# Patient Record
Sex: MALE | Race: WHITE | NOT HISPANIC OR LATINO
[De-identification: names, ages, dates, MRNs, and addresses within clinical notes are randomized per-mention and may not be internally consistent; named-entity substitution may affect disease eponyms.]

---

## 2017-02-28 ENCOUNTER — APPOINTMENT (OUTPATIENT)
Dept: OTOLARYNGOLOGY | Facility: CLINIC | Age: 82
End: 2017-02-28

## 2017-08-02 ENCOUNTER — APPOINTMENT (OUTPATIENT)
Dept: OTOLARYNGOLOGY | Facility: CLINIC | Age: 82
End: 2017-08-02
Payer: MEDICARE

## 2017-08-02 VITALS
HEART RATE: 66 BPM | DIASTOLIC BLOOD PRESSURE: 62 MMHG | SYSTOLIC BLOOD PRESSURE: 130 MMHG | HEIGHT: 62 IN | WEIGHT: 130 LBS | BODY MASS INDEX: 23.92 KG/M2

## 2017-08-02 PROCEDURE — 99214 OFFICE O/P EST MOD 30 MIN: CPT | Mod: 25

## 2017-08-02 PROCEDURE — 69210 REMOVE IMPACTED EAR WAX UNI: CPT

## 2017-08-02 PROCEDURE — 92567 TYMPANOMETRY: CPT

## 2017-08-02 PROCEDURE — 92557 COMPREHENSIVE HEARING TEST: CPT

## 2017-08-02 RX ORDER — METOPROLOL SUCCINATE 25 MG/1
25 TABLET, EXTENDED RELEASE ORAL
Qty: 180 | Refills: 0 | Status: ACTIVE | COMMUNITY
Start: 2016-12-20

## 2017-08-02 RX ORDER — TRIAMCINOLONE ACETONIDE 1 MG/G
0.1 CREAM TOPICAL
Qty: 80 | Refills: 0 | Status: ACTIVE | COMMUNITY
Start: 2017-03-10

## 2017-08-02 RX ORDER — BUDESONIDE AND FORMOTEROL FUMARATE DIHYDRATE 160; 4.5 UG/1; UG/1
160-4.5 AEROSOL RESPIRATORY (INHALATION)
Qty: 102 | Refills: 0 | Status: ACTIVE | COMMUNITY
Start: 2017-06-13

## 2017-08-02 RX ORDER — TRIAMTERENE AND HYDROCHLOROTHIAZIDE 25; 37.5 MG/1; MG/1
37.5-25 TABLET ORAL
Qty: 30 | Refills: 0 | Status: ACTIVE | COMMUNITY
Start: 2017-03-03

## 2017-11-06 ENCOUNTER — APPOINTMENT (OUTPATIENT)
Dept: OTOLARYNGOLOGY | Facility: CLINIC | Age: 82
End: 2017-11-06
Payer: MEDICARE

## 2017-11-06 VITALS
WEIGHT: 130 LBS | HEART RATE: 67 BPM | SYSTOLIC BLOOD PRESSURE: 144 MMHG | BODY MASS INDEX: 23.92 KG/M2 | DIASTOLIC BLOOD PRESSURE: 72 MMHG | HEIGHT: 62 IN

## 2017-11-06 DIAGNOSIS — J44.9 CHRONIC OBSTRUCTIVE PULMONARY DISEASE, UNSPECIFIED: ICD-10-CM

## 2017-11-06 PROCEDURE — 99213 OFFICE O/P EST LOW 20 MIN: CPT | Mod: 25

## 2017-11-06 PROCEDURE — 69210 REMOVE IMPACTED EAR WAX UNI: CPT

## 2017-11-06 PROCEDURE — 92557 COMPREHENSIVE HEARING TEST: CPT

## 2017-11-06 PROCEDURE — 92567 TYMPANOMETRY: CPT

## 2018-03-26 ENCOUNTER — APPOINTMENT (OUTPATIENT)
Dept: OTOLARYNGOLOGY | Facility: CLINIC | Age: 83
End: 2018-03-26

## 2019-04-30 ENCOUNTER — APPOINTMENT (OUTPATIENT)
Dept: OTOLARYNGOLOGY | Facility: CLINIC | Age: 84
End: 2019-04-30

## 2019-05-01 ENCOUNTER — APPOINTMENT (OUTPATIENT)
Dept: OTOLARYNGOLOGY | Facility: CLINIC | Age: 84
End: 2019-05-01
Payer: MEDICARE

## 2019-05-01 VITALS
BODY MASS INDEX: 23.92 KG/M2 | DIASTOLIC BLOOD PRESSURE: 58 MMHG | HEIGHT: 62 IN | WEIGHT: 130 LBS | HEART RATE: 56 BPM | SYSTOLIC BLOOD PRESSURE: 131 MMHG

## 2019-05-01 PROCEDURE — 92567 TYMPANOMETRY: CPT

## 2019-05-01 PROCEDURE — 92557 COMPREHENSIVE HEARING TEST: CPT

## 2019-05-01 PROCEDURE — 69210 REMOVE IMPACTED EAR WAX UNI: CPT

## 2019-05-01 PROCEDURE — 99214 OFFICE O/P EST MOD 30 MIN: CPT | Mod: 25

## 2019-05-01 NOTE — ASSESSMENT
[FreeTextEntry1] : Mr. BARR is a 89 year male with sensorineural hearing loss and cerumen impaction \par - debrox prn \par - referred for hearing aid evaluation, interested in new hearing aids possibly \par - also discussed possible addition of FM transmitter when watching TV as he seems to need it much louder than everyone else is comfortable\par - f/up 6 months for cerumen check

## 2019-05-01 NOTE — PROCEDURE
[FreeTextEntry1] : cerumen removal  [FreeTextEntry3] : After informed verbal consent is obtained, cerumen is removed from the b/l ear canal with a curette & suction. Pt tolerated well, no residual ear canal irritation. \par   [FreeTextEntry2] : cerumen impaction

## 2019-05-01 NOTE — HISTORY OF PRESENT ILLNESS
[de-identified] : Mr. BARR is a 89 year male with snhl, wears hearing aids. \par + left ear clogged for 2-3 days, tried peroxide with no improvement. \par + hearing loss\par denies otalgia, otorrhea, tinnitus, vertigo

## 2019-05-01 NOTE — PHYSICAL EXAM
[de-identified] : luciano NO [Midline] : trachea located in midline position [Normal] : no rashes

## 2019-12-09 ENCOUNTER — APPOINTMENT (OUTPATIENT)
Dept: OTOLARYNGOLOGY | Facility: CLINIC | Age: 84
End: 2019-12-09
Payer: MEDICARE

## 2019-12-09 VITALS
DIASTOLIC BLOOD PRESSURE: 66 MMHG | WEIGHT: 140 LBS | HEIGHT: 62 IN | BODY MASS INDEX: 25.76 KG/M2 | HEART RATE: 60 BPM | SYSTOLIC BLOOD PRESSURE: 135 MMHG

## 2019-12-09 PROCEDURE — 69210 REMOVE IMPACTED EAR WAX UNI: CPT

## 2019-12-09 PROCEDURE — 31231 NASAL ENDOSCOPY DX: CPT

## 2019-12-09 PROCEDURE — 99214 OFFICE O/P EST MOD 30 MIN: CPT | Mod: 25

## 2019-12-09 NOTE — ASSESSMENT
[FreeTextEntry1] : wax-\par After informed verbal consent is obtained, cerumen is removed from the right and left  ear canal with a curette and suction.\par Rx: \par Debrox was prescribed and  is to be placed in both ears on a routine basis to keep them free of wax.\par Routine debridement suggested to keep the ears free of wax.\par \par bilateral sensorineural hearing loss-cleared for hearing aids\par \par DNS and Rhinitis\par Rx\par   Steam humidification and hypertonic saline nasal irrigations \par \par mild-GERD- Antireflux recommendations were given to the patient\par \par \par \par \par

## 2019-12-09 NOTE — PHYSICAL EXAM
[de-identified] : bilateral cerumen [Nasal Endoscopy Performed] : nasal endoscopy was performed, see procedure section for findings [Laryngoscopy Performed] : laryngoscopy was performed, see procedure section for findings [Midline] : trachea located in midline position [Normal] : no rashes

## 2019-12-09 NOTE — HISTORY OF PRESENT ILLNESS
[No] : patient does not have a  history of radiation therapy [Hearing Loss] : hearing loss [de-identified] : 89 yo male\par bilateral chronic severe hearing loss with poor discrimination. \par He has a significant history of cerumen impaction\par Chr sl nasal congestiopn and hoarseness and wish scoping evaluation\par No SOB or stridor\par  [Presbycusis] : presbycusis [None] : No associated symptoms are reported.

## 2020-05-11 ENCOUNTER — APPOINTMENT (OUTPATIENT)
Dept: OTOLARYNGOLOGY | Facility: CLINIC | Age: 85
End: 2020-05-11
Payer: MEDICARE

## 2020-05-11 VITALS
HEART RATE: 60 BPM | WEIGHT: 130 LBS | HEIGHT: 62 IN | SYSTOLIC BLOOD PRESSURE: 148 MMHG | TEMPERATURE: 96.6 F | DIASTOLIC BLOOD PRESSURE: 66 MMHG | BODY MASS INDEX: 23.92 KG/M2

## 2020-05-11 DIAGNOSIS — R49.0 DYSPHONIA: ICD-10-CM

## 2020-05-11 PROCEDURE — 99213 OFFICE O/P EST LOW 20 MIN: CPT | Mod: 25

## 2020-05-11 PROCEDURE — 69210 REMOVE IMPACTED EAR WAX UNI: CPT

## 2020-05-11 NOTE — PHYSICAL EXAM
[Nasal Endoscopy Performed] : nasal endoscopy was performed, see procedure section for findings [Midline] : trachea located in midline position [Laryngoscopy Performed] : laryngoscopy was performed, see procedure section for findings [Normal] : orientation to person, place, and time: normal [de-identified] : bilateral cerumen

## 2020-05-11 NOTE — ASSESSMENT
[FreeTextEntry1] : wax-\par After informed verbal consent is obtained, cerumen is removed from the right and left  ear canal with a curette and suction.\par Rx: \par Debrox was prescribed and  is to be placed in both ears on a routine basis to keep them free of wax.\par Routine debridement suggested to keep the ears free of wax.\par \par bilateral sensorineural hearing loss-cleared for hearing aids\par \par \par f/u 4 months or prn \par \par

## 2020-05-11 NOTE — HISTORY OF PRESENT ILLNESS
[No] : patient does not have a  history of radiation therapy [Hearing Loss] : hearing loss [Presbycusis] : presbycusis [None] : No risk factors have been identified. [de-identified] : 89 yo male\par bilateral chronic severe hearing loss with poor discrimination. \par He has a significant history of cerumen impaction\par Chr sl nasal congestiopn and hoarseness and wish scoping evaluation\par No SOB or stridor\par  [Dizziness] : no dizziness [Anxiety] : no anxiety [Headache] : no headache [Recurrent Otitis Media] : no recurrent otitis media [Otitis Media with Effusion] : no otitis media with effusion [Meniere Disease] : no Meniere disease [Congenital Ear Malformation] : no congenital ear malformation [Perilymphatic Fistula] : no perilymphatic fistula [Otosclerosis] : no otosclerosis [Hypertension] : no hypertension [Loud Noise Exposure] : no history of loud noise exposure [Smoking] : no smoking [Stroke] : no stroke [Early Onset Hearing Loss] : no early onset hearing loss [Facial Pain] : no facial pain [Facial Pressure] : no facial pressure [Nasal Congestion] : no nasal congestion [Ear Fullness] : no ear fullness [Diplopia] : no diplopia [Allergic Rhinitis] : no allergic rhinitis [Maxillary Tooth Infection] : no maxillary tooth infection [Septal Deviation] : no septal deviation [Seasonal Allergies] : no seasonal allergies [Environmental Allergens] : no environmental allergens [Environmental Allergies] : no environmental allergies [Adenoidectomy] : no adenoidectomy [Nasal FB Removal] : no nasal foreign body removal [Allergies] : no allergies [Asthma] : no asthma [Neck Mass] : no neck mass [Neck Pain] : no neck pain [Chills] : no chills [Cough] : no cough [Cold Intolerance] : no cold intolerance [Fatigue] : no fatigue [Heat Intolerance] : no heat intolerance [Sialadenitis] : no sialadenitis [Hyperthyroidism] : no hyperthyroidism [Hodgkin Disease] : no hodgkin disease [Non-Hodgkin Lymphoma] : no non-hodgkin lymphoma [Tobacco Use] : no tobacco use [Alcohol Use] : no alcohol use [Graves Disease] : no graves disease [Thyroid Cancer] : no thyroid cancer

## 2020-09-09 ENCOUNTER — APPOINTMENT (OUTPATIENT)
Dept: OTOLARYNGOLOGY | Facility: CLINIC | Age: 85
End: 2020-09-09

## 2020-10-06 ENCOUNTER — APPOINTMENT (OUTPATIENT)
Dept: OTOLARYNGOLOGY | Facility: CLINIC | Age: 85
End: 2020-10-06
Payer: MEDICARE

## 2020-10-06 VITALS
HEART RATE: 55 BPM | TEMPERATURE: 97.6 F | BODY MASS INDEX: 23.92 KG/M2 | HEIGHT: 62 IN | SYSTOLIC BLOOD PRESSURE: 114 MMHG | DIASTOLIC BLOOD PRESSURE: 49 MMHG | WEIGHT: 130 LBS

## 2020-10-06 PROCEDURE — 69210 REMOVE IMPACTED EAR WAX UNI: CPT

## 2020-10-06 PROCEDURE — 99214 OFFICE O/P EST MOD 30 MIN: CPT | Mod: 25

## 2020-10-06 RX ORDER — VALSARTAN 80 MG/1
80 TABLET, COATED ORAL
Qty: 90 | Refills: 0 | Status: ACTIVE | COMMUNITY
Start: 2020-09-02

## 2020-10-06 RX ORDER — OLOPATADINE HYDROCHLORIDE 2 MG/ML
0.2 SOLUTION OPHTHALMIC
Qty: 2 | Refills: 0 | Status: ACTIVE | COMMUNITY
Start: 2020-06-18

## 2020-10-06 RX ORDER — FUROSEMIDE 20 MG/1
20 TABLET ORAL
Qty: 180 | Refills: 0 | Status: ACTIVE | COMMUNITY
Start: 2020-06-18

## 2020-10-06 NOTE — HISTORY OF PRESENT ILLNESS
[de-identified] : PAtient continues to wear his hearing aids bilaterally but has been having ear clogging bilaterally that is making his hearing seem worse. He does have minor pressure in the ears as they feel full but he believes this is due to wax. He does not have any issues with ringing in the ears or recent dizziness. He is not having any nasal complaints today such as congestion or runny nose . He does admit that a few days ago he put peroxide in the ears to try and clean them at home

## 2020-10-06 NOTE — ASSESSMENT
[FreeTextEntry1] : Cerumen impaction bilaterally curetted out will follow-up and see us in 6 months.

## 2021-03-15 ENCOUNTER — APPOINTMENT (OUTPATIENT)
Dept: OTOLARYNGOLOGY | Facility: CLINIC | Age: 86
End: 2021-03-15
Payer: MEDICARE

## 2021-03-15 VITALS
DIASTOLIC BLOOD PRESSURE: 62 MMHG | TEMPERATURE: 97.6 F | SYSTOLIC BLOOD PRESSURE: 139 MMHG | HEART RATE: 61 BPM | HEIGHT: 61 IN | WEIGHT: 140 LBS | BODY MASS INDEX: 26.43 KG/M2

## 2021-03-15 PROCEDURE — 99214 OFFICE O/P EST MOD 30 MIN: CPT | Mod: 25

## 2021-03-15 PROCEDURE — 69210 REMOVE IMPACTED EAR WAX UNI: CPT

## 2021-03-15 NOTE — HISTORY OF PRESENT ILLNESS
[de-identified] : Patient has hearing aids here for evaluation to have his ears cleaned because of diminished hearing.  Also interested in possible new hearing aids.

## 2021-07-19 ENCOUNTER — APPOINTMENT (OUTPATIENT)
Dept: OTOLARYNGOLOGY | Facility: CLINIC | Age: 86
End: 2021-07-19
Payer: MEDICARE

## 2021-07-19 DIAGNOSIS — J34.2 DEVIATED NASAL SEPTUM: ICD-10-CM

## 2021-07-19 DIAGNOSIS — J31.0 CHRONIC RHINITIS: ICD-10-CM

## 2021-07-19 PROCEDURE — 99212 OFFICE O/P EST SF 10 MIN: CPT | Mod: 25

## 2021-07-19 PROCEDURE — 69210 REMOVE IMPACTED EAR WAX UNI: CPT

## 2021-07-20 NOTE — ASSESSMENT
New Reveal LINQ [id number] SN [id number] implanted  [FreeTextEntry1] : Patient with hx of hearing loss with hearing aids presents for routine ear cleaning. \par \par Wax:\par -Cerumen is removed from the right and left  ear canal with a curette and suction.\par -Rx:Debrox be placed in both ears on a routine basis to keep them free of wax.\par -Routine debridement suggested to keep the ears free of wax.\par \par Bilateral SNHL:\par -cleared for hearing aids\par \par f/u prn

## 2021-07-20 NOTE — HISTORY OF PRESENT ILLNESS
[de-identified] : Patient with hx of hearing loss with hearing aids presents for ear cleaning, states ear feel clogged here for routine ear cleaning. Otherwise doing well. Pt has no ear pain, ear drainage, tinnitus, vertigo, nasal congestion, nasal discharge, epistaxis, sinus infections, facial pain, facial pressure, throat pain, dysphagia or fevers\par \par

## 2021-08-06 ENCOUNTER — APPOINTMENT (OUTPATIENT)
Dept: OTOLARYNGOLOGY | Facility: CLINIC | Age: 86
End: 2021-08-06

## 2021-11-19 ENCOUNTER — APPOINTMENT (OUTPATIENT)
Dept: OTOLARYNGOLOGY | Facility: CLINIC | Age: 86
End: 2021-11-19
Payer: MEDICARE

## 2021-11-19 VITALS
HEIGHT: 61 IN | TEMPERATURE: 97.6 F | BODY MASS INDEX: 26.43 KG/M2 | HEART RATE: 67 BPM | SYSTOLIC BLOOD PRESSURE: 128 MMHG | WEIGHT: 140 LBS | DIASTOLIC BLOOD PRESSURE: 53 MMHG

## 2021-11-19 DIAGNOSIS — K21.9 GASTRO-ESOPHAGEAL REFLUX DISEASE W/OUT ESOPHAGITIS: ICD-10-CM

## 2021-11-19 PROCEDURE — 69210 REMOVE IMPACTED EAR WAX UNI: CPT

## 2021-11-19 PROCEDURE — 99212 OFFICE O/P EST SF 10 MIN: CPT | Mod: 25

## 2021-11-19 NOTE — HISTORY OF PRESENT ILLNESS
[de-identified] : Patient with hx of hearing loss with hearing aids presents for ear cleaning, states ear feel clogged here for routine ear cleaning. Otherwise doing well. Pt has no ear pain, ear drainage, tinnitus, vertigo, nasal congestion, nasal discharge, epistaxis, sinus infections, facial pain, facial pressure, throat pain, dysphagia or fevers\par \par

## 2021-11-19 NOTE — ASSESSMENT
[FreeTextEntry1] : Patient with hx of hearing loss with hearing aids presents for routine ear cleaning. \par \par Wax:\par -Cerumen is removed from the right and left  ear canal with a curette and suction.\par -Rx:Debrox be placed in both ears on a routine basis to keep them free of wax.\par -Routine debridement suggested to keep the ears free of wax.\par \par Bilateral SNHL:\par -cleared for hearing aids\par \par f/u prn

## 2022-03-31 ENCOUNTER — APPOINTMENT (OUTPATIENT)
Dept: OTOLARYNGOLOGY | Facility: CLINIC | Age: 87
End: 2022-03-31
Payer: MEDICARE

## 2022-03-31 VITALS — HEART RATE: 57 BPM | TEMPERATURE: 97.4 F | SYSTOLIC BLOOD PRESSURE: 104 MMHG | DIASTOLIC BLOOD PRESSURE: 55 MMHG

## 2022-03-31 PROCEDURE — 99214 OFFICE O/P EST MOD 30 MIN: CPT | Mod: 25

## 2022-03-31 PROCEDURE — 69210 REMOVE IMPACTED EAR WAX UNI: CPT

## 2022-03-31 NOTE — HISTORY OF PRESENT ILLNESS
[de-identified] : Patient wears hearing aids and feels the need for an ear cleaning. he does not have any pain in the ears or issues with ringing in the ears or dizziness. He is due to have his ears cleaned but wanted to come a little earlier as he has a wedding this weekend he will be attending

## 2022-03-31 NOTE — END OF VISIT
[FreeTextEntry3] : I saw and examined this patient in person. I have discussed with Tawanna Medel, Physician Assistant, in detail the above note and agree with the above assessment and plan of care.\par

## 2022-10-12 ENCOUNTER — APPOINTMENT (OUTPATIENT)
Dept: OTOLARYNGOLOGY | Facility: CLINIC | Age: 87
End: 2022-10-12

## 2022-10-12 VITALS
SYSTOLIC BLOOD PRESSURE: 150 MMHG | HEART RATE: 55 BPM | HEIGHT: 61 IN | DIASTOLIC BLOOD PRESSURE: 61 MMHG | BODY MASS INDEX: 27.19 KG/M2 | WEIGHT: 144 LBS

## 2022-10-12 PROCEDURE — 99214 OFFICE O/P EST MOD 30 MIN: CPT | Mod: 25

## 2022-10-12 PROCEDURE — 69210 REMOVE IMPACTED EAR WAX UNI: CPT

## 2022-10-12 PROCEDURE — 92557 COMPREHENSIVE HEARING TEST: CPT

## 2022-10-12 PROCEDURE — 92567 TYMPANOMETRY: CPT

## 2022-10-12 NOTE — ASSESSMENT
[FreeTextEntry1] : Patient 89-year-old gentleman who comes in for cerumen management curetted out felt that his hearing deteriorated on examination tympanic membrane's intact we did an audiogram pretty stable.  We will follow-up with us as needed.

## 2022-10-12 NOTE — HISTORY OF PRESENT ILLNESS
[de-identified] : PAtient is here for an ear cleaning. He does not have any pain in the ears but yesterday the hearing suddenly worsened. He does not have any drainage from the ears or ringing in the ears. He does wear hearing aids but not using them daily\par

## 2022-10-12 NOTE — END OF VISIT
[FreeTextEntry3] : I, Dr. Pugh personally performed the evaluation and management (E/M) services , including all procedures, for this established patient who presents today with (a) new problem(s)/exacerbation of (an) existing condition(s). That E/M includes conducting the clinically appropriate interval history &/or exam, assessing all new/exacerbated conditions, and establishing a new plan of care. Today, my GISELLE, Tawanna Medel, was here to observe &/or participate in the visit & follow plan of care established by me.\par \par \par \par

## 2023-05-04 ENCOUNTER — APPOINTMENT (OUTPATIENT)
Dept: OTOLARYNGOLOGY | Facility: CLINIC | Age: 88
End: 2023-05-04
Payer: MEDICARE

## 2023-05-04 VITALS
DIASTOLIC BLOOD PRESSURE: 61 MMHG | SYSTOLIC BLOOD PRESSURE: 136 MMHG | TEMPERATURE: 98 F | BODY MASS INDEX: 27 KG/M2 | HEIGHT: 61 IN | HEART RATE: 57 BPM | WEIGHT: 143 LBS

## 2023-05-04 PROCEDURE — 69210 REMOVE IMPACTED EAR WAX UNI: CPT

## 2023-05-04 PROCEDURE — 99214 OFFICE O/P EST MOD 30 MIN: CPT | Mod: 25

## 2023-05-04 NOTE — HISTORY OF PRESENT ILLNESS
[de-identified] : Patient does wear hearing aids but not wearing right now, here for an ear cleaning. He does not have any complaints about pain in the ears or pressure. He does not have any recent issues with nasal congestion or runnynose

## 2023-07-01 ENCOUNTER — OUTPATIENT (OUTPATIENT)
Dept: OUTPATIENT SERVICES | Facility: HOSPITAL | Age: 88
LOS: 1 days | End: 2023-07-01
Payer: MEDICARE

## 2023-07-01 ENCOUNTER — APPOINTMENT (OUTPATIENT)
Dept: RADIOLOGY | Facility: CLINIC | Age: 88
End: 2023-07-01
Payer: MEDICARE

## 2023-07-01 DIAGNOSIS — Z98.0 INTESTINAL BYPASS AND ANASTOMOSIS STATUS: Chronic | ICD-10-CM

## 2023-07-01 DIAGNOSIS — D46.1 REFRACTORY ANEMIA WITH RING SIDEROBLASTS: ICD-10-CM

## 2023-07-01 PROCEDURE — 71046 X-RAY EXAM CHEST 2 VIEWS: CPT

## 2023-07-01 PROCEDURE — 71046 X-RAY EXAM CHEST 2 VIEWS: CPT | Mod: 26

## 2023-09-07 NOTE — ASSESSMENT
[FreeTextEntry1] : Patient follows up once again diminished hearing once again cerumen impaction once again curetted out revealing normal tympanic membranes otherwise patient is doing really well will follow up and see us in 6 months.
No.

## 2024-01-23 ENCOUNTER — APPOINTMENT (OUTPATIENT)
Dept: OTOLARYNGOLOGY | Facility: CLINIC | Age: 89
End: 2024-01-23
Payer: MEDICARE

## 2024-01-23 VITALS
HEART RATE: 62 BPM | TEMPERATURE: 98 F | BODY MASS INDEX: 27 KG/M2 | DIASTOLIC BLOOD PRESSURE: 58 MMHG | WEIGHT: 143 LBS | HEIGHT: 61 IN | SYSTOLIC BLOOD PRESSURE: 165 MMHG

## 2024-01-23 DIAGNOSIS — R49.0 DYSPHONIA: ICD-10-CM

## 2024-01-23 DIAGNOSIS — H61.23 IMPACTED CERUMEN, BILATERAL: ICD-10-CM

## 2024-01-23 DIAGNOSIS — R42 DIZZINESS AND GIDDINESS: ICD-10-CM

## 2024-01-23 PROCEDURE — 99213 OFFICE O/P EST LOW 20 MIN: CPT | Mod: 25

## 2024-01-23 PROCEDURE — 69210 REMOVE IMPACTED EAR WAX UNI: CPT

## 2024-01-23 RX ORDER — MOMETASONE FUROATE 1 MG/ML
0.1 SOLUTION TOPICAL
Qty: 1 | Refills: 1 | Status: ACTIVE | COMMUNITY
Start: 2024-01-23 | End: 1900-01-01

## 2024-01-23 NOTE — PHYSICAL EXAM
[Midline] : trachea located in midline position [Normal] : no rashes [de-identified] : cerumen in the ear canals; once removed normal EACs

## 2024-01-23 NOTE — END OF VISIT
[FreeTextEntry3] : I, Dr. Pugh personally performed the evaluation and management (E/M) services , including all procedures, for this established patient who presents today with (a) new problem(s)/exacerbation of (an) existing condition(s). That E/M includes conducting the clinically appropriate interval history &/or exam, assessing all new/exacerbated conditions, and establishing a new plan of care. Today, my GISELLE, Tawanna Medel, was here to observe &/or participate in the visit & follow plan of care established by me.

## 2024-01-23 NOTE — ASSESSMENT
[FreeTextEntry1] : Patient here with his daughter cerumen impaction complaining of some dry postnasal drip told him to use some saline otherwise doing really well follow-up and see us in 3 months.

## 2024-01-23 NOTE — HISTORY OF PRESENT ILLNESS
[de-identified] : Patient comes in for an ear check and possible cleaning. he does not have any pain in the ears or ringing in the ears . Historically he does build up cerumen and needs an ear cleaning. He does have dizziness with certain head movements He does complain that he goes to bed at night with bilateral ear clogging. He is able to remove some wax with his fingers when this happens  Additionally he feels his voice is hoarse on occasion as well as feels a lot of mucus in the throat

## 2024-01-23 NOTE — REVIEW OF SYSTEMS
[Negative] : Heme/Lymph [Dizziness] : dizziness [As Noted in HPI] : as noted in HPI [Hoarseness] : hoarseness

## 2024-04-22 ENCOUNTER — APPOINTMENT (OUTPATIENT)
Dept: OTOLARYNGOLOGY | Facility: CLINIC | Age: 89
End: 2024-04-22

## 2024-04-23 ENCOUNTER — APPOINTMENT (OUTPATIENT)
Dept: OTOLARYNGOLOGY | Facility: CLINIC | Age: 89
End: 2024-04-23
Payer: MEDICARE

## 2024-04-23 VITALS — BODY MASS INDEX: 27 KG/M2 | WEIGHT: 143 LBS | HEIGHT: 61 IN

## 2024-04-23 DIAGNOSIS — H90.3 SENSORINEURAL HEARING LOSS, BILATERAL: ICD-10-CM

## 2024-04-23 PROCEDURE — 99213 OFFICE O/P EST LOW 20 MIN: CPT | Mod: 25

## 2024-04-23 PROCEDURE — 69210 REMOVE IMPACTED EAR WAX UNI: CPT

## 2024-04-23 NOTE — ASSESSMENT
[FreeTextEntry1] : `` Patient cerumen management wax removed bilaterally otherwise he is doing really well follow-up and see us in 3 months.

## 2024-04-23 NOTE — PHYSICAL EXAM
[Midline] : trachea located in midline position [Normal] : no rashes [de-identified] : cerumen in the ear canals; once removed normal EACs

## 2024-04-23 NOTE — HISTORY OF PRESENT ILLNESS
[de-identified] : Patient comes in with ear clogging and needs another ear cleaning. He does not have any complaints today like pain in the ears or issues with pressure. He does have hearing aids but doesnt wear them He does not have any nasal complaints today like congestion or runny nose  He can tell when he is due for an ear cleaning because he gets off balance when he is clogged

## 2024-06-12 ENCOUNTER — INPATIENT (INPATIENT)
Facility: HOSPITAL | Age: 89
LOS: 4 days | Discharge: HOME HEALTH | DRG: 206 | End: 2024-06-17
Attending: INTERNAL MEDICINE | Admitting: INTERNAL MEDICINE
Payer: MEDICARE

## 2024-06-12 VITALS
SYSTOLIC BLOOD PRESSURE: 162 MMHG | WEIGHT: 130.07 LBS | RESPIRATION RATE: 20 BRPM | TEMPERATURE: 98 F | OXYGEN SATURATION: 99 % | HEART RATE: 60 BPM | HEIGHT: 62 IN | DIASTOLIC BLOOD PRESSURE: 56 MMHG

## 2024-06-12 DIAGNOSIS — S22.49XA MULTIPLE FRACTURES OF RIBS, UNSPECIFIED SIDE, INITIAL ENCOUNTER FOR CLOSED FRACTURE: ICD-10-CM

## 2024-06-12 DIAGNOSIS — Z98.0 INTESTINAL BYPASS AND ANASTOMOSIS STATUS: Chronic | ICD-10-CM

## 2024-06-12 LAB
ALBUMIN SERPL ELPH-MCNC: 3.8 G/DL — SIGNIFICANT CHANGE UP (ref 3.3–5)
ALP SERPL-CCNC: 103 U/L — SIGNIFICANT CHANGE UP (ref 40–120)
ALT FLD-CCNC: 12 U/L — SIGNIFICANT CHANGE UP (ref 10–45)
ANION GAP SERPL CALC-SCNC: 11 MMOL/L — SIGNIFICANT CHANGE UP (ref 5–17)
ANISOCYTOSIS BLD QL: SLIGHT — SIGNIFICANT CHANGE UP
APPEARANCE UR: CLEAR — SIGNIFICANT CHANGE UP
AST SERPL-CCNC: 30 U/L — SIGNIFICANT CHANGE UP (ref 10–40)
BASE EXCESS BLDV CALC-SCNC: -1.2 MMOL/L — SIGNIFICANT CHANGE UP (ref -2–3)
BASOPHILS # BLD AUTO: 0.1 K/UL — SIGNIFICANT CHANGE UP (ref 0–0.2)
BASOPHILS NFR BLD AUTO: 1.7 % — SIGNIFICANT CHANGE UP (ref 0–2)
BILIRUB SERPL-MCNC: 1.4 MG/DL — HIGH (ref 0.2–1.2)
BILIRUB UR-MCNC: NEGATIVE — SIGNIFICANT CHANGE UP
BLD GP AB SCN SERPL QL: NEGATIVE — SIGNIFICANT CHANGE UP
BUN SERPL-MCNC: 22 MG/DL — SIGNIFICANT CHANGE UP (ref 7–23)
CA-I SERPL-SCNC: 1.19 MMOL/L — SIGNIFICANT CHANGE UP (ref 1.15–1.33)
CALCIUM SERPL-MCNC: 9 MG/DL — SIGNIFICANT CHANGE UP (ref 8.4–10.5)
CHLORIDE BLDV-SCNC: 106 MMOL/L — SIGNIFICANT CHANGE UP (ref 96–108)
CHLORIDE SERPL-SCNC: 108 MMOL/L — SIGNIFICANT CHANGE UP (ref 96–108)
CO2 BLDV-SCNC: 27 MMOL/L — HIGH (ref 22–26)
CO2 SERPL-SCNC: 21 MMOL/L — LOW (ref 22–31)
COLOR SPEC: YELLOW — SIGNIFICANT CHANGE UP
CREAT SERPL-MCNC: 0.87 MG/DL — SIGNIFICANT CHANGE UP (ref 0.5–1.3)
DIFF PNL FLD: NEGATIVE — SIGNIFICANT CHANGE UP
EGFR: 78 ML/MIN/1.73M2 — SIGNIFICANT CHANGE UP
EOSINOPHIL # BLD AUTO: 0.27 K/UL — SIGNIFICANT CHANGE UP (ref 0–0.5)
EOSINOPHIL NFR BLD AUTO: 4.4 % — SIGNIFICANT CHANGE UP (ref 0–6)
ETHANOL SERPL-MCNC: <10 MG/DL — SIGNIFICANT CHANGE UP (ref 0–10)
GAS PNL BLDV: 138 MMOL/L — SIGNIFICANT CHANGE UP (ref 136–145)
GAS PNL BLDV: SIGNIFICANT CHANGE UP
GLUCOSE BLDV-MCNC: 83 MG/DL — SIGNIFICANT CHANGE UP (ref 70–99)
GLUCOSE SERPL-MCNC: 93 MG/DL — SIGNIFICANT CHANGE UP (ref 70–99)
GLUCOSE UR QL: NEGATIVE MG/DL — SIGNIFICANT CHANGE UP
HCO3 BLDV-SCNC: 26 MMOL/L — SIGNIFICANT CHANGE UP (ref 22–29)
HCT VFR BLD CALC: 35 % — LOW (ref 39–50)
HCT VFR BLDA CALC: 29 % — LOW (ref 39–51)
HGB BLD CALC-MCNC: 9.7 G/DL — LOW (ref 12.6–17.4)
HGB BLD-MCNC: 11.1 G/DL — LOW (ref 13–17)
KETONES UR-MCNC: NEGATIVE MG/DL — SIGNIFICANT CHANGE UP
LACTATE BLDV-MCNC: 1.7 MMOL/L — SIGNIFICANT CHANGE UP (ref 0.5–2)
LACTATE SERPL-SCNC: 1.9 MMOL/L — SIGNIFICANT CHANGE UP (ref 0.5–2)
LEUKOCYTE ESTERASE UR-ACNC: NEGATIVE — SIGNIFICANT CHANGE UP
LIDOCAIN IGE QN: 21 U/L — SIGNIFICANT CHANGE UP (ref 7–60)
LYMPHOCYTES # BLD AUTO: 2.12 K/UL — SIGNIFICANT CHANGE UP (ref 1–3.3)
LYMPHOCYTES # BLD AUTO: 35.1 % — SIGNIFICANT CHANGE UP (ref 13–44)
MACROCYTES BLD QL: SIGNIFICANT CHANGE UP
MANUAL SMEAR VERIFICATION: SIGNIFICANT CHANGE UP
MCHC RBC-ENTMCNC: 31.7 GM/DL — LOW (ref 32–36)
MCHC RBC-ENTMCNC: 35.4 PG — HIGH (ref 27–34)
MCV RBC AUTO: 111.5 FL — HIGH (ref 80–100)
MONOCYTES # BLD AUTO: 1.32 K/UL — HIGH (ref 0–0.9)
MONOCYTES NFR BLD AUTO: 21.9 % — HIGH (ref 2–14)
MYELOCYTES NFR BLD: 0.9 % — HIGH (ref 0–0)
NEUTROPHILS # BLD AUTO: 2.17 K/UL — SIGNIFICANT CHANGE UP (ref 1.8–7.4)
NEUTROPHILS NFR BLD AUTO: 34.2 % — LOW (ref 43–77)
NEUTS BAND # BLD: 1.8 % — SIGNIFICANT CHANGE UP (ref 0–8)
NITRITE UR-MCNC: NEGATIVE — SIGNIFICANT CHANGE UP
OVALOCYTES BLD QL SMEAR: SLIGHT — SIGNIFICANT CHANGE UP
PCO2 BLDV: 52 MMHG — SIGNIFICANT CHANGE UP (ref 42–55)
PH BLDV: 7.3 — LOW (ref 7.32–7.43)
PH UR: 5 — SIGNIFICANT CHANGE UP (ref 5–8)
PLAT MORPH BLD: ABNORMAL
PLATELET # BLD AUTO: 140 K/UL — LOW (ref 150–400)
PO2 BLDV: 34 MMHG — SIGNIFICANT CHANGE UP (ref 25–45)
POIKILOCYTOSIS BLD QL AUTO: SLIGHT — SIGNIFICANT CHANGE UP
POTASSIUM BLDV-SCNC: 4 MMOL/L — SIGNIFICANT CHANGE UP (ref 3.5–5.1)
POTASSIUM SERPL-MCNC: 4.1 MMOL/L — SIGNIFICANT CHANGE UP (ref 3.5–5.3)
POTASSIUM SERPL-SCNC: 4.1 MMOL/L — SIGNIFICANT CHANGE UP (ref 3.5–5.3)
PROT SERPL-MCNC: 6.6 G/DL — SIGNIFICANT CHANGE UP (ref 6–8.3)
PROT UR-MCNC: NEGATIVE MG/DL — SIGNIFICANT CHANGE UP
RBC # BLD: 3.14 M/UL — LOW (ref 4.2–5.8)
RBC # FLD: 20.7 % — HIGH (ref 10.3–14.5)
RBC BLD AUTO: ABNORMAL
RH IG SCN BLD-IMP: POSITIVE — SIGNIFICANT CHANGE UP
SAO2 % BLDV: 49.3 % — LOW (ref 67–88)
SCHISTOCYTES BLD QL AUTO: SLIGHT — SIGNIFICANT CHANGE UP
SODIUM SERPL-SCNC: 140 MMOL/L — SIGNIFICANT CHANGE UP (ref 135–145)
SP GR SPEC: >1.03 — HIGH (ref 1–1.03)
TROPONIN T, HIGH SENSITIVITY RESULT: 108 NG/L — HIGH (ref 0–51)
TROPONIN T, HIGH SENSITIVITY RESULT: 111 NG/L — HIGH (ref 0–51)
UROBILINOGEN FLD QL: 0.2 MG/DL — SIGNIFICANT CHANGE UP (ref 0.2–1)
WBC # BLD: 6.03 K/UL — SIGNIFICANT CHANGE UP (ref 3.8–10.5)
WBC # FLD AUTO: 6.03 K/UL — SIGNIFICANT CHANGE UP (ref 3.8–10.5)

## 2024-06-12 PROCEDURE — 71260 CT THORAX DX C+: CPT | Mod: 26,MC

## 2024-06-12 PROCEDURE — 70450 CT HEAD/BRAIN W/O DYE: CPT | Mod: 26,MC

## 2024-06-12 PROCEDURE — 99285 EMERGENCY DEPT VISIT HI MDM: CPT | Mod: FS

## 2024-06-12 PROCEDURE — 71045 X-RAY EXAM CHEST 1 VIEW: CPT | Mod: 26

## 2024-06-12 PROCEDURE — 99223 1ST HOSP IP/OBS HIGH 75: CPT

## 2024-06-12 PROCEDURE — 72125 CT NECK SPINE W/O DYE: CPT | Mod: 26,MC

## 2024-06-12 PROCEDURE — 72170 X-RAY EXAM OF PELVIS: CPT | Mod: 26

## 2024-06-12 PROCEDURE — 74177 CT ABD & PELVIS W/CONTRAST: CPT | Mod: 26,MC

## 2024-06-12 RX ORDER — CLOPIDOGREL BISULFATE 75 MG/1
75 TABLET, FILM COATED ORAL DAILY
Refills: 0 | Status: DISCONTINUED | OUTPATIENT
Start: 2024-06-13 | End: 2024-06-17

## 2024-06-12 RX ORDER — CYANOCOBALAMIN (VITAMIN B-12) 1000 MCG
1000 TABLET, EXTENDED RELEASE ORAL DAILY
Refills: 0 | Status: DISCONTINUED | OUTPATIENT
Start: 2024-06-12 | End: 2024-06-14

## 2024-06-12 RX ORDER — HEPARIN SODIUM 50 [USP'U]/ML
5000 INJECTION, SOLUTION INTRAVENOUS EVERY 12 HOURS
Refills: 0 | Status: DISCONTINUED | OUTPATIENT
Start: 2024-06-12 | End: 2024-06-17

## 2024-06-12 RX ORDER — TAMSULOSIN HYDROCHLORIDE 0.4 MG/1
0.4 CAPSULE ORAL AT BEDTIME
Refills: 0 | Status: DISCONTINUED | OUTPATIENT
Start: 2024-06-12 | End: 2024-06-17

## 2024-06-12 RX ORDER — METOPROLOL TARTRATE 50 MG
1 TABLET ORAL
Refills: 0 | DISCHARGE

## 2024-06-12 RX ORDER — ENOXAPARIN SODIUM 100 MG/ML
40 INJECTION SUBCUTANEOUS EVERY 24 HOURS
Refills: 0 | Status: DISCONTINUED | OUTPATIENT
Start: 2024-06-12 | End: 2024-06-12

## 2024-06-12 RX ORDER — ACETAMINOPHEN 325 MG
650 TABLET ORAL EVERY 6 HOURS
Refills: 0 | Status: DISCONTINUED | OUTPATIENT
Start: 2024-06-12 | End: 2024-06-17

## 2024-06-12 RX ORDER — METOPROLOL TARTRATE 50 MG
25 TABLET ORAL DAILY
Refills: 0 | Status: DISCONTINUED | OUTPATIENT
Start: 2024-06-13 | End: 2024-06-17

## 2024-06-12 RX ORDER — METOPROLOL TARTRATE 50 MG
25 TABLET ORAL ONCE
Refills: 0 | Status: COMPLETED | OUTPATIENT
Start: 2024-06-12 | End: 2024-06-12

## 2024-06-12 RX ORDER — ACETAMINOPHEN 325 MG
1000 TABLET ORAL ONCE
Refills: 0 | Status: COMPLETED | OUTPATIENT
Start: 2024-06-12 | End: 2024-06-12

## 2024-06-12 RX ADMIN — Medication 25 MILLIGRAM(S): at 21:01

## 2024-06-12 RX ADMIN — Medication 400 MILLIGRAM(S): at 18:00

## 2024-06-12 RX ADMIN — Medication 650 MILLIGRAM(S): at 18:34

## 2024-06-12 RX ADMIN — TAMSULOSIN HYDROCHLORIDE 0.4 MILLIGRAM(S): 0.4 CAPSULE ORAL at 21:02

## 2024-06-13 ENCOUNTER — RESULT REVIEW (OUTPATIENT)
Age: 89
End: 2024-06-13

## 2024-06-13 DIAGNOSIS — Z29.9 ENCOUNTER FOR PROPHYLACTIC MEASURES, UNSPECIFIED: ICD-10-CM

## 2024-06-13 DIAGNOSIS — R79.89 OTHER SPECIFIED ABNORMAL FINDINGS OF BLOOD CHEMISTRY: ICD-10-CM

## 2024-06-13 DIAGNOSIS — Z71.89 OTHER SPECIFIED COUNSELING: ICD-10-CM

## 2024-06-13 DIAGNOSIS — Z75.8 OTHER PROBLEMS RELATED TO MEDICAL FACILITIES AND OTHER HEALTH CARE: ICD-10-CM

## 2024-06-13 DIAGNOSIS — Z98.890 OTHER SPECIFIED POSTPROCEDURAL STATES: Chronic | ICD-10-CM

## 2024-06-13 DIAGNOSIS — I10 ESSENTIAL (PRIMARY) HYPERTENSION: ICD-10-CM

## 2024-06-13 DIAGNOSIS — Z87.438 PERSONAL HISTORY OF OTHER DISEASES OF MALE GENITAL ORGANS: ICD-10-CM

## 2024-06-13 DIAGNOSIS — D46.9 MYELODYSPLASTIC SYNDROME, UNSPECIFIED: ICD-10-CM

## 2024-06-13 DIAGNOSIS — R41.89 OTHER SYMPTOMS AND SIGNS INVOLVING COGNITIVE FUNCTIONS AND AWARENESS: ICD-10-CM

## 2024-06-13 DIAGNOSIS — R93.7 ABNORMAL FINDINGS ON DIAGNOSTIC IMAGING OF OTHER PARTS OF MUSCULOSKELETAL SYSTEM: ICD-10-CM
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
## 2024-06-13 LAB
ANION GAP SERPL CALC-SCNC: 12 MMOL/L — SIGNIFICANT CHANGE UP (ref 5–17)
BASOPHILS # BLD AUTO: 0.05 K/UL — SIGNIFICANT CHANGE UP (ref 0–0.2)
BASOPHILS NFR BLD AUTO: 0.8 % — SIGNIFICANT CHANGE UP (ref 0–2)
BUN SERPL-MCNC: 21 MG/DL — SIGNIFICANT CHANGE UP (ref 7–23)
CALCIUM SERPL-MCNC: 8.4 MG/DL — SIGNIFICANT CHANGE UP (ref 8.4–10.5)
CHLORIDE SERPL-SCNC: 106 MMOL/L — SIGNIFICANT CHANGE UP (ref 96–108)
CK MB CFR SERPL CALC: 6 NG/ML — SIGNIFICANT CHANGE UP (ref 0–6.7)
CK SERPL-CCNC: 63 U/L — SIGNIFICANT CHANGE UP (ref 30–200)
CO2 SERPL-SCNC: 22 MMOL/L — SIGNIFICANT CHANGE UP (ref 22–31)
CREAT SERPL-MCNC: 0.83 MG/DL — SIGNIFICANT CHANGE UP (ref 0.5–1.3)
EGFR: 79 ML/MIN/1.73M2 — SIGNIFICANT CHANGE UP
EOSINOPHIL # BLD AUTO: 0.09 K/UL — SIGNIFICANT CHANGE UP (ref 0–0.5)
EOSINOPHIL NFR BLD AUTO: 1.4 % — SIGNIFICANT CHANGE UP (ref 0–6)
GLUCOSE SERPL-MCNC: 80 MG/DL — SIGNIFICANT CHANGE UP (ref 70–99)
HCT VFR BLD CALC: 31.6 % — LOW (ref 39–50)
HGB BLD-MCNC: 10.2 G/DL — LOW (ref 13–17)
IMM GRANULOCYTES NFR BLD AUTO: 1 % — HIGH (ref 0–0.9)
LYMPHOCYTES # BLD AUTO: 2.07 K/UL — SIGNIFICANT CHANGE UP (ref 1–3.3)
LYMPHOCYTES # BLD AUTO: 32.8 % — SIGNIFICANT CHANGE UP (ref 13–44)
MCHC RBC-ENTMCNC: 32.3 GM/DL — SIGNIFICANT CHANGE UP (ref 32–36)
MCHC RBC-ENTMCNC: 35.4 PG — HIGH (ref 27–34)
MCV RBC AUTO: 109.7 FL — HIGH (ref 80–100)
MONOCYTES # BLD AUTO: 0.94 K/UL — HIGH (ref 0–0.9)
MONOCYTES NFR BLD AUTO: 14.9 % — HIGH (ref 2–14)
NEUTROPHILS # BLD AUTO: 3.1 K/UL — SIGNIFICANT CHANGE UP (ref 1.8–7.4)
NEUTROPHILS NFR BLD AUTO: 49.1 % — SIGNIFICANT CHANGE UP (ref 43–77)
NRBC # BLD: 0 /100 WBCS — SIGNIFICANT CHANGE UP (ref 0–0)
PLATELET # BLD AUTO: 133 K/UL — LOW (ref 150–400)
POTASSIUM SERPL-MCNC: 4 MMOL/L — SIGNIFICANT CHANGE UP (ref 3.5–5.3)
POTASSIUM SERPL-SCNC: 4 MMOL/L — SIGNIFICANT CHANGE UP (ref 3.5–5.3)
PROCALCITONIN SERPL-MCNC: 0.06 NG/ML — SIGNIFICANT CHANGE UP (ref 0.02–0.1)
RBC # BLD: 2.88 M/UL — LOW (ref 4.2–5.8)
RBC # FLD: 20.3 % — HIGH (ref 10.3–14.5)
SODIUM SERPL-SCNC: 140 MMOL/L — SIGNIFICANT CHANGE UP (ref 135–145)
TROPONIN T, HIGH SENSITIVITY RESULT: 112 NG/L — HIGH (ref 0–51)
WBC # BLD: 6.31 K/UL — SIGNIFICANT CHANGE UP (ref 3.8–10.5)
WBC # FLD AUTO: 6.31 K/UL — SIGNIFICANT CHANGE UP (ref 3.8–10.5)

## 2024-06-13 PROCEDURE — 93306 TTE W/DOPPLER COMPLETE: CPT | Mod: 26

## 2024-06-13 PROCEDURE — 99223 1ST HOSP IP/OBS HIGH 75: CPT

## 2024-06-13 PROCEDURE — 71045 X-RAY EXAM CHEST 1 VIEW: CPT | Mod: 26

## 2024-06-13 RX ORDER — THIAMINE HCL 100 MG
500 TABLET ORAL ONCE
Refills: 0 | Status: COMPLETED | OUTPATIENT
Start: 2024-06-13 | End: 2024-06-13

## 2024-06-13 RX ORDER — VALPROIC ACID 250 MG/5ML
125 SOLUTION ORAL ONCE
Refills: 0 | Status: COMPLETED | OUTPATIENT
Start: 2024-06-13 | End: 2024-06-13

## 2024-06-13 RX ADMIN — VALPROIC ACID 125 MILLIGRAM(S): 250 SOLUTION ORAL at 20:00

## 2024-06-13 RX ADMIN — Medication 5 MILLIGRAM(S): at 22:57

## 2024-06-13 RX ADMIN — Medication 650 MILLIGRAM(S): at 05:34

## 2024-06-13 RX ADMIN — CLOPIDOGREL BISULFATE 75 MILLIGRAM(S): 75 TABLET, FILM COATED ORAL at 12:25

## 2024-06-13 RX ADMIN — Medication 105 MILLIGRAM(S): at 20:00

## 2024-06-13 RX ADMIN — TAMSULOSIN HYDROCHLORIDE 0.4 MILLIGRAM(S): 0.4 CAPSULE ORAL at 21:52

## 2024-06-13 RX ADMIN — Medication 1000 MICROGRAM(S): at 12:25

## 2024-06-13 RX ADMIN — HEPARIN SODIUM 5000 UNIT(S): 50 INJECTION, SOLUTION INTRAVENOUS at 05:34

## 2024-06-13 RX ADMIN — Medication 1000 UNIT(S): at 12:24

## 2024-06-13 RX ADMIN — HEPARIN SODIUM 5000 UNIT(S): 50 INJECTION, SOLUTION INTRAVENOUS at 18:34

## 2024-06-13 RX ADMIN — Medication 25 MILLIGRAM(S): at 21:52

## 2024-06-14 LAB
A1C WITH ESTIMATED AVERAGE GLUCOSE RESULT: 5 % — SIGNIFICANT CHANGE UP (ref 4–5.6)
CCP IGG SERPL-ACNC: <8 UNITS — SIGNIFICANT CHANGE UP
ESTIMATED AVERAGE GLUCOSE: 97 MG/DL — SIGNIFICANT CHANGE UP (ref 68–114)
FOLATE SERPL-MCNC: 12.9 NG/ML — SIGNIFICANT CHANGE UP
HCT VFR BLD CALC: 30.9 % — LOW (ref 39–50)
HGB BLD-MCNC: 10.1 G/DL — LOW (ref 13–17)
MCHC RBC-ENTMCNC: 32.7 GM/DL — SIGNIFICANT CHANGE UP (ref 32–36)
MCHC RBC-ENTMCNC: 35.4 PG — HIGH (ref 27–34)
MCV RBC AUTO: 108.4 FL — HIGH (ref 80–100)
NRBC # BLD: 0 /100 WBCS — SIGNIFICANT CHANGE UP (ref 0–0)
PLATELET # BLD AUTO: 118 K/UL — LOW (ref 150–400)
RBC # BLD: 2.85 M/UL — LOW (ref 4.2–5.8)
RBC # FLD: 19.9 % — HIGH (ref 10.3–14.5)
RF+CCP IGG SER-IMP: NEGATIVE — SIGNIFICANT CHANGE UP
T3 SERPL-MCNC: 84 NG/DL — SIGNIFICANT CHANGE UP (ref 80–200)
T4 AB SER-ACNC: 5.9 UG/DL — SIGNIFICANT CHANGE UP (ref 4.6–12)
TSH SERPL-MCNC: 2.53 UIU/ML — SIGNIFICANT CHANGE UP (ref 0.27–4.2)
VIT B12 SERPL-MCNC: 859 PG/ML — SIGNIFICANT CHANGE UP (ref 232–1245)
WBC # BLD: 5.23 K/UL — SIGNIFICANT CHANGE UP (ref 3.8–10.5)
WBC # FLD AUTO: 5.23 K/UL — SIGNIFICANT CHANGE UP (ref 3.8–10.5)

## 2024-06-14 RX ORDER — VALSARTAN 320 MG/1
1 TABLET ORAL
Refills: 0 | DISCHARGE

## 2024-06-14 RX ORDER — CLOPIDOGREL BISULFATE 75 MG/1
1 TABLET, FILM COATED ORAL
Refills: 0 | DISCHARGE

## 2024-06-14 RX ORDER — CYANOCOBALAMIN (VITAMIN B-12) 1000 MCG
2000 TABLET, EXTENDED RELEASE ORAL DAILY
Refills: 0 | Status: DISCONTINUED | OUTPATIENT
Start: 2024-06-14 | End: 2024-06-17

## 2024-06-14 RX ORDER — DEXLANSOPRAZOLE 60 MG
1 CAPSULE, DELAYED RELEASE, BIPHASIC ORAL
Refills: 0 | DISCHARGE

## 2024-06-14 RX ORDER — FOLIC ACID
1 POWDER (GRAM) MISCELLANEOUS DAILY
Refills: 0 | Status: DISCONTINUED | OUTPATIENT
Start: 2024-06-14 | End: 2024-06-17

## 2024-06-14 RX ORDER — FUROSEMIDE 10 MG/ML
2 INJECTION, SOLUTION INTRAMUSCULAR; INTRAVENOUS
Refills: 0 | DISCHARGE

## 2024-06-14 RX ORDER — SERTRALINE HYDROCHLORIDE 100 MG/1
1 TABLET, FILM COATED ORAL
Refills: 0 | DISCHARGE

## 2024-06-14 RX ORDER — LIDOCAINE HCL 28 MG/G
1 GEL TOPICAL DAILY
Refills: 0 | Status: DISCONTINUED | OUTPATIENT
Start: 2024-06-14 | End: 2024-06-17

## 2024-06-14 RX ORDER — VALSARTAN AND HYDROCHLOROTHIAZIDE 320; 12.5 MG/1; MG/1
1 TABLET, FILM COATED ORAL
Refills: 0 | DISCHARGE

## 2024-06-14 RX ADMIN — CLOPIDOGREL BISULFATE 75 MILLIGRAM(S): 75 TABLET, FILM COATED ORAL at 11:41

## 2024-06-14 RX ADMIN — Medication 5 MILLIGRAM(S): at 21:50

## 2024-06-14 RX ADMIN — Medication 12.5 MILLIGRAM(S): at 21:50

## 2024-06-14 RX ADMIN — HEPARIN SODIUM 5000 UNIT(S): 50 INJECTION, SOLUTION INTRAVENOUS at 17:07

## 2024-06-14 RX ADMIN — Medication 1000 UNIT(S): at 11:42

## 2024-06-14 RX ADMIN — TAMSULOSIN HYDROCHLORIDE 0.4 MILLIGRAM(S): 0.4 CAPSULE ORAL at 21:50

## 2024-06-14 RX ADMIN — LIDOCAINE HCL 1 PATCH: 28 GEL TOPICAL at 19:33

## 2024-06-14 RX ADMIN — LIDOCAINE HCL 1 PATCH: 28 GEL TOPICAL at 17:07

## 2024-06-14 RX ADMIN — Medication 1000 MICROGRAM(S): at 11:41

## 2024-06-14 RX ADMIN — Medication 1 APPLICATION(S): at 17:07

## 2024-06-14 RX ADMIN — HEPARIN SODIUM 5000 UNIT(S): 50 INJECTION, SOLUTION INTRAVENOUS at 07:46

## 2024-06-15 LAB
MRSA PCR RESULT.: SIGNIFICANT CHANGE UP
S AUREUS DNA NOSE QL NAA+PROBE: SIGNIFICANT CHANGE UP

## 2024-06-15 RX ORDER — OFLOXACIN 3 MG/ML
1 SOLUTION/ DROPS OPHTHALMIC
Refills: 0 | Status: DISCONTINUED | OUTPATIENT
Start: 2024-06-15 | End: 2024-06-17

## 2024-06-15 RX ADMIN — Medication 25 MILLIGRAM(S): at 05:55

## 2024-06-15 RX ADMIN — OFLOXACIN 1 DROP(S): 3 SOLUTION/ DROPS OPHTHALMIC at 11:35

## 2024-06-15 RX ADMIN — OFLOXACIN 1 DROP(S): 3 SOLUTION/ DROPS OPHTHALMIC at 17:47

## 2024-06-15 RX ADMIN — CLOPIDOGREL BISULFATE 75 MILLIGRAM(S): 75 TABLET, FILM COATED ORAL at 11:35

## 2024-06-15 RX ADMIN — Medication 5 MILLIGRAM(S): at 21:13

## 2024-06-15 RX ADMIN — Medication 1 APPLICATION(S): at 11:37

## 2024-06-15 RX ADMIN — LIDOCAINE HCL 1 PATCH: 28 GEL TOPICAL at 05:52

## 2024-06-15 RX ADMIN — TAMSULOSIN HYDROCHLORIDE 0.4 MILLIGRAM(S): 0.4 CAPSULE ORAL at 21:12

## 2024-06-15 RX ADMIN — Medication 1000 UNIT(S): at 11:35

## 2024-06-15 RX ADMIN — Medication 2000 MICROGRAM(S): at 11:35

## 2024-06-15 RX ADMIN — Medication 1 MILLIGRAM(S): at 11:35

## 2024-06-15 RX ADMIN — LIDOCAINE HCL 1 PATCH: 28 GEL TOPICAL at 11:36

## 2024-06-15 RX ADMIN — HEPARIN SODIUM 5000 UNIT(S): 50 INJECTION, SOLUTION INTRAVENOUS at 05:55

## 2024-06-15 RX ADMIN — LIDOCAINE HCL 1 PATCH: 28 GEL TOPICAL at 23:33

## 2024-06-15 RX ADMIN — LIDOCAINE HCL 1 PATCH: 28 GEL TOPICAL at 19:10

## 2024-06-15 RX ADMIN — HEPARIN SODIUM 5000 UNIT(S): 50 INJECTION, SOLUTION INTRAVENOUS at 17:46

## 2024-06-16 RX ORDER — VALSARTAN 320 MG/1
160 TABLET ORAL DAILY
Refills: 0 | Status: DISCONTINUED | OUTPATIENT
Start: 2024-06-16 | End: 2024-06-17

## 2024-06-16 RX ORDER — VALPROIC ACID 250 MG/5ML
125 SOLUTION ORAL EVERY 8 HOURS
Refills: 0 | Status: DISCONTINUED | OUTPATIENT
Start: 2024-06-16 | End: 2024-06-17

## 2024-06-16 RX ORDER — POLYVINYL ALCOHOL, POVIDONE .5; .6 G/100ML; G/100ML
1 SOLUTION OPHTHALMIC THREE TIMES A DAY
Refills: 0 | Status: DISCONTINUED | OUTPATIENT
Start: 2024-06-16 | End: 2024-06-17

## 2024-06-16 RX ADMIN — POLYVINYL ALCOHOL, POVIDONE 1 DROP(S): .5; .6 SOLUTION OPHTHALMIC at 16:52

## 2024-06-16 RX ADMIN — Medication 25 MILLIGRAM(S): at 05:47

## 2024-06-16 RX ADMIN — Medication 2000 MICROGRAM(S): at 11:33

## 2024-06-16 RX ADMIN — Medication 1000 UNIT(S): at 11:35

## 2024-06-16 RX ADMIN — LIDOCAINE HCL 1 PATCH: 28 GEL TOPICAL at 11:33

## 2024-06-16 RX ADMIN — HEPARIN SODIUM 5000 UNIT(S): 50 INJECTION, SOLUTION INTRAVENOUS at 16:57

## 2024-06-16 RX ADMIN — CLOPIDOGREL BISULFATE 75 MILLIGRAM(S): 75 TABLET, FILM COATED ORAL at 11:32

## 2024-06-16 RX ADMIN — Medication 12.5 MILLIGRAM(S): at 14:49

## 2024-06-16 RX ADMIN — OFLOXACIN 1 DROP(S): 3 SOLUTION/ DROPS OPHTHALMIC at 00:35

## 2024-06-16 RX ADMIN — Medication 5 MILLIGRAM(S): at 21:36

## 2024-06-16 RX ADMIN — Medication 12.5 MILLIGRAM(S): at 21:36

## 2024-06-16 RX ADMIN — Medication 1 APPLICATION(S): at 11:41

## 2024-06-16 RX ADMIN — HEPARIN SODIUM 5000 UNIT(S): 50 INJECTION, SOLUTION INTRAVENOUS at 05:47

## 2024-06-16 RX ADMIN — VALPROIC ACID 51.25 MILLIGRAM(S): 250 SOLUTION ORAL at 16:50

## 2024-06-16 RX ADMIN — OFLOXACIN 1 DROP(S): 3 SOLUTION/ DROPS OPHTHALMIC at 05:47

## 2024-06-16 RX ADMIN — OFLOXACIN 1 DROP(S): 3 SOLUTION/ DROPS OPHTHALMIC at 11:32

## 2024-06-16 RX ADMIN — Medication 1 MILLIGRAM(S): at 11:32

## 2024-06-16 RX ADMIN — VALSARTAN 160 MILLIGRAM(S): 320 TABLET ORAL at 11:40

## 2024-06-16 RX ADMIN — TAMSULOSIN HYDROCHLORIDE 0.4 MILLIGRAM(S): 0.4 CAPSULE ORAL at 21:36

## 2024-06-16 RX ADMIN — LIDOCAINE HCL 1 PATCH: 28 GEL TOPICAL at 19:39

## 2024-06-16 RX ADMIN — OFLOXACIN 1 DROP(S): 3 SOLUTION/ DROPS OPHTHALMIC at 16:53

## 2024-06-16 RX ADMIN — LIDOCAINE HCL 1 PATCH: 28 GEL TOPICAL at 23:18

## 2024-06-17 ENCOUNTER — TRANSCRIPTION ENCOUNTER (OUTPATIENT)
Age: 89
End: 2024-06-17

## 2024-06-17 VITALS
SYSTOLIC BLOOD PRESSURE: 156 MMHG | HEART RATE: 56 BPM | RESPIRATION RATE: 18 BRPM | DIASTOLIC BLOOD PRESSURE: 52 MMHG | OXYGEN SATURATION: 96 % | TEMPERATURE: 98 F

## 2024-06-17 PROCEDURE — 82947 ASSAY GLUCOSE BLOOD QUANT: CPT

## 2024-06-17 PROCEDURE — 82553 CREATINE MB FRACTION: CPT

## 2024-06-17 PROCEDURE — 86200 CCP ANTIBODY: CPT

## 2024-06-17 PROCEDURE — 82607 VITAMIN B-12: CPT

## 2024-06-17 PROCEDURE — 86850 RBC ANTIBODY SCREEN: CPT

## 2024-06-17 PROCEDURE — 70450 CT HEAD/BRAIN W/O DYE: CPT | Mod: MC

## 2024-06-17 PROCEDURE — 80053 COMPREHEN METABOLIC PANEL: CPT

## 2024-06-17 PROCEDURE — 82330 ASSAY OF CALCIUM: CPT

## 2024-06-17 PROCEDURE — 84484 ASSAY OF TROPONIN QUANT: CPT

## 2024-06-17 PROCEDURE — 86900 BLOOD TYPING SEROLOGIC ABO: CPT

## 2024-06-17 PROCEDURE — 99285 EMERGENCY DEPT VISIT HI MDM: CPT | Mod: 25

## 2024-06-17 PROCEDURE — 82746 ASSAY OF FOLIC ACID SERUM: CPT

## 2024-06-17 PROCEDURE — 84436 ASSAY OF TOTAL THYROXINE: CPT

## 2024-06-17 PROCEDURE — 97161 PT EVAL LOW COMPLEX 20 MIN: CPT

## 2024-06-17 PROCEDURE — 84295 ASSAY OF SERUM SODIUM: CPT

## 2024-06-17 PROCEDURE — 80307 DRUG TEST PRSMV CHEM ANLYZR: CPT

## 2024-06-17 PROCEDURE — 81003 URINALYSIS AUTO W/O SCOPE: CPT

## 2024-06-17 PROCEDURE — 86901 BLOOD TYPING SEROLOGIC RH(D): CPT

## 2024-06-17 PROCEDURE — 71045 X-RAY EXAM CHEST 1 VIEW: CPT

## 2024-06-17 PROCEDURE — 36415 COLL VENOUS BLD VENIPUNCTURE: CPT

## 2024-06-17 PROCEDURE — 83036 HEMOGLOBIN GLYCOSYLATED A1C: CPT

## 2024-06-17 PROCEDURE — 72170 X-RAY EXAM OF PELVIS: CPT

## 2024-06-17 PROCEDURE — 84132 ASSAY OF SERUM POTASSIUM: CPT

## 2024-06-17 PROCEDURE — 84443 ASSAY THYROID STIM HORMONE: CPT

## 2024-06-17 PROCEDURE — 72125 CT NECK SPINE W/O DYE: CPT | Mod: MC

## 2024-06-17 PROCEDURE — 80048 BASIC METABOLIC PNL TOTAL CA: CPT

## 2024-06-17 PROCEDURE — 82435 ASSAY OF BLOOD CHLORIDE: CPT

## 2024-06-17 PROCEDURE — 74177 CT ABD & PELVIS W/CONTRAST: CPT | Mod: MC

## 2024-06-17 PROCEDURE — 84480 ASSAY TRIIODOTHYRONINE (T3): CPT

## 2024-06-17 PROCEDURE — 87641 MR-STAPH DNA AMP PROBE: CPT

## 2024-06-17 PROCEDURE — 85027 COMPLETE CBC AUTOMATED: CPT

## 2024-06-17 PROCEDURE — 71260 CT THORAX DX C+: CPT | Mod: MC

## 2024-06-17 PROCEDURE — 83520 IMMUNOASSAY QUANT NOS NONAB: CPT

## 2024-06-17 PROCEDURE — 93306 TTE W/DOPPLER COMPLETE: CPT

## 2024-06-17 PROCEDURE — 82550 ASSAY OF CK (CPK): CPT

## 2024-06-17 PROCEDURE — 85014 HEMATOCRIT: CPT

## 2024-06-17 PROCEDURE — 87640 STAPH A DNA AMP PROBE: CPT

## 2024-06-17 PROCEDURE — 84145 PROCALCITONIN (PCT): CPT

## 2024-06-17 PROCEDURE — 83605 ASSAY OF LACTIC ACID: CPT

## 2024-06-17 PROCEDURE — 85018 HEMOGLOBIN: CPT

## 2024-06-17 PROCEDURE — 83690 ASSAY OF LIPASE: CPT

## 2024-06-17 PROCEDURE — 82803 BLOOD GASES ANY COMBINATION: CPT

## 2024-06-17 PROCEDURE — 85025 COMPLETE CBC W/AUTO DIFF WBC: CPT

## 2024-06-17 RX ORDER — LIDOCAINE HCL 28 MG/G
1 GEL TOPICAL
Qty: 30 | Refills: 0
Start: 2024-06-17 | End: 2024-07-16

## 2024-06-17 RX ORDER — FOLIC ACID
1 POWDER (GRAM) MISCELLANEOUS
Qty: 30 | Refills: 0
Start: 2024-06-17 | End: 2024-07-16

## 2024-06-17 RX ORDER — CYANOCOBALAMIN (VITAMIN B-12) 1000 MCG
0 TABLET, EXTENDED RELEASE ORAL
Refills: 0 | DISCHARGE

## 2024-06-17 RX ORDER — CYANOCOBALAMIN (VITAMIN B-12) 1000 MCG
1 TABLET, EXTENDED RELEASE ORAL
Qty: 30 | Refills: 0
Start: 2024-06-17 | End: 2024-07-16

## 2024-06-17 RX ADMIN — Medication 1000 UNIT(S): at 12:18

## 2024-06-17 RX ADMIN — OFLOXACIN 1 DROP(S): 3 SOLUTION/ DROPS OPHTHALMIC at 00:07

## 2024-06-17 RX ADMIN — POLYVINYL ALCOHOL, POVIDONE 1 DROP(S): .5; .6 SOLUTION OPHTHALMIC at 13:28

## 2024-06-17 RX ADMIN — OFLOXACIN 1 DROP(S): 3 SOLUTION/ DROPS OPHTHALMIC at 12:19

## 2024-06-17 RX ADMIN — Medication 2000 MICROGRAM(S): at 12:18

## 2024-06-17 RX ADMIN — Medication 25 MILLIGRAM(S): at 05:16

## 2024-06-17 RX ADMIN — CLOPIDOGREL BISULFATE 75 MILLIGRAM(S): 75 TABLET, FILM COATED ORAL at 12:18

## 2024-06-17 RX ADMIN — LIDOCAINE HCL 1 PATCH: 28 GEL TOPICAL at 12:19

## 2024-06-17 RX ADMIN — HEPARIN SODIUM 5000 UNIT(S): 50 INJECTION, SOLUTION INTRAVENOUS at 05:15

## 2024-06-17 RX ADMIN — Medication 1 MILLIGRAM(S): at 12:19

## 2024-06-17 RX ADMIN — POLYVINYL ALCOHOL, POVIDONE 1 DROP(S): .5; .6 SOLUTION OPHTHALMIC at 05:16

## 2024-06-17 RX ADMIN — VALSARTAN 160 MILLIGRAM(S): 320 TABLET ORAL at 05:32

## 2024-06-17 RX ADMIN — Medication 1 APPLICATION(S): at 12:20

## 2024-06-17 RX ADMIN — OFLOXACIN 1 DROP(S): 3 SOLUTION/ DROPS OPHTHALMIC at 05:15

## 2024-06-18 LAB
RF IGA SER-ACNC: <5 U — SIGNIFICANT CHANGE UP
RF IGG SER-ACNC: <5 U — SIGNIFICANT CHANGE UP
RF IGM SER-ACNC: <5 U — SIGNIFICANT CHANGE UP

## 2024-07-08 ENCOUNTER — NON-APPOINTMENT (OUTPATIENT)
Age: 89
End: 2024-07-08

## 2024-07-09 ENCOUNTER — APPOINTMENT (OUTPATIENT)
Dept: OTOLARYNGOLOGY | Facility: CLINIC | Age: 89
End: 2024-07-09
Payer: MEDICARE

## 2024-07-09 VITALS — HEART RATE: 58 BPM | TEMPERATURE: 98 F | DIASTOLIC BLOOD PRESSURE: 54 MMHG | SYSTOLIC BLOOD PRESSURE: 133 MMHG

## 2024-07-09 DIAGNOSIS — B36.9 SUPERFICIAL MYCOSIS, UNSPECIFIED: ICD-10-CM

## 2024-07-09 DIAGNOSIS — H90.3 SENSORINEURAL HEARING LOSS, BILATERAL: ICD-10-CM

## 2024-07-09 DIAGNOSIS — H62.40 SUPERFICIAL MYCOSIS, UNSPECIFIED: ICD-10-CM

## 2024-07-09 DIAGNOSIS — H61.23 IMPACTED CERUMEN, BILATERAL: ICD-10-CM

## 2024-07-09 PROBLEM — D46.9 MYELODYSPLASTIC SYNDROME, UNSPECIFIED: Chronic | Status: ACTIVE | Noted: 2024-06-13

## 2024-07-09 PROCEDURE — 99213 OFFICE O/P EST LOW 20 MIN: CPT | Mod: 25

## 2024-07-09 PROCEDURE — 69210 REMOVE IMPACTED EAR WAX UNI: CPT

## 2024-07-09 RX ORDER — ACETIC ACID 20 MG/ML
2 SOLUTION AURICULAR (OTIC)
Qty: 1 | Refills: 0 | Status: ACTIVE | COMMUNITY
Start: 2024-07-09 | End: 1900-01-01

## 2024-08-06 ENCOUNTER — APPOINTMENT (OUTPATIENT)
Dept: OTOLARYNGOLOGY | Facility: CLINIC | Age: 89
End: 2024-08-06

## 2024-08-06 PROCEDURE — 99213 OFFICE O/P EST LOW 20 MIN: CPT | Mod: 25

## 2024-08-06 PROCEDURE — 69210 REMOVE IMPACTED EAR WAX UNI: CPT
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
## 2024-08-06 NOTE — HISTORY OF PRESENT ILLNESS
[de-identified] : Patient once again seems clogged and as if he cannot hear. He feels he has wax. He accumulates a lot of wax and has been feeling that there is an accumulation again. Historically he had fungus in the ear canals.  He used the drops for 10 days in the left ear after the last visit The last few days he has not worn his right hearing aid as he felt severely clogged

## 2024-08-06 NOTE — ASSESSMENT
[FreeTextEntry1] : Follow-up fungal infection stable now debris in the right side curetted out we will use VoSol again for another week follow-up and see us in 6 weeks heading in the right direction looking good.

## 2024-09-17 ENCOUNTER — APPOINTMENT (OUTPATIENT)
Dept: OTOLARYNGOLOGY | Facility: CLINIC | Age: 89
End: 2024-09-17

## 2024-09-20 ENCOUNTER — INPATIENT (INPATIENT)
Facility: HOSPITAL | Age: 89
LOS: 4 days | Discharge: NURSING, REHAB OR LONG-TERM CARE | DRG: 641 | End: 2024-09-25
Attending: HOSPITALIST | Admitting: STUDENT IN AN ORGANIZED HEALTH CARE EDUCATION/TRAINING PROGRAM
Payer: MEDICARE

## 2024-09-20 VITALS
OXYGEN SATURATION: 96 % | RESPIRATION RATE: 18 BRPM | WEIGHT: 132.06 LBS | DIASTOLIC BLOOD PRESSURE: 57 MMHG | TEMPERATURE: 98 F | HEIGHT: 66 IN | HEART RATE: 81 BPM | SYSTOLIC BLOOD PRESSURE: 132 MMHG

## 2024-09-20 DIAGNOSIS — Z98.890 OTHER SPECIFIED POSTPROCEDURAL STATES: Chronic | ICD-10-CM

## 2024-09-20 DIAGNOSIS — R62.7 ADULT FAILURE TO THRIVE: ICD-10-CM

## 2024-09-20 DIAGNOSIS — Z98.0 INTESTINAL BYPASS AND ANASTOMOSIS STATUS: Chronic | ICD-10-CM

## 2024-09-20 LAB
ALBUMIN SERPL ELPH-MCNC: 3.3 G/DL — SIGNIFICANT CHANGE UP (ref 3.3–5)
ALP SERPL-CCNC: 206 U/L — HIGH (ref 40–120)
ALT FLD-CCNC: 13 U/L — SIGNIFICANT CHANGE UP (ref 10–45)
ANION GAP SERPL CALC-SCNC: 8 MMOL/L — SIGNIFICANT CHANGE UP (ref 5–17)
ANISOCYTOSIS BLD QL: SIGNIFICANT CHANGE UP
APPEARANCE UR: CLEAR — SIGNIFICANT CHANGE UP
APTT BLD: 30.6 SEC — SIGNIFICANT CHANGE UP (ref 24.5–35.6)
AST SERPL-CCNC: 20 U/L — SIGNIFICANT CHANGE UP (ref 10–40)
BACTERIA # UR AUTO: NEGATIVE /HPF — SIGNIFICANT CHANGE UP
BASOPHILS # BLD AUTO: 0 K/UL — SIGNIFICANT CHANGE UP (ref 0–0.2)
BASOPHILS NFR BLD AUTO: 0 % — SIGNIFICANT CHANGE UP (ref 0–2)
BILIRUB SERPL-MCNC: 1.3 MG/DL — HIGH (ref 0.2–1.2)
BILIRUB UR-MCNC: NEGATIVE — SIGNIFICANT CHANGE UP
BUN SERPL-MCNC: 19 MG/DL — SIGNIFICANT CHANGE UP (ref 7–23)
BURR CELLS BLD QL SMEAR: PRESENT — SIGNIFICANT CHANGE UP
CALCIUM SERPL-MCNC: 8.5 MG/DL — SIGNIFICANT CHANGE UP (ref 8.4–10.5)
CHLORIDE SERPL-SCNC: 104 MMOL/L — SIGNIFICANT CHANGE UP (ref 96–108)
CO2 SERPL-SCNC: 27 MMOL/L — SIGNIFICANT CHANGE UP (ref 22–31)
COLOR SPEC: YELLOW — SIGNIFICANT CHANGE UP
CREAT SERPL-MCNC: 0.95 MG/DL — SIGNIFICANT CHANGE UP (ref 0.5–1.3)
DIFF PNL FLD: NEGATIVE — SIGNIFICANT CHANGE UP
EGFR: 72 ML/MIN/1.73M2 — SIGNIFICANT CHANGE UP
ELLIPTOCYTES BLD QL SMEAR: SLIGHT — SIGNIFICANT CHANGE UP
EOSINOPHIL # BLD AUTO: 0 K/UL — SIGNIFICANT CHANGE UP (ref 0–0.5)
EOSINOPHIL NFR BLD AUTO: 0 % — SIGNIFICANT CHANGE UP (ref 0–6)
EPI CELLS # UR: 1 — SIGNIFICANT CHANGE UP
FLUAV AG NPH QL: SIGNIFICANT CHANGE UP
FLUBV AG NPH QL: SIGNIFICANT CHANGE UP
GLUCOSE SERPL-MCNC: 92 MG/DL — SIGNIFICANT CHANGE UP (ref 70–99)
GLUCOSE UR QL: NEGATIVE MG/DL — SIGNIFICANT CHANGE UP
HCT VFR BLD CALC: 31 % — LOW (ref 39–50)
HGB BLD-MCNC: 10.1 G/DL — LOW (ref 13–17)
INR BLD: 1.42 RATIO — HIGH (ref 0.85–1.18)
KETONES UR-MCNC: 15 MG/DL
LACTATE SERPL-SCNC: 0.9 MMOL/L — SIGNIFICANT CHANGE UP (ref 0.7–2)
LEUKOCYTE ESTERASE UR-ACNC: ABNORMAL
LYMPHOCYTES # BLD AUTO: 0.79 K/UL — LOW (ref 1–3.3)
LYMPHOCYTES # BLD AUTO: 12 % — LOW (ref 13–44)
MACROCYTES BLD QL: SIGNIFICANT CHANGE UP
MANUAL SMEAR VERIFICATION: SIGNIFICANT CHANGE UP
MCHC RBC-ENTMCNC: 32.6 GM/DL — SIGNIFICANT CHANGE UP (ref 32–36)
MCHC RBC-ENTMCNC: 35.3 PG — HIGH (ref 27–34)
MCV RBC AUTO: 108.4 FL — HIGH (ref 80–100)
METAMYELOCYTES # FLD: 1 % — HIGH (ref 0–0)
MONOCYTES # BLD AUTO: 1.06 K/UL — HIGH (ref 0–0.9)
MONOCYTES NFR BLD AUTO: 16 % — HIGH (ref 2–14)
MYELOCYTES NFR BLD: 1 % — HIGH (ref 0–0)
NEUTROPHILS # BLD AUTO: 4.62 K/UL — SIGNIFICANT CHANGE UP (ref 1.8–7.4)
NEUTROPHILS NFR BLD AUTO: 67 % — SIGNIFICANT CHANGE UP (ref 43–77)
NEUTS BAND # BLD: 3 % — SIGNIFICANT CHANGE UP (ref 0–8)
NITRITE UR-MCNC: NEGATIVE — SIGNIFICANT CHANGE UP
NRBC # BLD: 0 /100 WBCS — SIGNIFICANT CHANGE UP (ref 0–0)
OVALOCYTES BLD QL SMEAR: SLIGHT — SIGNIFICANT CHANGE UP
PH UR: 5.5 — SIGNIFICANT CHANGE UP (ref 5–8)
PLAT MORPH BLD: NORMAL — SIGNIFICANT CHANGE UP
PLATELET # BLD AUTO: 190 K/UL — SIGNIFICANT CHANGE UP (ref 150–400)
POIKILOCYTOSIS BLD QL AUTO: SLIGHT — SIGNIFICANT CHANGE UP
POLYCHROMASIA BLD QL SMEAR: SLIGHT — SIGNIFICANT CHANGE UP
POTASSIUM SERPL-MCNC: 4.2 MMOL/L — SIGNIFICANT CHANGE UP (ref 3.5–5.3)
POTASSIUM SERPL-SCNC: 4.2 MMOL/L — SIGNIFICANT CHANGE UP (ref 3.5–5.3)
PROT SERPL-MCNC: 6.1 G/DL — SIGNIFICANT CHANGE UP (ref 6–8.3)
PROT UR-MCNC: SIGNIFICANT CHANGE UP MG/DL
PROTHROM AB SERPL-ACNC: 16 SEC — HIGH (ref 9.5–13)
RBC # BLD: 2.86 M/UL — LOW (ref 4.2–5.8)
RBC # FLD: 20.1 % — HIGH (ref 10.3–14.5)
RBC BLD AUTO: ABNORMAL
RBC CASTS # UR COMP ASSIST: 1 /HPF — SIGNIFICANT CHANGE UP (ref 0–4)
RSV RNA NPH QL NAA+NON-PROBE: SIGNIFICANT CHANGE UP
SARS-COV-2 RNA SPEC QL NAA+PROBE: DETECTED
SCHISTOCYTES BLD QL AUTO: SLIGHT — SIGNIFICANT CHANGE UP
SODIUM SERPL-SCNC: 139 MMOL/L — SIGNIFICANT CHANGE UP (ref 135–145)
SP GR SPEC: 1.02 — SIGNIFICANT CHANGE UP (ref 1–1.03)
STOMATOCYTES BLD QL SMEAR: SLIGHT — SIGNIFICANT CHANGE UP
TARGETS BLD QL SMEAR: SLIGHT — SIGNIFICANT CHANGE UP
TROPONIN I, HIGH SENSITIVITY RESULT: 31.7 NG/L — SIGNIFICANT CHANGE UP
UROBILINOGEN FLD QL: 1 MG/DL — SIGNIFICANT CHANGE UP (ref 0.2–1)
WBC # BLD: 6.6 K/UL — SIGNIFICANT CHANGE UP (ref 3.8–10.5)
WBC # FLD AUTO: 6.6 K/UL — SIGNIFICANT CHANGE UP (ref 3.8–10.5)
WBC UR QL: 1 /HPF — SIGNIFICANT CHANGE UP (ref 0–5)

## 2024-09-20 PROCEDURE — 71045 X-RAY EXAM CHEST 1 VIEW: CPT | Mod: 26

## 2024-09-20 PROCEDURE — 99223 1ST HOSP IP/OBS HIGH 75: CPT | Mod: GC

## 2024-09-20 PROCEDURE — 99497 ADVNCD CARE PLAN 30 MIN: CPT | Mod: 25,GC

## 2024-09-20 PROCEDURE — 93010 ELECTROCARDIOGRAM REPORT: CPT

## 2024-09-20 PROCEDURE — 99285 EMERGENCY DEPT VISIT HI MDM: CPT

## 2024-09-20 RX ORDER — FERROUS SULFATE 325(65) MG
325 TABLET ORAL DAILY
Refills: 0 | Status: DISCONTINUED | OUTPATIENT
Start: 2024-09-20 | End: 2024-09-25

## 2024-09-20 RX ORDER — CEFEPIME 2 G/1
1000 INJECTION, POWDER, FOR SOLUTION INTRAVENOUS ONCE
Refills: 0 | Status: COMPLETED | OUTPATIENT
Start: 2024-09-20 | End: 2024-09-20

## 2024-09-20 RX ORDER — 5-HYDROXYTRYPTOPHAN (5-HTP) 100 MG
3 TABLET,DISINTEGRATING ORAL AT BEDTIME
Refills: 0 | Status: DISCONTINUED | OUTPATIENT
Start: 2024-09-20 | End: 2024-09-25

## 2024-09-20 RX ORDER — SODIUM CHLORIDE 0.9 % (FLUSH) 0.9 %
2000 SYRINGE (ML) INJECTION ONCE
Refills: 0 | Status: COMPLETED | OUTPATIENT
Start: 2024-09-20 | End: 2024-09-20

## 2024-09-20 RX ORDER — SERTRALINE HYDROCHLORIDE 100 MG/1
100 TABLET, FILM COATED ORAL DAILY
Refills: 0 | Status: DISCONTINUED | OUTPATIENT
Start: 2024-09-20 | End: 2024-09-25

## 2024-09-20 RX ORDER — REMDESIVIR 5 MG/ML
200 INJECTION INTRAVENOUS EVERY 24 HOURS
Refills: 0 | Status: COMPLETED | OUTPATIENT
Start: 2024-09-20 | End: 2024-09-21

## 2024-09-20 RX ORDER — VALSARTAN 320 MG/1
160 TABLET ORAL DAILY
Refills: 0 | Status: DISCONTINUED | OUTPATIENT
Start: 2024-09-20 | End: 2024-09-20

## 2024-09-20 RX ORDER — TAMSULOSIN HCL 0.4 MG
0.4 CAPSULE ORAL AT BEDTIME
Refills: 0 | Status: DISCONTINUED | OUTPATIENT
Start: 2024-09-20 | End: 2024-09-25

## 2024-09-20 RX ORDER — ACETAMINOPHEN 325 MG
650 TABLET ORAL EVERY 6 HOURS
Refills: 0 | Status: DISCONTINUED | OUTPATIENT
Start: 2024-09-20 | End: 2024-09-25

## 2024-09-20 RX ORDER — METOPROLOL TARTRATE 50 MG
25 TABLET ORAL DAILY
Refills: 0 | Status: DISCONTINUED | OUTPATIENT
Start: 2024-09-20 | End: 2024-09-20

## 2024-09-20 RX ORDER — METOPROLOL TARTRATE 50 MG
25 TABLET ORAL DAILY
Refills: 0 | Status: DISCONTINUED | OUTPATIENT
Start: 2024-09-20 | End: 2024-09-25

## 2024-09-20 RX ORDER — VALSARTAN 320 MG/1
160 TABLET ORAL DAILY
Refills: 0 | Status: DISCONTINUED | OUTPATIENT
Start: 2024-09-20 | End: 2024-09-24

## 2024-09-20 RX ORDER — MAG HYDROX/ALUMINUM HYD/SIMETH 200-200-20
30 SUSPENSION, ORAL (FINAL DOSE FORM) ORAL EVERY 4 HOURS
Refills: 0 | Status: DISCONTINUED | OUTPATIENT
Start: 2024-09-20 | End: 2024-09-25

## 2024-09-20 RX ORDER — ACETAMINOPHEN 325 MG
650 TABLET ORAL ONCE
Refills: 0 | Status: COMPLETED | OUTPATIENT
Start: 2024-09-20 | End: 2024-09-20

## 2024-09-20 RX ORDER — ENOXAPARIN SODIUM 150 MG/ML
40 INJECTION SUBCUTANEOUS EVERY 24 HOURS
Refills: 0 | Status: DISCONTINUED | OUTPATIENT
Start: 2024-09-20 | End: 2024-09-25

## 2024-09-20 RX ORDER — REMDESIVIR 5 MG/ML
INJECTION INTRAVENOUS
Refills: 0 | Status: DISCONTINUED | OUTPATIENT
Start: 2024-09-20 | End: 2024-09-23

## 2024-09-20 RX ORDER — SODIUM CHLORIDE 0.9 % (FLUSH) 0.9 %
1000 SYRINGE (ML) INJECTION
Refills: 0 | Status: DISCONTINUED | OUTPATIENT
Start: 2024-09-20 | End: 2024-09-23

## 2024-09-20 RX ADMIN — Medication 3 MILLIGRAM(S): at 22:05

## 2024-09-20 RX ADMIN — SERTRALINE HYDROCHLORIDE 100 MILLIGRAM(S): 100 TABLET, FILM COATED ORAL at 22:43

## 2024-09-20 RX ADMIN — Medication 650 MILLIGRAM(S): at 21:33

## 2024-09-20 RX ADMIN — Medication 0.4 MILLIGRAM(S): at 22:43

## 2024-09-20 RX ADMIN — Medication 70 MILLILITER(S): at 23:26

## 2024-09-20 RX ADMIN — Medication 2000 MILLILITER(S): at 17:09

## 2024-09-20 RX ADMIN — CEFEPIME 100 MILLIGRAM(S): 2 INJECTION, POWDER, FOR SOLUTION INTRAVENOUS at 17:09

## 2024-09-20 RX ADMIN — Medication 650 MILLIGRAM(S): at 21:04

## 2024-09-20 NOTE — ED ADULT NURSE NOTE - NSFALLHARMRISKINTERV_ED_ALL_ED
Assistance OOB with selected safe patient handling equipment if applicable/Communicate risk of Fall with Harm to all staff, patient, and family/Monitor for mental status changes and reorient to person, place, and time, as needed/Move patient closer to nursing station/within visual sight of ED staff/Provide visual cue: red socks, yellow wristband, yellow gown, etc/Reinforce activity limits and safety measures with patient and family/Toileting schedule using arm’s reach rule for commode and bathroom/Use of alarms - bed, stretcher, chair and/or video monitoring/Bed in lowest position, wheels locked, appropriate side rails in place/Call bell, personal items and telephone in reach/Instruct patient to call for assistance before getting out of bed/chair/stretcher/Non-slip footwear applied when patient is off stretcher/[place] to call system/Physically safe environment - no spills, clutter or unnecessary equipment/Purposeful Proactive Rounding/Room/bathroom lighting operational, light cord in reach

## 2024-09-20 NOTE — ED PROVIDER NOTE - NSICDXPASTMEDICALHX_GEN_ALL_CORE_FT
PAST MEDICAL HISTORY:  Alzheimer disease     Anemia     Hypertension     Myelodysplastic syndrome

## 2024-09-20 NOTE — PATIENT PROFILE ADULT - SPECIFY REASON UNABLE TO ASSESS:
Pt's daughter unsure Gabapentin Pregnancy And Lactation Text: This medication is Pregnancy Category C and isn't considered safe during pregnancy. It is excreted in breast milk.

## 2024-09-20 NOTE — GOALS OF CARE CONVERSATION - ADVANCED CARE PLANNING - CONVERSATION DETAILS
spoke with dtr on the phone who is the HCP,   - pt is Full code, family to discuss re: dnr/dni. and let us know , time spent in goc 16 min.

## 2024-09-20 NOTE — H&P ADULT - ATTENDING COMMENTS
89 yo male pmhx. of CAD, CHF, BPH, GERD, aortic stenosis s/p TAVR, dementia, HTN, myelodysplastic syndrome presenting for weakness and fever of 102 at home. Pt unable to provide history. Daughter at bedside but also unable to provide history since pt is usually with aid who is also sick,  pt was exposed to home aide who was positive for COVID and pt has also not been eating or drinking for the past 2 weeks, PCP concerned about failure to thrive. Obtained collateral from pt's emergency contact son Ilya. Pt is with an aid . Recently had to hire a second aid because pt started getting up in the middle of the night to urinate and is a fall risk, and aid was not getting enough rest. As far as he knows,     ED course  -T101.2F, HR 76, /55, RR 18, SpO2 97% on RA on arrival, Covid positive, CXR no obvious consolidations or infiltrates, Started on cefepime    Vital Signs Last 24 Hrs  T(C): 36.3 (20 Sep 2024 21:33), Max: 38.4 (20 Sep 2024 16:30)  T(F): 97.4 (20 Sep 2024 21:33), Max: 101.2 (20 Sep 2024 16:30)  HR: 71 (20 Sep 2024 21:33) (71 - 81)  BP: 151/62 (20 Sep 2024 21:33) (130/57 - 152/80)  BP(mean): 107 (20 Sep 2024 19:00) (74 - 107)  RR: 18 (20 Sep 2024 21:33) (17 - 20)  SpO2: 97% (20 Sep 2024 21:33) (96% - 99%)    GENERAL- NAD  NECK- supple  RESPIRATORY-  clear to auscultation bilaterally  CARDIOVASCULAR - SIS2, RRR  GI - soft NT BS present  EXTREMITIES- no pedal edema  NEUROLOGY- no gross focal deficits                              10.1   6.60  )-----------( 190      ( 20 Sep 2024 16:56 )             31.0       09-20    139  |  104  |  19  ----------------------------<  92  4.2   |  27  |  0.95    Ca    8.5      20 Sep 2024 16:56    TPro  6.1  /  Alb  3.3  /  TBili  1.3[H]  /  DBili  x   /  AST  20  /  ALT  13  /  AlkPhos  206[H]            Urinalysis Basic - ( 20 Sep 2024 18:42 )    Color: Yellow / Appearance: Clear / S.021 / pH: x  Gluc: x / Ketone: 15 mg/dL  / Bili: Negative / Urobili: 1.0 mg/dL   Blood: x / Protein: Trace mg/dL / Nitrite: Negative   Leuk Esterase: Trace / RBC: 1 /HPF / WBC 1 /HPF   Sq Epi: x / Non Sq Epi: x / Bacteria: Negative /HPF    PT/INR - ( 20 Sep 2024 16:56 )   PT: 16.0 sec;   INR: 1.42 ratio      Lactate Trend   @ 16:56 Lactate:0.9     Labs reviewed:     CXR personally reviewed: napd    ECG reviewed and interpreted: sinus 78     a/p-  #Covid positive- Admit to medicine, remdesvir, Enhanced supervision, Fall precautions  #HTN  -C/w metoprolol succinate 25 mg qd  -C/w valsartan 160 mg qd    #TAVR  -C/w Plavix 75 mg qd    #Myelodysplastic syndrome  #Anemia  -C/w iron supplement    #BPH  -C/w Flomax     #Depression  -C/w sertraline     VTE PPX -  Lovenox     spoke with dtr on the phone- pt is Full code, family to discuss re: dnr/dni. and let us know , time spent in goc 16 min 89 yo male pmhx. of CAD, CHF, BPH, GERD, aortic stenosis s/p TAVR, dementia, HTN, myelodysplastic syndrome presenting for weakness and fever of 102 at home. Pt unable to provide history. Daughter at bedside but also unable to provide history since pt is usually with aid who is also sick,  pt was exposed to home aide who was positive for COVID and pt has also not been eating or drinking for the past 2 weeks, PCP concerned about failure to thrive. Obtained collateral from pt's emergency contact son Ilya. Pt is with an aid . Recently had to hire a second aid because pt started getting up in the middle of the night to urinate and is a fall risk, and aid was not getting enough rest. As far as he knows,     ED course  -T101.2F, HR 76, /55, RR 18, SpO2 97% on RA on arrival, Covid positive, CXR no obvious consolidations or infiltrates, Started on cefepime    Vital Signs Last 24 Hrs  T(C): 36.3 (20 Sep 2024 21:33), Max: 38.4 (20 Sep 2024 16:30)  T(F): 97.4 (20 Sep 2024 21:33), Max: 101.2 (20 Sep 2024 16:30)  HR: 71 (20 Sep 2024 21:33) (71 - 81)  BP: 151/62 (20 Sep 2024 21:33) (130/57 - 152/80)  BP(mean): 107 (20 Sep 2024 19:00) (74 - 107)  RR: 18 (20 Sep 2024 21:33) (17 - 20)  SpO2: 97% (20 Sep 2024 21:33) (96% - 99%)    GENERAL- NAD  NECK- supple  RESPIRATORY-  clear to auscultation bilaterally  CARDIOVASCULAR - SIS2, RRR  GI - soft NT BS present  EXTREMITIES- no pedal edema  NEUROLOGY- no gross focal deficits                              10.1   6.60  )-----------( 190      ( 20 Sep 2024 16:56 )             31.0       09-20    139  |  104  |  19  ----------------------------<  92  4.2   |  27  |  0.95    Ca    8.5      20 Sep 2024 16:56    TPro  6.1  /  Alb  3.3  /  TBili  1.3[H]  /  DBili  x   /  AST  20  /  ALT  13  /  AlkPhos  206[H]            Urinalysis Basic - ( 20 Sep 2024 18:42 )    Color: Yellow / Appearance: Clear / S.021 / pH: x  Gluc: x / Ketone: 15 mg/dL  / Bili: Negative / Urobili: 1.0 mg/dL   Blood: x / Protein: Trace mg/dL / Nitrite: Negative   Leuk Esterase: Trace / RBC: 1 /HPF / WBC 1 /HPF   Sq Epi: x / Non Sq Epi: x / Bacteria: Negative /HPF    PT/INR - ( 20 Sep 2024 16:56 )   PT: 16.0 sec;   INR: 1.42 ratio      Lactate Trend   @ 16:56 Lactate:0.9     Labs reviewed:     CXR personally reviewed: napd    ECG reviewed and interpreted: sinus 78     a/p-  #Covid positive- Admit to medicine, remdesvir, Enhanced supervision, Fall precautions  family unable to take pt home since lives alone with 24 hr aide and aide has covid    #HTN  -C/w metoprolol succinate 25 mg qd  -C/w valsartan 160 mg qd    #TAVR  -C/w Plavix 75 mg qd    #Myelodysplastic syndrome  #Anemia  -C/w iron supplement    #BPH  -C/w Flomax     #Depression  -C/w sertraline     VTE PPX -  Lovenox     spoke with dtr on the phone- pt is Full code, family to discuss re: dnr/dni. and let us know , time spent in goc 16 min 89 yo male pmhx. of CAD, CHF, BPH, GERD, aortic stenosis s/p TAVR, dementia, HTN, myelodysplastic syndrome presenting for weakness and fever of 102 at home. Pt unable to provide history. Daughter at bedside but also unable to provide history since pt is usually with aid who is also sick,  pt was exposed to home aide who was positive for COVID and pt has also not been eating or drinking for the past 2 weeks, PCP concerned about failure to thrive. Obtained collateral from pt's emergency contact son Ilya. Pt is with an aid . Recently had to hire a second aid because pt started getting up in the middle of the night to urinate and is a fall risk, and aid was not getting enough rest. As far as he knows,     ED course  -T101.2F, HR 76, /55, RR 18, SpO2 97% on RA on arrival, Covid positive, CXR no obvious consolidations or infiltrates, Started on cefepime    Vital Signs Last 24 Hrs  T(C): 36.3 (20 Sep 2024 21:33), Max: 38.4 (20 Sep 2024 16:30)  T(F): 97.4 (20 Sep 2024 21:33), Max: 101.2 (20 Sep 2024 16:30)  HR: 71 (20 Sep 2024 21:33) (71 - 81)  BP: 151/62 (20 Sep 2024 21:33) (130/57 - 152/80)  BP(mean): 107 (20 Sep 2024 19:00) (74 - 107)  RR: 18 (20 Sep 2024 21:33) (17 - 20)  SpO2: 97% (20 Sep 2024 21:33) (96% - 99%)    GENERAL- NAD  NECK- supple  RESPIRATORY-  clear to auscultation bilaterally  CARDIOVASCULAR - SIS2, RRR  GI - soft NT BS present  EXTREMITIES- no pedal edema  NEUROLOGY- no gross focal deficits                 10.1   6.60  )-----------( 190      ( 20 Sep 2024 16:56 )             31.0       09-20    139  |  104  |  19  ----------------------------<  92  4.2   |  27  |  0.95    Ca    8.5      20 Sep 2024 16:56    TPro  6.1  /  Alb  3.3  /  TBili  1.3[H]  /  DBili  x   /  AST  20  /  ALT  13  /  AlkPhos  206[H]            Urinalysis Basic - ( 20 Sep 2024 18:42 )    Color: Yellow / Appearance: Clear / S.021 / pH: x  Gluc: x / Ketone: 15 mg/dL  / Bili: Negative / Urobili: 1.0 mg/dL   Blood: x / Protein: Trace mg/dL / Nitrite: Negative   Leuk Esterase: Trace / RBC: 1 /HPF / WBC 1 /HPF   Sq Epi: x / Non Sq Epi: x / Bacteria: Negative /HPF    PT/INR - ( 20 Sep 2024 16:56 )   PT: 16.0 sec;   INR: 1.42 ratio      Lactate Trend   @ 16:56 Lactate:0.9     Labs reviewed:     CXR personally reviewed: napd    ECG reviewed and interpreted: sinus 78     a/p-  #weakness, Covid positive- Admit to medicine, remdesvir, Enhanced supervision, Fall precautions  family unable to take pt home since lives alone with 24 hr aide and aide has covid    #HTN  -C/w metoprolol succinate 25 mg qd  -C/w valsartan 160 mg qd    #TAVR  -C/w Plavix 75 mg qd    #Myelodysplastic syndrome  #Anemia  -C/w iron supplement    #BPH  -C/w Flomax     #Depression  -C/w sertraline     VTE PPX -  Lovenox     spoke with dtr on the phone- pt is Full code, family to discuss re: dnr/dni. and let us know , time spent in goc 16 min

## 2024-09-20 NOTE — H&P ADULT - HISTORY OF PRESENT ILLNESS
89 yo male pm    ED course  -T101.2F, HR 76, /55, RR 18, SpO2 97% on RA on arrival  -Covid positive  -UA unremarkable for infection  -CXR no obvious consolidations or infiltrates  -s/p [facility] NS  -UCx, BCx pending 89 yo male pmhx. of CAD, CHF, BPH, GERD, aortic stenosis s/p TAVR, dementia, HTN, myelodysplastic syndrome presenting for weakness and fever of 102 at home. Pt unable to provide history. Daughter at bedside but also unable to provide history since pt is usually with aid who is also sick. Based on chart review, pt was exposed to home aid who was positive for COVID and pt has also not been eating or drinking for the past 2 weeks, PCP concerned about failure to thrive. Obtained collateral from pt's emergency contact son Ilya. Pt is with an aid 24/7. Recently had to hire a second aid because pt started getting up in the middle of the night to urinate and is a fall risk, and aid was not getting enough rest. As far as he knows, pt does not have history of stroke, MI, or DVT/PE.      ED course  -T101.2F, HR 76, /55, RR 18, SpO2 97% on RA on arrival  -Covid positive  -UA unremarkable for infection  -CXR no obvious consolidations or infiltrates  -s/p 2000cc NS  -Started on cefepime

## 2024-09-20 NOTE — ED ADULT TRIAGE NOTE - CHIEF COMPLAINT QUOTE
Patient complaint of weakness for the past couple of days. Patient was exposed to COVID. Denies any trips, falls or hitting his head.

## 2024-09-20 NOTE — ED PROVIDER NOTE - CLINICAL SUMMARY MEDICAL DECISION MAKING FREE TEXT BOX
89-year-old male history of coronary artery disease congestive heart failure from home came to the emergency room with a chief complaint of fever Tmax 102 at home patient was recently exposed to the home attendant who was positive for COVID patient's primary care doctor Dr. Rodney Gerber telephone number [phone] reported that the patient is not eating and drinking for the past 2 weeks prior to the COVID exposure and he was concerned about failure to thrive  Patient's lab work shows the patient is COVID-positive with hemoglobin of 10 CMP within normal limitsEKG normal sinus rhythm at 78  Patient's primary care doctor was also suggesting short-term rehab for the patient

## 2024-09-20 NOTE — H&P ADULT - NSHPPHYSICALEXAM_GEN_ALL_CORE
GENERAL: NAD, lying in bed comfortably  HEAD:  Atraumatic, normocephalic  EYES: EOMI, conjunctiva and sclera clear  HEART: Regular rate and rhythm, systolic murmur present  LUNGS: Unlabored respirations. Clear to auscultation bilaterally, no crackles, wheezing, or rhonchi  ABDOMEN: Soft, nontender, nondistended, +BS  EXTREMITIES: No lower extremity edema  NERVOUS SYSTEM: A&Ox1  SKIN: No rashes or lesions

## 2024-09-20 NOTE — PATIENT PROFILE ADULT - FALL HARM RISK - HARM RISK INTERVENTIONS

## 2024-09-20 NOTE — H&P ADULT - ASSESSMENT
89 yo male pmhx. of CAD, CHF, BPH, GERD, aortic stenosis, prosthetic heart valve, dementia, HTN, myelodysplastic syndrome presenting for weakness and fever of 102 at home admitted for covid positive and failure to thrive.    #Covid positive  #Failure to thrive  -Admit to medicine  -T101.2F, HR 76, /55, RR 18, SpO2 97% on RA in ED  -No leukocytosis, lactate 0.9  -Trop 31.7  -UA unremarkable for infection  -CXR no obvious consolidations or infiltrates  -s/p 2000cc NS in ED  -s/p cefepime in ED  -Gentle fluids  -Start Remdesivir  -F/u UCx, BCx  -Mg and Phos in AM  -Nutrition consult  -SLP eval  -PT consult  -Enhanced supervision  -Fall precautions  -Tele    #HTN  -C/w metoprolol succinate 25 mg qd  -C/w valsartan 160 mg qd    #TAVR  -C/w Plavix 75 mg qd    #Myelodysplastic syndrome  #Anemia  -C/w iron supplement    #BPH  -C/w Flomax 0.4 mg qd    #Depression  -C/w sertraline 100 mg qhs    Diet - soft and bite sized  VTE PPX - Plavix  Full code         89 yo male pmhx. of CAD, CHF, BPH, GERD, aortic stenosis, prosthetic heart valve, dementia, HTN, myelodysplastic syndrome presenting for weakness and fever of 102 at home admitted for covid positive and failure to thrive.    #Covid positive  -Admit to medicine  -T101.2F, HR 76, /55, RR 18, SpO2 97% on RA in ED  -No leukocytosis, lactate 0.9  -Trop 31.7  -UA unremarkable for infection  -CXR no obvious consolidations or infiltrates  -s/p 2000cc NS in ED  -s/p cefepime in ED  -Gentle fluids  -Start Remdesivir  -F/u UCx, BCx  -Mg and Phos in AM  -Nutrition consult  -SLP eval  -PT consult  -Enhanced supervision  -Fall precautions  -Tele    #HTN  -C/w metoprolol succinate 25 mg qd  -C/w valsartan 160 mg qd    #TAVR  -C/w Plavix 75 mg qd    #Myelodysplastic syndrome  #Anemia  -C/w iron supplement    #BPH  -C/w Flomax 0.4 mg qd    #Depression  -C/w sertraline 100 mg qhs    Diet - soft and bite sized  VTE PPX -  lovenox   Full code         89 yo male pmhx. of CAD, CHF, BPH, GERD, aortic stenosis, prosthetic heart valve, dementia, HTN, myelodysplastic syndrome presenting for weakness and fever of 102 at home admitted for weakness 2/2 covid.    #Weakness  #Covid positive  -Admit to medicine  -T101.2F, HR 76, /55, RR 18, SpO2 97% on RA in ED  -No leukocytosis, lactate 0.9  -Trop 31.7  -UA unremarkable for infection  -CXR no obvious consolidations or infiltrates  -s/p 2000cc NS in ED  -s/p cefepime in ED  -Gentle fluids  -Start Remdesivir  -F/u UCx, BCx  -Mg and Phos in AM  -Nutrition consult  -SLP eval  -PT/OT consult for potential placement, pt's 24/7 is sick and cannot help at this time  -Enhanced supervision - pt has 24/7 aid at home  -Fall precautions  -Tele    #HTN  -C/w metoprolol succinate 25 mg qd  -C/w valsartan 160 mg qd    #TAVR  -C/w Plavix 75 mg qd    #Myelodysplastic syndrome  #Anemia  -C/w iron supplement    #BPH  -C/w Flomax 0.4 mg qd    #Depression  -C/w sertraline 100 mg qhs    Diet - soft and bite sized  VTE PPX -  lovenox   Full code    Discussed with Dr. Felix

## 2024-09-20 NOTE — ED PROVIDER NOTE - OBJECTIVE STATEMENT
89-year-old male history of coronary artery disease congestive heart failure from home came to the emergency room with a chief complaint of fever Tmax 102 at home patient was recently exposed to the home attendant who was positive for COVID patient's primary care doctor Dr. Rodney Gerber telephone number [phone] reported that the patient is not eating and drinking for the past 2 weeks prior to the COVID exposure and he was concerned about failure to thrive

## 2024-09-21 LAB
ALBUMIN SERPL ELPH-MCNC: 3 G/DL — LOW (ref 3.3–5)
ALBUMIN SERPL ELPH-MCNC: 3.1 G/DL — LOW (ref 3.3–5)
ALP SERPL-CCNC: 185 U/L — HIGH (ref 40–120)
ALP SERPL-CCNC: 187 U/L — HIGH (ref 40–120)
ALT FLD-CCNC: 15 U/L — SIGNIFICANT CHANGE UP (ref 10–45)
ALT FLD-CCNC: 15 U/L — SIGNIFICANT CHANGE UP (ref 10–45)
ANION GAP SERPL CALC-SCNC: 10 MMOL/L — SIGNIFICANT CHANGE UP (ref 5–17)
AST SERPL-CCNC: 20 U/L — SIGNIFICANT CHANGE UP (ref 10–40)
AST SERPL-CCNC: 22 U/L — SIGNIFICANT CHANGE UP (ref 10–40)
BASOPHILS # BLD AUTO: 0.03 K/UL — SIGNIFICANT CHANGE UP (ref 0–0.2)
BASOPHILS NFR BLD AUTO: 0.9 % — SIGNIFICANT CHANGE UP (ref 0–2)
BILIRUB DIRECT SERPL-MCNC: 0.3 MG/DL — SIGNIFICANT CHANGE UP (ref 0–0.3)
BILIRUB INDIRECT FLD-MCNC: 1 MG/DL — SIGNIFICANT CHANGE UP (ref 0.2–1)
BILIRUB SERPL-MCNC: 1.3 MG/DL — HIGH (ref 0.2–1.2)
BILIRUB SERPL-MCNC: 1.4 MG/DL — HIGH (ref 0.2–1.2)
BUN SERPL-MCNC: 20 MG/DL — SIGNIFICANT CHANGE UP (ref 7–23)
CALCIUM SERPL-MCNC: 8 MG/DL — LOW (ref 8.4–10.5)
CHLORIDE SERPL-SCNC: 107 MMOL/L — SIGNIFICANT CHANGE UP (ref 96–108)
CO2 SERPL-SCNC: 24 MMOL/L — SIGNIFICANT CHANGE UP (ref 22–31)
CREAT SERPL-MCNC: 0.87 MG/DL — SIGNIFICANT CHANGE UP (ref 0.5–1.3)
CREAT SERPL-MCNC: 0.93 MG/DL — SIGNIFICANT CHANGE UP (ref 0.5–1.3)
EGFR: 74 ML/MIN/1.73M2 — SIGNIFICANT CHANGE UP
EGFR: 78 ML/MIN/1.73M2 — SIGNIFICANT CHANGE UP
EOSINOPHIL # BLD AUTO: 0 K/UL — SIGNIFICANT CHANGE UP (ref 0–0.5)
EOSINOPHIL NFR BLD AUTO: 0 % — SIGNIFICANT CHANGE UP (ref 0–6)
GLUCOSE SERPL-MCNC: 87 MG/DL — SIGNIFICANT CHANGE UP (ref 70–99)
HCT VFR BLD CALC: 29.9 % — LOW (ref 39–50)
HGB BLD-MCNC: 9.6 G/DL — LOW (ref 13–17)
IMM GRANULOCYTES NFR BLD AUTO: 2.6 % — HIGH (ref 0–0.9)
INR BLD: 1.46 RATIO — HIGH (ref 0.85–1.18)
LYMPHOCYTES # BLD AUTO: 0.79 K/UL — LOW (ref 1–3.3)
LYMPHOCYTES # BLD AUTO: 22.9 % — SIGNIFICANT CHANGE UP (ref 13–44)
MAGNESIUM SERPL-MCNC: 1.8 MG/DL — SIGNIFICANT CHANGE UP (ref 1.6–2.6)
MCHC RBC-ENTMCNC: 32.1 GM/DL — SIGNIFICANT CHANGE UP (ref 32–36)
MCHC RBC-ENTMCNC: 35.3 PG — HIGH (ref 27–34)
MCV RBC AUTO: 109.9 FL — HIGH (ref 80–100)
MONOCYTES # BLD AUTO: 0.74 K/UL — SIGNIFICANT CHANGE UP (ref 0–0.9)
MONOCYTES NFR BLD AUTO: 21.4 % — HIGH (ref 2–14)
NEUTROPHILS # BLD AUTO: 1.8 K/UL — SIGNIFICANT CHANGE UP (ref 1.8–7.4)
NEUTROPHILS NFR BLD AUTO: 52.2 % — SIGNIFICANT CHANGE UP (ref 43–77)
NRBC # BLD: 0 /100 WBCS — SIGNIFICANT CHANGE UP (ref 0–0)
PHOSPHATE SERPL-MCNC: 3.6 MG/DL — SIGNIFICANT CHANGE UP (ref 2.5–4.5)
PLATELET # BLD AUTO: 142 K/UL — LOW (ref 150–400)
POTASSIUM SERPL-MCNC: 3.9 MMOL/L — SIGNIFICANT CHANGE UP (ref 3.5–5.3)
POTASSIUM SERPL-SCNC: 3.9 MMOL/L — SIGNIFICANT CHANGE UP (ref 3.5–5.3)
PROT SERPL-MCNC: 5.9 G/DL — LOW (ref 6–8.3)
PROT SERPL-MCNC: 5.9 G/DL — LOW (ref 6–8.3)
PROTHROM AB SERPL-ACNC: 16.9 SEC — HIGH (ref 9.5–13)
RBC # BLD: 2.72 M/UL — LOW (ref 4.2–5.8)
RBC # FLD: 20.4 % — HIGH (ref 10.3–14.5)
SODIUM SERPL-SCNC: 141 MMOL/L — SIGNIFICANT CHANGE UP (ref 135–145)
TROPONIN I, HIGH SENSITIVITY RESULT: 38.8 NG/L — SIGNIFICANT CHANGE UP
WBC # BLD: 3.45 K/UL — LOW (ref 3.8–10.5)
WBC # FLD AUTO: 3.45 K/UL — LOW (ref 3.8–10.5)

## 2024-09-21 PROCEDURE — 93010 ELECTROCARDIOGRAM REPORT: CPT

## 2024-09-21 PROCEDURE — 99233 SBSQ HOSP IP/OBS HIGH 50: CPT | Mod: FS

## 2024-09-21 RX ORDER — REMDESIVIR 5 MG/ML
100 INJECTION INTRAVENOUS EVERY 24 HOURS
Refills: 0 | Status: DISCONTINUED | OUTPATIENT
Start: 2024-09-22 | End: 2024-09-23

## 2024-09-21 RX ADMIN — ENOXAPARIN SODIUM 40 MILLIGRAM(S): 150 INJECTION SUBCUTANEOUS at 00:03

## 2024-09-21 RX ADMIN — Medication 325 MILLIGRAM(S): at 11:29

## 2024-09-21 RX ADMIN — Medication 70 MILLILITER(S): at 20:02

## 2024-09-21 RX ADMIN — Medication 0.4 MILLIGRAM(S): at 21:19

## 2024-09-21 RX ADMIN — Medication 25 MILLIGRAM(S): at 05:02

## 2024-09-21 RX ADMIN — SERTRALINE HYDROCHLORIDE 100 MILLIGRAM(S): 100 TABLET, FILM COATED ORAL at 11:29

## 2024-09-21 RX ADMIN — REMDESIVIR 200 MILLIGRAM(S): 5 INJECTION INTRAVENOUS at 00:03

## 2024-09-21 RX ADMIN — Medication 75 MILLIGRAM(S): at 11:32

## 2024-09-21 NOTE — SWALLOW BEDSIDE ASSESSMENT ADULT - SLP GENERAL OBSERVATIONS
Patient received bedside alert in recliner. +airborne precautions maintained 2* IGLESIAID- tracy Vyas present and participatory. Reported poor baseline intake at home and at present. Patient alert, generally oriented. Severely reduced hearing acuity impacted patient's ability to participate. Patient followed simple directives at this time.

## 2024-09-21 NOTE — OCCUPATIONAL THERAPY INITIAL EVALUATION ADULT - PERTINENT HX OF CURRENT PROBLEM, REHAB EVAL
Pt is a 89 yo male pmhx. of CAD, CHF, BPH, GERD, aortic stenosis, prosthetic heart valve, dementia, HTN, myelodysplastic syndrome presenting for weakness and fever of 102 at home admitted for weakness 2/2 covid.

## 2024-09-21 NOTE — PROGRESS NOTE ADULT - ASSESSMENT
89 yo male pmhx. of CAD, CHF, BPH, GERD, aortic stenosis, prosthetic heart valve, dementia, HTN, myelodysplastic syndrome presenting for weakness and fever of 102 at home admitted for weakness 2/2 covid.    #Weakness  #Covid positive  -T101.2F, HR 76, /55, RR 18, SpO2 97% on RA in ED  -No leukocytosis, lactate 0.9  -Trop 31.7  -UA unremarkable for infection  -CXR no obvious consolidations or infiltrates  -s/p 2000cc NS in ED  -s/p cefepime in ED  -Gentle fluids  -Start Remdesivir  -F/u UCx, BCx  -Mg and Phos in AM  -Nutrition consult  -SLP eval  -PT/OT consult for potential placement, pt's 24/7 is sick and cannot help at this time  -Enhanced supervision - pt has 24/7 aid at home  -Fall precautions  -Tele    #HTN  -C/w metoprolol succinate 25 mg qd  -C/w valsartan 160 mg qd    #TAVR  -C/w Plavix 75 mg qd    #Myelodysplastic syndrome  #Anemia  -C/w iron supplement    #BPH  -C/w Flomax 0.4 mg qd    #Depression  -C/w sertraline 100 mg qhs    Diet - soft and bite sized  VTE PPX -  lovenox   Full code    Discussed with Dr. Felix         89 yo male pmhx. of CAD, CHF, BPH, GERD, aortic stenosis, prosthetic heart valve, dementia, HTN, myelodysplastic syndrome presenting for weakness and fever of 102 at home admitted for weakness 2/2 covid.    #Weakness  #Covid positive  # Possible hallucinations due to illness   -dementia with confusion with possible halucinations  if persist consider psych consult   -T101.2F, HR 76, /55, RR 18, SpO2 97% on RA in ED  - afebrile this am   -No leukocytosis, lactate 0.9  -Trop 31.7  -UA unremarkable for infection  -CXR no obvious consolidations or infiltrates  -s/p 2000cc NS in ED  -s/p cefepime in ED  -continue Gentle fluids  -continue  Remdesivir  will hold off on Decadron as pt is confused and O2 sats are stable   -UCx, BCx- pending   -Mg and Phos wnl this am   -Nutrition consult- pending   -SLP eval- pending   -PT/OT consult recommending [facility] at this point   -Enhanced supervision - pt has 24/7 aid at home  -Fall precautions    #HTN  -C/w metoprolol succinate 25 mg qd  -C/w valsartan 160 mg qd    #TAVR  -C/w Plavix 75 mg qd    #Myelodysplastic syndrome  #Anemia  -C/w iron supplement    #BPH  -C/w Flomax 0.4 mg qd    #Depression  -C/w sertraline 100 mg qhs    Diet - soft and bite sized  VTE PPX -  lovenox   Full code    Dr Carrero to update family members          89 yo male pmhx. of CAD, CHF, BPH, GERD, aortic stenosis, prosthetic heart valve, dementia, HTN, myelodysplastic syndrome presenting for weakness and fever of 102 at home admitted for weakness 2/2 covid.    #Weakness  #Covid positive  # Possible hallucinations due to illness   -dementia with confusion with possible halucinations  if persist consider psych consult   -T101.2F, HR 76, /55, RR 18, SpO2 97% on RA in ED  - afebrile this am   -No leukocytosis, lactate 0.9  -Trop 31.7  -UA unremarkable for infection  -CXR no obvious consolidations or infiltrates  -s/p 2000cc NS in ED  -s/p cefepime in ED  -continue Gentle fluids  -continue  Remdesivir  will hold off on Decadron as pt is confused and O2 sats are stable   -UCx, BCx- pending   - infectious disaese to see   -Mg and Phos wnl this am   -Nutrition consult- pending   -SLP eval- pending   -PT/OT consult recommending [facility] at this point   -Enhanced supervision - pt has 24/7 aid at home  -Fall precautions    #HTN  -C/w metoprolol succinate 25 mg qd  -C/w valsartan 160 mg qd    #TAVR  -C/w Plavix 75 mg qd    #Myelodysplastic syndrome  #Anemia  -C/w iron supplement    #BPH  -C/w Flomax 0.4 mg qd    #Depression  -C/w sertraline 100 mg qhs    Diet - soft and bite sized  VTE PPX -  lovenox   Full code      spoke to the patient  at length. unsure of the goals of care will speak to her family and get back to us  Family interested in [facility] for now

## 2024-09-21 NOTE — DIETITIAN INITIAL EVALUATION ADULT - OTHER INFO
Initial Nutrition Assessment   89yr Old Male   No Food Allergy/Intolerance - Per EMR  Tolerates Diet Well - No Chewing/Swallowing Complications (Per Patient Observation)  No Pressure Ulcers (as Per Nursing Flow Sheets)  No Edema Noted (as Per Nursing Flow Sheets)

## 2024-09-21 NOTE — OCCUPATIONAL THERAPY INITIAL EVALUATION ADULT - BALANCE TRAINING, PT EVAL
Pt will demonstrate improved static/dynamic standing balance to fair in order to increase safety and independence in ADLs within 4 weeks

## 2024-09-21 NOTE — SWALLOW BEDSIDE ASSESSMENT ADULT - SWALLOW EVAL: DIAGNOSIS
Patient presenting with WFL oropharyngeal swallow function for age and baseline diet. Upon entry to room patient had just taken bite of soft and bite size chicken, self fed with hand over hand assist 1 sip of water. Patient declined all additional intake. Presented with adequate acceptance and anterior oral containment with timely mastication and complete bolus breakdown. Suspect WFL AP transport. Presented without overt s/s penetration/aspiration. Oral cavity clear following intake. Recommend continue soft and bite size solids with thin liquids at this time with assist with intake. Son in agreement with continuation of diet texture.

## 2024-09-21 NOTE — DIETITIAN INITIAL EVALUATION ADULT - NS FNS DIET ORDER
Soft & Bite Sized (IDDSI Level 6/Soft/Dysphagia 3) Diet w/ Thin Liquids (IDDSI Level 0)   Recommend Initiate Ensure Plus High Protein 8oz PO BID

## 2024-09-21 NOTE — OCCUPATIONAL THERAPY INITIAL EVALUATION ADULT - RANGE OF MOTION EXAMINATION
left UE WFL, right UE WFL except for shoulder (90 degrees of extension only, limited by chronic injury 2/2 fall)/deficits as listed below

## 2024-09-21 NOTE — OCCUPATIONAL THERAPY INITIAL EVALUATION ADULT - MD ORDER
MD orders received. Chart reviewed, contents noted, conferred with RN. Pt tolerated OT evaluation, see initial evaluation for findings.

## 2024-09-21 NOTE — DIETITIAN INITIAL EVALUATION ADULT - PERTINENT MEDS FT
MEDICATIONS  (STANDING):  clopidogrel Tablet 75 milliGRAM(s) Oral daily  enoxaparin Injectable 40 milliGRAM(s) SubCutaneous every 24 hours  ferrous    sulfate 325 milliGRAM(s) Oral daily  metoprolol succinate ER 25 milliGRAM(s) Oral daily  remdesivir  IVPB   IV Intermittent   sertraline 100 milliGRAM(s) Oral daily  sodium chloride 0.9%. 1000 milliLiter(s) (70 mL/Hr) IV Continuous <Continuous>  tamsulosin 0.4 milliGRAM(s) Oral at bedtime  valsartan 160 milliGRAM(s) Oral daily    MEDICATIONS  (PRN):  acetaminophen     Tablet .. 650 milliGRAM(s) Oral every 6 hours PRN Temp greater or equal to 38C (100.4F), Mild Pain (1 - 3)  aluminum hydroxide/magnesium hydroxide/simethicone Suspension 30 milliLiter(s) Oral every 4 hours PRN Dyspepsia  melatonin 3 milliGRAM(s) Oral at bedtime PRN Insomnia

## 2024-09-21 NOTE — DIETITIAN INITIAL EVALUATION ADULT - PERTINENT LABORATORY DATA
09-21    141  |  107  |  20  ----------------------------<  87  3.9   |  24  |  0.93    Ca    8.0[L]      21 Sep 2024 07:48  Phos  3.6     09-21  Mg     1.8     09-21    TPro  5.9[L]  /  Alb  3.1[L]  /  TBili  1.4[H]  /  DBili  x   /  AST  20  /  ALT  15  /  AlkPhos  187[H]  09-21  A1C with Estimated Average Glucose Result: 5.0 % (06-14-24 @ 05:36)

## 2024-09-21 NOTE — OCCUPATIONAL THERAPY INITIAL EVALUATION ADULT - NSACTIVITYREC_GEN_A_OT
Recommend inpatient rehab, if d/c home would require 24/7 aide, hospital bed, 3:1 commode. Recommend jorge espinal for preparatory transfers and commode transfers during current hospital course.

## 2024-09-21 NOTE — OCCUPATIONAL THERAPY INITIAL EVALUATION ADULT - GENERAL OBSERVATIONS, REHAB EVAL
Pt received in semi-melvin position in bed +NAD +VSS +no c/o pain +all needs in reach +A&Ox4, +right PIV, agreeable to OT. Pt left seated in bedside recliner +elevated leg rests +all lines intact +NAD +VSS +no c/o pain +all needs in reach +handoff to RN

## 2024-09-21 NOTE — PHYSICAL THERAPY INITIAL EVALUATION ADULT - PERTINENT HX OF CURRENT PROBLEM, REHAB EVAL
89 yo male pmhx. of CAD, CHF, BPH, GERD, aortic stenosis s/p TAVR, dementia, HTN, myelodysplastic syndrome presenting for weakness and fever of 102 at home. Pt unable to provide history. Daughter at bedside but also unable to provide history since pt is usually with aid who is also sick. Based on chart review, pt was exposed to home aid who was positive for COVID and pt has also not been eating or drinking for the past 2 weeks, PCP concerned about failure to thrive. Obtained collateral from pt's emergency contact son Ilya. Pt is with an aid 24/7. Recently had to hire a second aid because pt started getting up in the middle of the night to urinate and is a fall risk, and aid was not getting enough rest. As far as he knows, pt does not have history of stroke, MI, or DVT/PE.

## 2024-09-21 NOTE — DIETITIAN INITIAL EVALUATION ADULT - ADD RECOMMEND
1) Monitor Weights, Intake, Tolerance, Skin & Labwork  2) Ensure Plus High Protein 8oz PO BID  3) Continue Nutrition Plan of Care

## 2024-09-21 NOTE — OCCUPATIONAL THERAPY INITIAL EVALUATION ADULT - TRANSFER TRAINING, PT EVAL
Pt will complete bathroom transfers (shower, toilet) with minimal assist and mobility devices/DME as needed in 4 weeks

## 2024-09-21 NOTE — SWALLOW BEDSIDE ASSESSMENT ADULT - ORAL PREPARATORY PHASE
Repeat EKG completed as per MD order. Pt found to be in AFIB. Dr. Avendano aware. No further orders at this time. Will continue to monitor HR and BP. Pt offers no complaints at this time. VS as noted. In NAD. Within functional limits

## 2024-09-21 NOTE — OCCUPATIONAL THERAPY INITIAL EVALUATION ADULT - ADDITIONAL COMMENTS
Pt lives in a private home, poor historian but reports 1 step entry to 1st floor set-up, shower stall with curtain +detachable shower head + grab bars. Pt requires assistance in all mobility-related ADLs/iADLs PTA.

## 2024-09-21 NOTE — SWALLOW BEDSIDE ASSESSMENT ADULT - COMMENTS
WBC 9/21 3.45  CXR 9/20 IMPRESSION: No acute cardiopulmonary disease process.    Status discussed with son, Ilya via phone call prior to evaluation and sonAsael, at bedside for evaluation. Both reported poor baseline intake at home and at present.

## 2024-09-22 LAB
ALBUMIN SERPL ELPH-MCNC: 2.5 G/DL — LOW (ref 3.3–5)
ALP SERPL-CCNC: 144 U/L — HIGH (ref 40–120)
ALT FLD-CCNC: 10 U/L — SIGNIFICANT CHANGE UP (ref 10–45)
ANION GAP SERPL CALC-SCNC: 14 MMOL/L — SIGNIFICANT CHANGE UP (ref 5–17)
AST SERPL-CCNC: 22 U/L — SIGNIFICANT CHANGE UP (ref 10–40)
BILIRUB DIRECT SERPL-MCNC: 0.3 MG/DL — SIGNIFICANT CHANGE UP (ref 0–0.3)
BILIRUB INDIRECT FLD-MCNC: 0.7 MG/DL — SIGNIFICANT CHANGE UP (ref 0.2–1)
BILIRUB SERPL-MCNC: 1 MG/DL — SIGNIFICANT CHANGE UP (ref 0.2–1.2)
BUN SERPL-MCNC: 21 MG/DL — SIGNIFICANT CHANGE UP (ref 7–23)
CALCIUM SERPL-MCNC: 7.8 MG/DL — LOW (ref 8.4–10.5)
CHLORIDE SERPL-SCNC: 109 MMOL/L — HIGH (ref 96–108)
CO2 SERPL-SCNC: 19 MMOL/L — LOW (ref 22–31)
CREAT SERPL-MCNC: 0.7 MG/DL — SIGNIFICANT CHANGE UP (ref 0.5–1.3)
CREAT SERPL-MCNC: 0.82 MG/DL — SIGNIFICANT CHANGE UP (ref 0.5–1.3)
CULTURE RESULTS: SIGNIFICANT CHANGE UP
EGFR: 80 ML/MIN/1.73M2 — SIGNIFICANT CHANGE UP
EGFR: 83 ML/MIN/1.73M2 — SIGNIFICANT CHANGE UP
GLUCOSE SERPL-MCNC: 75 MG/DL — SIGNIFICANT CHANGE UP (ref 70–99)
HCT VFR BLD CALC: 26.3 % — LOW (ref 39–50)
HGB BLD-MCNC: 8.6 G/DL — LOW (ref 13–17)
INR BLD: 1.72 RATIO — HIGH (ref 0.85–1.18)
MCHC RBC-ENTMCNC: 32.7 GM/DL — SIGNIFICANT CHANGE UP (ref 32–36)
MCHC RBC-ENTMCNC: 35.4 PG — HIGH (ref 27–34)
MCV RBC AUTO: 108.2 FL — HIGH (ref 80–100)
NRBC # BLD: 0 /100 WBCS — SIGNIFICANT CHANGE UP (ref 0–0)
PLATELET # BLD AUTO: 136 K/UL — LOW (ref 150–400)
POTASSIUM SERPL-MCNC: 3.5 MMOL/L — SIGNIFICANT CHANGE UP (ref 3.5–5.3)
POTASSIUM SERPL-SCNC: 3.5 MMOL/L — SIGNIFICANT CHANGE UP (ref 3.5–5.3)
PROT SERPL-MCNC: 5.1 G/DL — LOW (ref 6–8.3)
PROTHROM AB SERPL-ACNC: 19.3 SEC — HIGH (ref 9.5–13)
RBC # BLD: 2.43 M/UL — LOW (ref 4.2–5.8)
RBC # FLD: 20.3 % — HIGH (ref 10.3–14.5)
SODIUM SERPL-SCNC: 142 MMOL/L — SIGNIFICANT CHANGE UP (ref 135–145)
SPECIMEN SOURCE: SIGNIFICANT CHANGE UP
WBC # BLD: 3.11 K/UL — LOW (ref 3.8–10.5)
WBC # FLD AUTO: 3.11 K/UL — LOW (ref 3.8–10.5)

## 2024-09-22 PROCEDURE — 99233 SBSQ HOSP IP/OBS HIGH 50: CPT | Mod: FS

## 2024-09-22 PROCEDURE — 71250 CT THORAX DX C-: CPT | Mod: 26

## 2024-09-22 PROCEDURE — 70450 CT HEAD/BRAIN W/O DYE: CPT | Mod: 26

## 2024-09-22 RX ADMIN — Medication 25 MILLIGRAM(S): at 06:07

## 2024-09-22 RX ADMIN — VALSARTAN 160 MILLIGRAM(S): 320 TABLET ORAL at 06:07

## 2024-09-22 RX ADMIN — ENOXAPARIN SODIUM 40 MILLIGRAM(S): 150 INJECTION SUBCUTANEOUS at 23:41

## 2024-09-22 RX ADMIN — Medication 325 MILLIGRAM(S): at 13:25

## 2024-09-22 RX ADMIN — Medication 75 MILLIGRAM(S): at 13:24

## 2024-09-22 RX ADMIN — SERTRALINE HYDROCHLORIDE 100 MILLIGRAM(S): 100 TABLET, FILM COATED ORAL at 13:25

## 2024-09-22 RX ADMIN — REMDESIVIR 200 MILLIGRAM(S): 5 INJECTION INTRAVENOUS at 23:41

## 2024-09-22 RX ADMIN — ENOXAPARIN SODIUM 40 MILLIGRAM(S): 150 INJECTION SUBCUTANEOUS at 00:05

## 2024-09-22 RX ADMIN — Medication 0.4 MILLIGRAM(S): at 21:53

## 2024-09-22 RX ADMIN — REMDESIVIR 200 MILLIGRAM(S): 5 INJECTION INTRAVENOUS at 00:05

## 2024-09-22 RX ADMIN — Medication 3 MILLIGRAM(S): at 00:05

## 2024-09-22 NOTE — PROGRESS NOTE ADULT - ASSESSMENT
89yo Male w. PMH CAD, Chronic CHF (type unkn), BPH, GERD, aortic stenosis, prosthetic heart valve, dementia, HTN, myelodysplastic syndrome presenting for weakness and fever of 102 at home admitted for weakness 2/2 COVID-19.     *Weakness  *COVID-19+   *Possible hallucinations due to illness   *Pancytopenia likely in s/o Covid   - s/p Dementia vs. Delium, acute metabolic encephelopathy in s/o infection   - No prior formal Dementia Dx   - T101.2F, HR 76, /55, RR 18, SpO2 97% on RA in ED  - No leukocytosis, lactate 0.9  - s/p 2000cc NS in ED  - UA/UCx Neg   - f/up BldCxs  - s/p cefepime in ED  - Continue Remdesivir  will hold off on Decadron as pt is confused and O2 sats are stable   - UCx Neg   - ID Consulted   - SLP recs appreciated   - Nutrition consulted  - PT/OT consult recommending [facility]  - Enhanced supervision - pt has 24/7 aid at home  - Fall precautions  - CT Head pending  - CT Chest pending    *HTN  -Cont Metoprolol succinate 25 mg qd  -Cont Valsartan 160 mg qd    *TAVR  -Cont Plavix 75 mg qd    *Myelodysplastic Syndrome  *Anemia  *Pancytopenia- likely in s/o infection as above   - Cont Iron supplement  - Trend CBC    *BPH  -Cont Flomax 0.4 mg qd    *Depression  -Cont Sertraline 100 mg qhs    *DVT ppx  Lovenox    Dispo: Pending clinical course as above. Plan for d/c to [facility] when medically cleared.     Will update Family.          89yo Male w. PMH CAD, Chronic CHF (type unkn), BPH, GERD, aortic stenosis, prosthetic heart valve, dementia, HTN, myelodysplastic syndrome presenting for weakness and fever of 102 at home admitted for weakness 2/2 COVID-19.     *Weakness  *COVID-19+   *Possible hallucinations due to illness   *Pancytopenia likely in s/o Covid   - s/p Dementia vs. Delium, acute metabolic encephelopathy in s/o infection   - No prior formal Dementia Dx   - T101.2F, HR 76, /55, RR 18, SpO2 97% on RA in ED  - No leukocytosis, lactate 0.9  - s/p 2000cc NS in ED  - UA/UCx Neg   - f/up BldCxs  - s/p cefepime in ED  - Continue Remdesivir  will hold off on Decadron as pt is confused and O2 sats are stable   - UCx Neg   - ID Consulted   - SLP recs appreciated   - Nutrition consulted  - PT/OT consult recommending [facility]  - Enhanced supervision - pt has 24/7 aid at home  - Fall precautions  - CT Head pending  - CT Chest pending    *HTN  -Cont Metoprolol succinate 25 mg qd  -Cont Valsartan 160 mg qd    *TAVR  -Cont Plavix 75 mg qd    *Myelodysplastic Syndrome  *Anemia  *Pancytopenia- likely in s/o infection as above   - Cont Iron supplement  - Trend CBC    *BPH  -Cont Flomax 0.4 mg qd    *Depression  -Cont Sertraline 100 mg qhs    *DVT ppx  Lovenox    Dispo: Pending clinical course as above. Plan for d/c to [facility] when medically cleared.       spoke with the son Ilya at length today 09/22, spoke to the daughter yesterday 09/21- all questions answered

## 2024-09-23 ENCOUNTER — TRANSCRIPTION ENCOUNTER (OUTPATIENT)
Age: 89
End: 2024-09-23

## 2024-09-23 LAB
ALBUMIN SERPL ELPH-MCNC: 2.7 G/DL — LOW (ref 3.3–5)
ALBUMIN SERPL ELPH-MCNC: 2.8 G/DL — LOW (ref 3.3–5)
ALP SERPL-CCNC: 145 U/L — HIGH (ref 40–120)
ALP SERPL-CCNC: 154 U/L — HIGH (ref 40–120)
ALT FLD-CCNC: 16 U/L — SIGNIFICANT CHANGE UP (ref 10–45)
ALT FLD-CCNC: 20 U/L — SIGNIFICANT CHANGE UP (ref 10–45)
ANION GAP SERPL CALC-SCNC: 17 MMOL/L — SIGNIFICANT CHANGE UP (ref 5–17)
AST SERPL-CCNC: 35 U/L — SIGNIFICANT CHANGE UP (ref 10–40)
AST SERPL-CCNC: 36 U/L — SIGNIFICANT CHANGE UP (ref 10–40)
BILIRUB DIRECT SERPL-MCNC: 0.3 MG/DL — SIGNIFICANT CHANGE UP (ref 0–0.3)
BILIRUB INDIRECT FLD-MCNC: 0.7 MG/DL — SIGNIFICANT CHANGE UP (ref 0.2–1)
BILIRUB SERPL-MCNC: 1 MG/DL — SIGNIFICANT CHANGE UP (ref 0.2–1.2)
BILIRUB SERPL-MCNC: 1 MG/DL — SIGNIFICANT CHANGE UP (ref 0.2–1.2)
BUN SERPL-MCNC: 23 MG/DL — SIGNIFICANT CHANGE UP (ref 7–23)
CALCIUM SERPL-MCNC: 8.1 MG/DL — LOW (ref 8.4–10.5)
CHLORIDE SERPL-SCNC: 112 MMOL/L — HIGH (ref 96–108)
CO2 SERPL-SCNC: 17 MMOL/L — LOW (ref 22–31)
CREAT SERPL-MCNC: 0.82 MG/DL — SIGNIFICANT CHANGE UP (ref 0.5–1.3)
CREAT SERPL-MCNC: 0.82 MG/DL — SIGNIFICANT CHANGE UP (ref 0.5–1.3)
EGFR: 79 ML/MIN/1.73M2 — SIGNIFICANT CHANGE UP
EGFR: 80 ML/MIN/1.73M2 — SIGNIFICANT CHANGE UP
GLUCOSE SERPL-MCNC: 65 MG/DL — LOW (ref 70–99)
HCT VFR BLD CALC: 27.3 % — LOW (ref 39–50)
HGB BLD-MCNC: 8.9 G/DL — LOW (ref 13–17)
INR BLD: 1.66 RATIO — HIGH (ref 0.85–1.18)
MCHC RBC-ENTMCNC: 32.6 GM/DL — SIGNIFICANT CHANGE UP (ref 32–36)
MCHC RBC-ENTMCNC: 35 PG — HIGH (ref 27–34)
MCV RBC AUTO: 107.5 FL — HIGH (ref 80–100)
NRBC # BLD: 0 /100 WBCS — SIGNIFICANT CHANGE UP (ref 0–0)
PLATELET # BLD AUTO: 139 K/UL — LOW (ref 150–400)
POTASSIUM SERPL-MCNC: 3.9 MMOL/L — SIGNIFICANT CHANGE UP (ref 3.5–5.3)
POTASSIUM SERPL-SCNC: 3.9 MMOL/L — SIGNIFICANT CHANGE UP (ref 3.5–5.3)
PROT SERPL-MCNC: 5.2 G/DL — LOW (ref 6–8.3)
PROT SERPL-MCNC: 5.3 G/DL — LOW (ref 6–8.3)
PROTHROM AB SERPL-ACNC: 19.1 SEC — HIGH (ref 9.5–13)
RBC # BLD: 2.54 M/UL — LOW (ref 4.2–5.8)
RBC # FLD: 20.7 % — HIGH (ref 10.3–14.5)
SODIUM SERPL-SCNC: 146 MMOL/L — HIGH (ref 135–145)
WBC # BLD: 3.78 K/UL — LOW (ref 3.8–10.5)
WBC # FLD AUTO: 3.78 K/UL — LOW (ref 3.8–10.5)

## 2024-09-23 PROCEDURE — 99233 SBSQ HOSP IP/OBS HIGH 50: CPT | Mod: FS

## 2024-09-23 RX ADMIN — SERTRALINE HYDROCHLORIDE 100 MILLIGRAM(S): 100 TABLET, FILM COATED ORAL at 11:45

## 2024-09-23 RX ADMIN — Medication 0.4 MILLIGRAM(S): at 22:19

## 2024-09-23 RX ADMIN — Medication 325 MILLIGRAM(S): at 11:45

## 2024-09-23 RX ADMIN — Medication 75 MILLIGRAM(S): at 11:45

## 2024-09-23 NOTE — DISCHARGE NOTE PROVIDER - CARE PROVIDER_API CALL
Chad Alcaraz  Internal Medicine  [address] [place] [address]  Phone: [phone]  Fax: [phone]  Established Patient  Follow Up Time:

## 2024-09-23 NOTE — DISCHARGE NOTE PROVIDER - NSDCCPCAREPLAN_GEN_ALL_CORE_FT
PRINCIPAL DISCHARGE DIAGNOSIS  Diagnosis: Adult failure to thrive  Assessment and Plan of Treatment: You were admitted with weakness in the setting of COVID. You were treated with IVF. You were seen by PT, OT, and SLP. Please continue to follow up with outpatient provider.      SECONDARY DISCHARGE DIAGNOSES  Diagnosis: COVID-19 viremia  Assessment and Plan of Treatment: You have COVID, you were treated with 5 days of Remdesivir. You were treated with oral decadron.  Please continue to follow up with your outpatient provider.

## 2024-09-23 NOTE — DISCHARGE NOTE PROVIDER - HOSPITAL COURSE
Hospital Course  HPI:  89 yo male pmhx. of CAD, CHF, BPH, GERD, aortic stenosis s/p TAVR, dementia, HTN, myelodysplastic syndrome presenting for weakness and fever of 102 at home. Pt unable to provide history. Daughter at bedside but also unable to provide history since pt is usually with aid who is also sick. Based on chart review, pt was exposed to home aid who was positive for COVID and pt has also not been eating or drinking for the past 2 weeks, PCP concerned about failure to thrive. Obtained collateral from pt's emergency contact son Ilya. Pt is with an aid 24/7. Recently had to hire a second aid because pt started getting up in the middle of the night to urinate and is a fall risk, and aid was not getting enough rest. As far as he knows, pt does not have history of stroke, MI, or DVT/PE.      ED course  -T101.2F, HR 76, /55, RR 18, SpO2 97% on RA on arrival  -Covid positive  -UA unremarkable for infection  -CXR no obvious consolidations or infiltrates  -s/p 2000cc NS  -Started on cefepime (20 Sep 2024 20:27)      You were admitted for   You were diagnosed with   You were treated with   You were prescribed the following new medications:    You will need to follow up with your primary care physician.    Source of Infection:  Antibiotic / Last Day:    Palliative Care / Advanced Care Planning  Code Status:  Patient/Family agreeable to Hospice/Palliative (Y/N)?  Summary of Goals of Care Conversation:    Discharging Provider:  Guillaume Wu NP  Contact Info: Cell [phone] - Please call with any questions or concerns.    Outpatient Provider:    Signout given to  SNF Provider:   Hospital Course    89 yo male pmhx. of CAD, CHF, BPH, GERD, aortic stenosis s/p TAVR, dementia, HTN, myelodysplastic syndrome presenting for weakness and fever of 102 at home. Pt was exposed to home aid who was positive for COVID and pt has also not been eating or drinking for the past 2 weeks, PCP concerned about failure to thrive. Patient was admitted for weakness, found to have COVID. Patient had acute metabolic encephelopathy in s/o infection. Patient had CXR that was clear, CT chest tracheal bronchial thickening with tracheal and bronchial wall thickening. There are small bilateral pleural effusions. CT head was negative. Patient blood culture was NGTD. Patient was treated with Remdesivir for 5 days, oral decadron daily. Patient was treated with IVF for hydration in setting of failure to thrive. SLP saw patient, recommended soft and bite sized diet. PT/OT saw patient, recommended [facility]. Patient is hemodynamically stable for discharge home.       You will need to follow up with your primary care physician.      Discharging Provider:  Guillaume Wu NP  Contact Info: Cell [phone] - Please call with any questions or concerns.    Outpatient Provider: Dr. Garcia    Hospital Course    89 yo male pmhx. of CAD, CHF, BPH, GERD, aortic stenosis s/p TAVR, dementia, HTN, myelodysplastic syndrome presenting for weakness and fever of 102 at home. Pt was exposed to home aid who was positive for COVID and pt has also not been eating or drinking for the past 2 weeks, PCP concerned about failure to thrive. Patient was admitted for weakness, found to have COVID. Patient had acute metabolic encephelopathy in s/o infection. Patient had CXR that was clear, CT chest tracheal bronchial thickening with tracheal and bronchial wall thickening. There are small bilateral pleural effusions. CT head was negative. Patient blood culture was NGTD. Patient was treated with Remdesivir for 5 days, oral decadron daily. Both now discontinued. Patient was treated with IVF for hydration in setting of failure to thrive. SLP saw patient, recommended soft and bite sized diet. PT/OT saw patient, recommended [facility]. Patient is hemodynamically stable for discharge to subacute rehab.        You will need to follow up with your primary care physician.      Discharging Provider:  Guillaume Wu NP  Contact Info: Cell [phone] - Please call with any questions or concerns.    Outpatient Provider: Dr. Jose Alcaraz at [facility] - notified

## 2024-09-23 NOTE — DISCHARGE NOTE PROVIDER - NSDCMRMEDTOKEN_GEN_ALL_CORE_FT
clopidogrel 75 mg oral tablet: 1 tab(s) orally once a day  ferrous sulfate: 45 milligram(s) orally once a day  metoprolol succinate 25 mg oral tablet, extended release: 1 tab(s) orally once a day  sertraline 100 mg oral tablet: 1 tab(s) orally once a day (at bedtime)  tamsulosin 0.4 mg oral capsule: 1 cap(s) orally once a day (in the morning)  valsartan 160 mg oral capsule: 1 tab(s) orally once a day   clopidogrel 75 mg oral tablet: 1 tab(s) orally once a day  ferrous sulfate: 45 milligram(s) orally once a day  metoprolol succinate 25 mg oral tablet, extended release: 1 tab(s) orally once a day  sertraline 100 mg oral tablet: 1 tab(s) orally once a day (at bedtime)  tamsulosin 0.4 mg oral capsule: 1 cap(s) orally once a day (at bedtime)  valsartan 160 mg oral capsule: 1 tab(s) orally once a day

## 2024-09-23 NOTE — CONSULT NOTE ADULT - SUBJECTIVE AND OBJECTIVE BOX
HPI:   Patient is a 89y male with dementia, htn, mds admitted 3 d ago for fever, FTT, tested positive for covid. He has not been hypoxic. He is no longer with fever. He cannot give a coherent history    REVIEW OF SYSTEMS:  All other review of systems negative (Comprehensive ROS)    PAST MEDICAL & SURGICAL HISTORY:  Hypertension      Anemia      Alzheimer disease      Myelodysplastic syndrome      H/O Billroth II operation      History of bone marrow biopsy          Allergies    No Known Allergies    Intolerances        Antimicrobials Day #  :4  remdesivir  IVPB   IV Intermittent   remdesivir  IVPB 100 milliGRAM(s) IV Intermittent every 24 hours    Other Medications:  acetaminophen     Tablet .. 650 milliGRAM(s) Oral every 6 hours PRN  aluminum hydroxide/magnesium hydroxide/simethicone Suspension 30 milliLiter(s) Oral every 4 hours PRN  clopidogrel Tablet 75 milliGRAM(s) Oral daily  dexAMETHasone     Tablet 6 milliGRAM(s) Oral daily  enoxaparin Injectable 40 milliGRAM(s) SubCutaneous every 24 hours  ferrous    sulfate 325 milliGRAM(s) Oral daily  melatonin 3 milliGRAM(s) Oral at bedtime PRN  metoprolol succinate ER 25 milliGRAM(s) Oral daily  sertraline 100 milliGRAM(s) Oral daily  tamsulosin 0.4 milliGRAM(s) Oral at bedtime  valsartan 160 milliGRAM(s) Oral daily      FAMILY HISTORY:      SOCIAL HISTORY:  Smoking: [ ]Yes [ ]No  ETOH: [ ]Yes [ ]No  Drug Use: [ ]Yes [ ]No   [ ] Single[ ]    T(F): 97.8 (09-23-24 @ 11:58), Max: 98 (09-22-24 @ 20:35)  HR: 73 (09-23-24 @ 11:58)  BP: 143/58 (09-23-24 @ 11:58)  RR: 17 (09-23-24 @ 11:58)  SpO2: 98% (09-23-24 @ 11:58)  Wt(kg): --    PHYSICAL EXAM:  General: alert, no acute distress  Eyes:  anicteric, no conjunctival injection, no discharge  Oropharynx: no lesions or injection 	  Neck: supple, without adenopathy  Lungs: right basilar rales  to auscultation  Heart: regular rate and rhythm; no murmur, rubs or gallops  Abdomen: soft, nondistended, nontender, without mass or organomegaly  Skin: no lesions  Extremities: no clubbing, cyanosis, or edema  Neurologic: alert, confused  moves all extremities    LAB RESULTS:                        8.9    3.78  )-----------( 139      ( 23 Sep 2024 07:15 )             27.3     09-23    146[H]  |  112[H]  |  23  ----------------------------<  65[L]  3.9   |  17[L]  |  0.82    Ca    8.1[L]      23 Sep 2024 07:15    TPro  5.2[L]  /  Alb  2.7[L]  /  TBili  1.0  /  DBili  0.3  /  AST  35  /  ALT  16  /  AlkPhos  145[H]  09-23    LIVER FUNCTIONS - ( 23 Sep 2024 07:15 )  Alb: 2.7 g/dL / Pro: 5.2 g/dL / ALK PHOS: 145 U/L / ALT: 16 U/L / AST: 35 U/L / GGT: x           Urinalysis Basic - ( 23 Sep 2024 07:15 )    Color: x / Appearance: x / SG: x / pH: x  Gluc: 65 mg/dL / Ketone: x  / Bili: x / Urobili: x   Blood: x / Protein: x / Nitrite: x   Leuk Esterase: x / RBC: x / WBC x   Sq Epi: x / Non Sq Epi: x / Bacteria: x        MICROBIOLOGY:  RECENT CULTURES:  09-20 @ 18:42 Clean Catch Clean Catch (Midstream)     <10,000 CFU/mL Normal Urogenital Shyanne      09-20 @ 16:58 .Blood Blood-Peripheral     No growth at 48 Hours      09-20 @ 16:56 .Blood Blood-Peripheral     No growth at 48 Hours            RADIOLOGY REVIEWED:    < from: CT Chest No Cont (09.22.24 @ 12:55) >  ACC: [id number] EXAM:  CT CHEST   ORDERED BY:  DEV WEI     PROCEDURE DATE:  09/22/2024          INTERPRETATION:  EXAMINATION: CT CHEST    CLINICAL INDICATION: Dyspnea.    TECHNIQUE: Noncontrast CT of the chest was obtained.    COMPARISON: 6/12/2024.    FINDINGS:    AIRWAYS AND LUNGS: Tracheal bronchial thickening with tracheal and   bronchial wall thickening.  The lungs are clear.    MEDIASTINUM AND PLEURA: There are no enlarged mediastinal, hilar or   axillary lymph nodes. The visualized portion of the thyroid gland is   unremarkable. There are small bilateral pleural effusions.  There is no   pneumothorax.    HEART AND VESSELS: There is mild cardiomegaly.  There are atherosclerotic   calcifications of the aorta and coronary arteries.There is no   pericardial effusion.  TAVR    UPPER ABDOMEN: Images of the upper abdomen demonstrate cholecystectomy.   Hepatic cysts.    BONES AND SOFT TISSUES: Subacute bilateral rib fractures. Demonstrated   lower thoracic/lumbar vertebral body fractures. The soft tissues are   unremarkable.      IMPRESSION:  Tracheal bronchial thickening with tracheal and bronchial wall thickening.    There are small bilateral pleural effusions.    --- End of Report ---      < end of copied text >        Impression:Elderly man with dementia, mds, came in 3 d ago for fever, FTT, positive for covid. No hypoxia, no pneumonia so no indication for steroids or abx.       Recommendations:  can limit rdv to today since not hypoxic  no ID contraindication to discharge
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
## 2024-09-24 ENCOUNTER — TRANSCRIPTION ENCOUNTER (OUTPATIENT)
Age: 89
End: 2024-09-24

## 2024-09-24 LAB
ALBUMIN SERPL ELPH-MCNC: 2.8 G/DL — LOW (ref 3.3–5)
ALP SERPL-CCNC: 147 U/L — HIGH (ref 40–120)
ALT FLD-CCNC: 15 U/L — SIGNIFICANT CHANGE UP (ref 10–45)
ANION GAP SERPL CALC-SCNC: 12 MMOL/L — SIGNIFICANT CHANGE UP (ref 5–17)
AST SERPL-CCNC: 32 U/L — SIGNIFICANT CHANGE UP (ref 10–40)
BILIRUB DIRECT SERPL-MCNC: 0.2 MG/DL — SIGNIFICANT CHANGE UP (ref 0–0.3)
BILIRUB INDIRECT FLD-MCNC: 1 MG/DL — SIGNIFICANT CHANGE UP (ref 0.2–1)
BILIRUB SERPL-MCNC: 1.2 MG/DL — SIGNIFICANT CHANGE UP (ref 0.2–1.2)
BUN SERPL-MCNC: 19 MG/DL — SIGNIFICANT CHANGE UP (ref 7–23)
CALCIUM SERPL-MCNC: 8.1 MG/DL — LOW (ref 8.4–10.5)
CHLORIDE SERPL-SCNC: 112 MMOL/L — HIGH (ref 96–108)
CO2 SERPL-SCNC: 20 MMOL/L — LOW (ref 22–31)
CREAT SERPL-MCNC: 0.76 MG/DL — SIGNIFICANT CHANGE UP (ref 0.5–1.3)
CREAT SERPL-MCNC: 0.79 MG/DL — SIGNIFICANT CHANGE UP (ref 0.5–1.3)
EGFR: 80 ML/MIN/1.73M2 — SIGNIFICANT CHANGE UP
EGFR: 81 ML/MIN/1.73M2 — SIGNIFICANT CHANGE UP
GLUCOSE SERPL-MCNC: 70 MG/DL — SIGNIFICANT CHANGE UP (ref 70–99)
HCT VFR BLD CALC: 28.7 % — LOW (ref 39–50)
HGB BLD-MCNC: 9.2 G/DL — LOW (ref 13–17)
INR BLD: 1.57 RATIO — HIGH (ref 0.85–1.18)
MCHC RBC-ENTMCNC: 32.1 GM/DL — SIGNIFICANT CHANGE UP (ref 32–36)
MCHC RBC-ENTMCNC: 34.7 PG — HIGH (ref 27–34)
MCV RBC AUTO: 108.3 FL — HIGH (ref 80–100)
NRBC # BLD: 0 /100 WBCS — SIGNIFICANT CHANGE UP (ref 0–0)
PLATELET # BLD AUTO: 127 K/UL — LOW (ref 150–400)
POTASSIUM SERPL-MCNC: 3.5 MMOL/L — SIGNIFICANT CHANGE UP (ref 3.5–5.3)
POTASSIUM SERPL-SCNC: 3.5 MMOL/L — SIGNIFICANT CHANGE UP (ref 3.5–5.3)
PROT SERPL-MCNC: 5.4 G/DL — LOW (ref 6–8.3)
PROTHROM AB SERPL-ACNC: 17.7 SEC — HIGH (ref 9.5–13)
RBC # BLD: 2.65 M/UL — LOW (ref 4.2–5.8)
RBC # FLD: 20.9 % — HIGH (ref 10.3–14.5)
SODIUM SERPL-SCNC: 144 MMOL/L — SIGNIFICANT CHANGE UP (ref 135–145)
WBC # BLD: 3.81 K/UL — SIGNIFICANT CHANGE UP (ref 3.8–10.5)
WBC # FLD AUTO: 3.81 K/UL — SIGNIFICANT CHANGE UP (ref 3.8–10.5)

## 2024-09-24 PROCEDURE — 99232 SBSQ HOSP IP/OBS MODERATE 35: CPT | Mod: FS,59

## 2024-09-24 PROCEDURE — 99239 HOSP IP/OBS DSCHRG MGMT >30: CPT

## 2024-09-24 RX ORDER — VALSARTAN 320 MG/1
320 TABLET ORAL DAILY
Refills: 0 | Status: DISCONTINUED | OUTPATIENT
Start: 2024-09-25 | End: 2024-09-25

## 2024-09-24 RX ORDER — TAMSULOSIN HCL 0.4 MG
1 CAPSULE ORAL
Refills: 0 | DISCHARGE

## 2024-09-24 RX ORDER — TAMSULOSIN HCL 0.4 MG
1 CAPSULE ORAL
Qty: 0 | Refills: 0 | DISCHARGE
Start: 2024-09-24

## 2024-09-24 RX ADMIN — Medication 6 MILLIGRAM(S): at 07:03

## 2024-09-24 RX ADMIN — Medication 25 MILLIGRAM(S): at 07:02

## 2024-09-24 RX ADMIN — ENOXAPARIN SODIUM 40 MILLIGRAM(S): 150 INJECTION SUBCUTANEOUS at 23:26

## 2024-09-24 RX ADMIN — ENOXAPARIN SODIUM 40 MILLIGRAM(S): 150 INJECTION SUBCUTANEOUS at 00:28

## 2024-09-24 RX ADMIN — Medication 0.4 MILLIGRAM(S): at 22:39

## 2024-09-24 RX ADMIN — Medication 325 MILLIGRAM(S): at 11:43

## 2024-09-24 RX ADMIN — VALSARTAN 160 MILLIGRAM(S): 320 TABLET ORAL at 07:02

## 2024-09-24 RX ADMIN — Medication 3 MILLIGRAM(S): at 22:39

## 2024-09-24 RX ADMIN — SERTRALINE HYDROCHLORIDE 100 MILLIGRAM(S): 100 TABLET, FILM COATED ORAL at 11:43

## 2024-09-24 RX ADMIN — Medication 75 MILLIGRAM(S): at 11:43

## 2024-09-24 NOTE — PROGRESS NOTE ADULT - ASSESSMENT
89yo Male w. PMH CAD, Chronic CHF (type unkn), BPH, GERD, aortic stenosis, prosthetic heart valve, dementia, HTN, myelodysplastic syndrome presenting for weakness and fever of 102 at home admitted for weakness 2/2 COVID-19.     #Weakness  #COVID-19+   #Acute metabolic encephelopathy in s/o infection; improved   #Pancytopenia likely in s/o COVID   - No prior formal Dementia Dx   - CXR clear, CT chest - Tracheal bronchial thickening with tracheal and bronchial wall thickening. There are small bilateral pleural effusions.  - CT head - negative  - UA/UCx Neg   - blood culture NGTD   - completed Remdesivir regimen   - continue decadron PO daily   - fall precautions   - ID Consulted, cleared for d/c    - SLP recs appreciated, soft and bite sized diet   - Nutrition consulted  - PT/OT consult recommending SOMMER  - Enhanced supervision - pt has 24/7 aid at home    #HTN  #TAVR  - Cont Metoprolol succinate 25 mg qd  - Cont Valsartan 160 mg qd  - continue plavix     #Myelodysplastic Syndrome  #Anemia  #Pancytopenia- likely in s/o infection as above   - Cont Iron supplement  - Trend CBC    #BPH  - Cont Flomax 0.4 mg qd    #Depression  - Cont Sertraline 100 mg qhs    #DVT ppx  - Lovenox    GOC full code     Dispo: discharge planning    9/24 spoke with the son Ilya with an update            89yo Male w. PMH CAD, Chronic CHF (type unkn), BPH, GERD, aortic stenosis, prosthetic heart valve, dementia, HTN, myelodysplastic syndrome presenting for weakness and fever of 102 at home admitted for weakness 2/2 COVID-19.     #Weakness  #COVID-19+   #Acute metabolic encephelopathy in s/o infection; improved   #Pancytopenia likely in s/o COVID   - No prior formal Dementia Dx   - CXR clear, CT chest - Tracheal bronchial thickening with tracheal and bronchial wall thickening. There are small bilateral pleural effusions.  - CT head - negative  - UA/UCx Neg   - blood culture NGTD   - completed Remdesivir regimen   - decadron discontinued   - fall precautions   - ID Consulted, cleared for d/c    - SLP recs appreciated, soft and bite sized diet   - Nutrition consulted  - PT/OT consult recommending SOMMER   - Enhanced supervision - pt has 24/7 aid at home    #HTN  #TAVR  - Cont Metoprolol succinate 25 mg qd  - Cont Valsartan 160 mg qd  - continue plavix     #Myelodysplastic Syndrome  #Anemia  #Pancytopenia- likely in s/o infection as above   - Cont Iron supplement  - Trend CBC    #BPH  - Cont Flomax 0.4 mg qd    #Depression  - Cont Sertraline 100 mg qhs    #DVT ppx  - Lovenox    GOC full code     Dispo: discharge planning to [facility]     9/24 spoke with the son Ilya with an update

## 2024-09-24 NOTE — DISCHARGE NOTE NURSING/CASE MANAGEMENT/SOCIAL WORK - PATIENT PORTAL LINK FT
You can access the FollowMyHealth Patient Portal offered by [facility] by registering at the following website: [email or web address]. By joining [employer]’s FollowMyHealth portal, you will also be able to view your health information using other applications (apps) compatible with our system.

## 2024-09-25 VITALS
RESPIRATION RATE: 17 BRPM | SYSTOLIC BLOOD PRESSURE: 122 MMHG | HEART RATE: 49 BPM | DIASTOLIC BLOOD PRESSURE: 52 MMHG | TEMPERATURE: 98 F | OXYGEN SATURATION: 98 %

## 2024-09-25 LAB
ALBUMIN SERPL ELPH-MCNC: 2.8 G/DL — LOW (ref 3.3–5)
ALP SERPL-CCNC: 133 U/L — HIGH (ref 40–120)
ALT FLD-CCNC: 16 U/L — SIGNIFICANT CHANGE UP (ref 10–45)
AST SERPL-CCNC: 24 U/L — SIGNIFICANT CHANGE UP (ref 10–40)
BILIRUB DIRECT SERPL-MCNC: 0.3 MG/DL — SIGNIFICANT CHANGE UP (ref 0–0.3)
BILIRUB INDIRECT FLD-MCNC: 0.9 MG/DL — SIGNIFICANT CHANGE UP (ref 0.2–1)
BILIRUB SERPL-MCNC: 1.2 MG/DL — SIGNIFICANT CHANGE UP (ref 0.2–1.2)
CREAT SERPL-MCNC: 0.85 MG/DL — SIGNIFICANT CHANGE UP (ref 0.5–1.3)
CULTURE RESULTS: SIGNIFICANT CHANGE UP
CULTURE RESULTS: SIGNIFICANT CHANGE UP
EGFR: 79 ML/MIN/1.73M2 — SIGNIFICANT CHANGE UP
INR BLD: 1.57 RATIO — HIGH (ref 0.85–1.16)
PROT SERPL-MCNC: 5.2 G/DL — LOW (ref 6–8.3)
PROTHROM AB SERPL-ACNC: 18.5 SEC — HIGH (ref 9.9–13.4)
SPECIMEN SOURCE: SIGNIFICANT CHANGE UP
SPECIMEN SOURCE: SIGNIFICANT CHANGE UP

## 2024-09-25 PROCEDURE — 99239 HOSP IP/OBS DSCHRG MGMT >30: CPT

## 2024-09-25 RX ADMIN — SERTRALINE HYDROCHLORIDE 100 MILLIGRAM(S): 100 TABLET, FILM COATED ORAL at 11:24

## 2024-09-25 RX ADMIN — Medication 75 MILLIGRAM(S): at 11:23

## 2024-09-25 RX ADMIN — Medication 25 MILLIGRAM(S): at 06:09

## 2024-09-25 RX ADMIN — VALSARTAN 320 MILLIGRAM(S): 320 TABLET ORAL at 06:10

## 2024-09-25 RX ADMIN — Medication 325 MILLIGRAM(S): at 11:23

## 2024-09-25 NOTE — PROGRESS NOTE ADULT - SUBJECTIVE AND OBJECTIVE BOX
CC: f/u for  covid  Patient reports nothing that makes sense    REVIEW OF SYSTEMS:  All other review of systems negative (Comprehensive ROS)    Antimicrobials Day #  :    Other Medications Reviewed    T(F): 97.9 (09-24-24 @ 05:28), Max: 98.3 (09-23-24 @ 19:58)  HR: 66 (09-24-24 @ 05:28)  BP: 158/64 (09-24-24 @ 05:28)  RR: 17 (09-24-24 @ 05:28)  SpO2: 99% (09-24-24 @ 05:28)  Wt(kg): --    PHYSICAL EXAM:  General: alert, no acute distress  Eyes:  anicteric, no conjunctival injection, no discharge  Oropharynx: no lesions or injection 	  Neck: supple, without adenopathy  Lungs: clear to auscultation  Heart: regular rate and rhythm; no murmur, rubs or gallops  Abdomen: soft, nondistended, nontender, without mass or organomegaly  Skin: no lesions  Extremities: no clubbing, cyanosis, or edema  Neurologic: alert, confused , moves all extremities    LAB RESULTS:                        9.2    3.81  )-----------( 127      ( 24 Sep 2024 07:18 )             28.7     09-24    144  |  112[H]  |  19  ----------------------------<  70  3.5   |  20[L]  |  0.76    Ca    8.1[L]      24 Sep 2024 07:18    TPro  5.4[L]  /  Alb  2.8[L]  /  TBili  1.2  /  DBili  0.2  /  AST  32  /  ALT  15  /  AlkPhos  147[H]  09-24    LIVER FUNCTIONS - ( 24 Sep 2024 07:18 )  Alb: 2.8 g/dL / Pro: 5.4 g/dL / ALK PHOS: 147 U/L / ALT: 15 U/L / AST: 32 U/L / GGT: x           Urinalysis Basic - ( 24 Sep 2024 07:18 )    Color: x / Appearance: x / SG: x / pH: x  Gluc: 70 mg/dL / Ketone: x  / Bili: x / Urobili: x   Blood: x / Protein: x / Nitrite: x   Leuk Esterase: x / RBC: x / WBC x   Sq Epi: x / Non Sq Epi: x / Bacteria: x      MICROBIOLOGY:  RECENT CULTURES:  09-20 @ 18:42 Clean Catch Clean Catch (Midstream)     <10,000 CFU/mL Normal Urogenital Shyanne      09-20 @ 16:58 .Blood Blood-Peripheral     No growth at 72 Hours      09-20 @ 16:56 .Blood Blood-Peripheral     No growth at 72 Hours          RADIOLOGY REVIEWED:              Assessment:  Elderly man with dementia, mds, came in 4 d ago for fever, FTT, positive for covid. No hypoxia, no pneumonia so no indication for steroids or abx. Planned for d/c today. Continues to not be hypoxic    Plan:  monitor off antimicrobics  no ID objection to discharge
Patient is a 89y old  Male who presents with a chief complaint of Failure to thrive in adult     (21 Sep 2024 11:01)    Patient seen and examined at bedside.  S: Not offering acute complaints.   NAD- resting in bed.    ALLERGIES:  No Known Allergies    MEDICATIONS:  acetaminophen     Tablet .. 650 milliGRAM(s) Oral every 6 hours PRN  aluminum hydroxide/magnesium hydroxide/simethicone Suspension 30 milliLiter(s) Oral every 4 hours PRN  clopidogrel Tablet 75 milliGRAM(s) Oral daily  enoxaparin Injectable 40 milliGRAM(s) SubCutaneous every 24 hours  ferrous    sulfate 325 milliGRAM(s) Oral daily  melatonin 3 milliGRAM(s) Oral at bedtime PRN  metoprolol succinate ER 25 milliGRAM(s) Oral daily  remdesivir  IVPB   IV Intermittent   remdesivir  IVPB 100 milliGRAM(s) IV Intermittent every 24 hours  sertraline 100 milliGRAM(s) Oral daily  sodium chloride 0.9%. 1000 milliLiter(s) IV Continuous <Continuous>  tamsulosin 0.4 milliGRAM(s) Oral at bedtime  valsartan 160 milliGRAM(s) Oral daily    Vital Signs Last 24 Hrs  T(F): 97.5 (22 Sep 2024 05:52), Max: 97.6 (21 Sep 2024 21:14)  HR: 57 (22 Sep 2024 05:52) (57 - 65)  BP: 152/64 (22 Sep 2024 05:52) (136/62 - 152/64)  RR: 17 (22 Sep 2024 05:52) (17 - 17)  SpO2: 94% (22 Sep 2024 05:52) (93% - 94%)  I&O's Summary    21 Sep 2024 07:01  -  22 Sep 2024 07:00  --------------------------------------------------------  IN: 1010 mL / OUT: 0 mL / NET: 1010 mL        PHYSICAL EXAM:  General: NAD, Resting, easily awakes to verbal stimuli. A/Ox1- to self.   ENT: MMM, on RA  Lungs: Clear to auscultation bilaterally (Anteriorly)   Cardio: RR, S1/S2   Abdomen: Soft, NT/ND, Normal active Bowel Sounds   Extremities: No cyanosis, No pitting edema      LABS:                        8.6    3.11  )-----------( 136      ( 22 Sep 2024 06:15 )             26.3     09-22    142  |  109  |  21  ----------------------------<  75  3.5   |  19  |  0.82    Ca    7.8      22 Sep 2024 06:15  Phos  3.6     09-21  Mg     1.8     09-21    TPro  5.1  /  Alb  2.5  /  TBili  1.0  /  DBili  0.3  /  AST  22  /  ALT  10  /  AlkPhos  144  09-22      PT/INR - ( 22 Sep 2024 06:15 )   PT: 19.3 sec;   INR: 1.72 ratio    PTT - ( 20 Sep 2024 16:56 )  PTT:30.6 sec    Lactate, Blood: 0.9 mmol/L (09-20 @ 16:56)    CARDIAC MARKERS ( 20 Sep 2024 23:40 )  x     / 38.8 ng/L / x     / x     / x      CARDIAC MARKERS ( 20 Sep 2024 16:56 )  x     / 31.7 ng/L / x     / x     / x          Urinalysis Basic - ( 22 Sep 2024 06:15 )  Color: x / Appearance: x / SG: x / pH: x  Gluc: 75 mg/dL / Ketone: x  / Bili: x / Urobili: x   Blood: x / Protein: x / Nitrite: x   Leuk Esterase: x / RBC: x / WBC x   Sq Epi: x / Non Sq Epi: x / Bacteria: x      Culture - Urine (collected 20 Sep 2024 18:42)  Source: Clean Catch Clean Catch (Midstream)  Final Report (22 Sep 2024 06:36):    <10,000 CFU/mL Normal Urogenital Shyanne    Culture - Blood (collected 20 Sep 2024 16:58)  Source: .Blood Blood-Peripheral  Preliminary Report (21 Sep 2024 22:01):    No growth at 24 hours    Culture - Blood (collected 20 Sep 2024 16:56)  Source: .Blood Blood-Peripheral  Preliminary Report (21 Sep 2024 22:01):    No growth at 24 hours        Care Discussed with Consultants/Other Providers:   SAUL Benavides discussed case and plan with Dr. Neely 
Patient is a 89y old  Male who presents with a chief complaint of covid, FTT (21 Sep 2024 07:16)      Patient seen and examined at bedside. Pt Confused possibly hallucinating.  Pt states a "rat in the bed".      ALLERGIES:  No Known Allergies    MEDICATIONS  (STANDING):  clopidogrel Tablet 75 milliGRAM(s) Oral daily  enoxaparin Injectable 40 milliGRAM(s) SubCutaneous every 24 hours  ferrous    sulfate 325 milliGRAM(s) Oral daily  metoprolol succinate ER 25 milliGRAM(s) Oral daily  remdesivir  IVPB   IV Intermittent   sertraline 100 milliGRAM(s) Oral daily  sodium chloride 0.9%. 1000 milliLiter(s) (70 mL/Hr) IV Continuous <Continuous>  tamsulosin 0.4 milliGRAM(s) Oral at bedtime  valsartan 160 milliGRAM(s) Oral daily    MEDICATIONS  (PRN):  acetaminophen     Tablet .. 650 milliGRAM(s) Oral every 6 hours PRN Temp greater or equal to 38C (100.4F), Mild Pain (1 - 3)  aluminum hydroxide/magnesium hydroxide/simethicone Suspension 30 milliLiter(s) Oral every 4 hours PRN Dyspepsia  melatonin 3 milliGRAM(s) Oral at bedtime PRN Insomnia    Vital Signs Last 24 Hrs  T(F): 97.9 (21 Sep 2024 04:38), Max: 101.2 (20 Sep 2024 16:30)  HR: 96 (21 Sep 2024 09:35) (61 - 96)  BP: 117/54 (21 Sep 2024 09:35) (117/54 - 152/80)  RR: 18 (21 Sep 2024 09:35) (17 - 20)  SpO2: 96% (21 Sep 2024 09:35) (96% - 99%)  I&O's Summary    20 Sep 2024 07:01  -  21 Sep 2024 07:00  --------------------------------------------------------  IN: 660 mL / OUT: 150 mL / NET: 510 mL      PHYSICAL EXAM:  General: 89 y/o confused male in  NAD, A/O x 1   ENT: MMM  Neck: Supple, No JVD  Lungs: Course BS  to auscultation bilaterally, Non labored breathing   Cardio: RRR, S1/S2, No murmurs  Abdomen: Soft, Nontender, Nondistended; Bowel sounds present  Extremities: No calf tenderness, No pitting edema    LABS:                        9.6    3.45  )-----------( 142      ( 21 Sep 2024 07:48 )             29.9     09-21    141  |  107  |  20  ----------------------------<  87  3.9   |  24  |  0.93    Ca    8.0      21 Sep 2024 07:48  Phos  3.6     09-21  Mg     1.8     09-21    TPro  5.9  /  Alb  3.1  /  TBili  1.4  /  DBili  x   /  AST  20  /  ALT  15  /  AlkPhos  187  09-21      PT/INR - ( 21 Sep 2024 07:28 )   PT: 16.9 sec;   INR: 1.46 ratio         PTT - ( 20 Sep 2024 16:56 )  PTT:30.6 sec  Lactate, Blood: 0.9 mmol/L (09-20 @ 16:56)    CARDIAC MARKERS ( 20 Sep 2024 23:40 )  x     / 38.8 ng/L / x     / x     / x      CARDIAC MARKERS ( 20 Sep 2024 16:56 )  x     / 31.7 ng/L / x     / x     / x                            Urinalysis Basic - ( 21 Sep 2024 07:48 )    Color: x / Appearance: x / SG: x / pH: x  Gluc: 87 mg/dL / Ketone: x  / Bili: x / Urobili: x   Blood: x / Protein: x / Nitrite: x   Leuk Esterase: x / RBC: x / WBC x   Sq Epi: x / Non Sq Epi: x / Bacteria: x          RADIOLOGY & ADDITIONAL TESTS:    Care Discussed with Consultants/Other Providers:   
Patient is a 89y old  Male who presents with a chief complaint of covid, FTT (24 Sep 2024 07:06)      Patient seen and examined at bedside. No overnight events reported.     ALLERGIES:  No Known Allergies    MEDICATIONS  (STANDING):  clopidogrel Tablet 75 milliGRAM(s) Oral daily  enoxaparin Injectable 40 milliGRAM(s) SubCutaneous every 24 hours  ferrous    sulfate 325 milliGRAM(s) Oral daily  metoprolol succinate ER 25 milliGRAM(s) Oral daily  sertraline 100 milliGRAM(s) Oral daily  tamsulosin 0.4 milliGRAM(s) Oral at bedtime  valsartan 160 milliGRAM(s) Oral daily    MEDICATIONS  (PRN):  acetaminophen     Tablet .. 650 milliGRAM(s) Oral every 6 hours PRN Temp greater or equal to 38C (100.4F), Mild Pain (1 - 3)  aluminum hydroxide/magnesium hydroxide/simethicone Suspension 30 milliLiter(s) Oral every 4 hours PRN Dyspepsia  melatonin 3 milliGRAM(s) Oral at bedtime PRN Insomnia    Vital Signs Last 24 Hrs  T(F): 97.9 (24 Sep 2024 05:28), Max: 98.3 (23 Sep 2024 19:58)  HR: 66 (24 Sep 2024 05:28) (64 - 73)  BP: 158/64 (24 Sep 2024 05:28) (142/57 - 164/59)  RR: 17 (24 Sep 2024 05:28) (13 - 17)  SpO2: 99% (24 Sep 2024 05:28) (97% - 99%)  I&O's Summary    23 Sep 2024 07:01  -  24 Sep 2024 07:00  --------------------------------------------------------  IN: 325 mL / OUT: 400 mL / NET: -75 mL      PHYSICAL EXAM:  General: NAD, A/O x 2   ENT: No gross hearing impairment, Moist mucous membranes, no thrush  Neck: Supple, No JVD  Lungs: diminished to auscultation bilaterally, good air entry, non-labored breathing  Cardio: RRR, S1/S2, No murmur  Abdomen: Soft, Nontender, Nondistended; Bowel sounds present  Extremities: No calf tenderness, No cyanosis, No pitting edema  Psych: Appropriate mood and affect    LABS:                        9.2    3.81  )-----------( 127      ( 24 Sep 2024 07:18 )             28.7     09-24    144  |  112  |  19  ----------------------------<  70  3.5   |  20  |  0.76    Ca    8.1      24 Sep 2024 07:18    TPro  5.4  /  Alb  2.8  /  TBili  1.2  /  DBili  0.2  /  AST  32  /  ALT  15  /  AlkPhos  147  09-24          PT/INR - ( 24 Sep 2024 07:18 )   PT: 17.7 sec;   INR: 1.57 ratio                                     Urinalysis Basic - ( 24 Sep 2024 07:18 )    Color: x / Appearance: x / SG: x / pH: x  Gluc: 70 mg/dL / Ketone: x  / Bili: x / Urobili: x   Blood: x / Protein: x / Nitrite: x   Leuk Esterase: x / RBC: x / WBC x   Sq Epi: x / Non Sq Epi: x / Bacteria: x        Culture - Urine (collected 20 Sep 2024 18:42)  Source: Clean Catch Clean Catch (Midstream)  Final Report (22 Sep 2024 06:36):    <10,000 CFU/mL Normal Urogenital Shyanne    Culture - Blood (collected 20 Sep 2024 16:58)  Source: .Blood Blood-Peripheral  Preliminary Report (23 Sep 2024 22:01):    No growth at 72 Hours    Culture - Blood (collected 20 Sep 2024 16:56)  Source: .Blood Blood-Peripheral  Preliminary Report (23 Sep 2024 22:01):    No growth at 72 Hours        RADIOLOGY & ADDITIONAL TESTS:    Care Discussed with Consultants/Other Providers:   
Patient is a 89y old  Male who presents with a chief complaint of covid, FTT (25 Sep 2024 07:05)      Patient seen and examined at bedside. No overnight events reported.     ALLERGIES:  No Known Allergies    MEDICATIONS  (STANDING):  clopidogrel Tablet 75 milliGRAM(s) Oral daily  enoxaparin Injectable 40 milliGRAM(s) SubCutaneous every 24 hours  ferrous    sulfate 325 milliGRAM(s) Oral daily  metoprolol succinate ER 25 milliGRAM(s) Oral daily  sertraline 100 milliGRAM(s) Oral daily  tamsulosin 0.4 milliGRAM(s) Oral at bedtime  valsartan 320 milliGRAM(s) Oral daily    MEDICATIONS  (PRN):  acetaminophen     Tablet .. 650 milliGRAM(s) Oral every 6 hours PRN Temp greater or equal to 38C (100.4F), Mild Pain (1 - 3)  aluminum hydroxide/magnesium hydroxide/simethicone Suspension 30 milliLiter(s) Oral every 4 hours PRN Dyspepsia  melatonin 3 milliGRAM(s) Oral at bedtime PRN Insomnia    Vital Signs Last 24 Hrs  T(F): 97.1 (25 Sep 2024 05:24), Max: 97.4 (24 Sep 2024 19:32)  HR: 60 (25 Sep 2024 05:24) (56 - 60)  BP: 152/64 (25 Sep 2024 05:24) (152/64 - 164/62)  RR: 17 (25 Sep 2024 05:24) (15 - 17)  SpO2: 99% (25 Sep 2024 05:24) (99% - 99%)  I&O's Summary    PHYSICAL EXAM:  General: NAD, A/O x 1-2  ENT: No gross hearing impairment, Moist mucous membranes, no thrush  Neck: Supple, No JVD  Lungs: Clear to auscultation bilaterally, good air entry, non-labored breathing  Cardio: RRR, S1/S2, No murmur  Abdomen: Soft, Nontender, Nondistended; Bowel sounds present  Extremities: No calf tenderness, No cyanosis, No pitting edema  Psych: Appropriate mood and affect    LABS:                        9.2    3.81  )-----------( 127      ( 24 Sep 2024 07:18 )             28.7     09-25    x   |  x   |  x   ----------------------------<  x   x    |  x   |  0.85    Ca    8.1      24 Sep 2024 07:18    TPro  5.2  /  Alb  2.8  /  TBili  1.2  /  DBili  0.3  /  AST  24  /  ALT  16  /  AlkPhos  133  09-25          PT/INR - ( 25 Sep 2024 08:53 )   PT: 18.5 sec;   INR: 1.57 ratio                                     Urinalysis Basic - ( 24 Sep 2024 07:18 )    Color: x / Appearance: x / SG: x / pH: x  Gluc: 70 mg/dL / Ketone: x  / Bili: x / Urobili: x   Blood: x / Protein: x / Nitrite: x   Leuk Esterase: x / RBC: x / WBC x   Sq Epi: x / Non Sq Epi: x / Bacteria: x        Culture - Urine (collected 20 Sep 2024 18:42)  Source: Clean Catch Clean Catch (Midstream)  Final Report (22 Sep 2024 06:36):    <10,000 CFU/mL Normal Urogenital Shyanne    Culture - Blood (collected 20 Sep 2024 16:58)  Source: .Blood Blood-Peripheral  Preliminary Report (24 Sep 2024 22:01):    No growth at 4 days    Culture - Blood (collected 20 Sep 2024 16:56)  Source: .Blood Blood-Peripheral  Preliminary Report (24 Sep 2024 22:01):    No growth at 4 days        RADIOLOGY & ADDITIONAL TESTS:    Care Discussed with Consultants/Other Providers:   
Patient is a 89y old  Male who presents with a chief complaint of covid, FTT (23 Sep 2024 07:14)      Patient seen and examined at bedside. No overnight events reported.     ALLERGIES:  No Known Allergies    MEDICATIONS  (STANDING):  clopidogrel Tablet 75 milliGRAM(s) Oral daily  dexAMETHasone     Tablet 6 milliGRAM(s) Oral daily  enoxaparin Injectable 40 milliGRAM(s) SubCutaneous every 24 hours  ferrous    sulfate 325 milliGRAM(s) Oral daily  metoprolol succinate ER 25 milliGRAM(s) Oral daily  remdesivir  IVPB   IV Intermittent   remdesivir  IVPB 100 milliGRAM(s) IV Intermittent every 24 hours  sertraline 100 milliGRAM(s) Oral daily  sodium chloride 0.9%. 1000 milliLiter(s) (70 mL/Hr) IV Continuous <Continuous>  tamsulosin 0.4 milliGRAM(s) Oral at bedtime  valsartan 160 milliGRAM(s) Oral daily    MEDICATIONS  (PRN):  acetaminophen     Tablet .. 650 milliGRAM(s) Oral every 6 hours PRN Temp greater or equal to 38C (100.4F), Mild Pain (1 - 3)  aluminum hydroxide/magnesium hydroxide/simethicone Suspension 30 milliLiter(s) Oral every 4 hours PRN Dyspepsia  melatonin 3 milliGRAM(s) Oral at bedtime PRN Insomnia    Vital Signs Last 24 Hrs  T(F): 97.4 (23 Sep 2024 06:17), Max: 98 (22 Sep 2024 20:35)  HR: 64 (23 Sep 2024 06:17) (61 - 68)  BP: 154/57 (23 Sep 2024 06:17) (132/71 - 159/59)  RR: 16 (23 Sep 2024 06:17) (16 - 18)  SpO2: 94% (23 Sep 2024 06:17) (93% - 95%)  I&O's Summary    PHYSICAL EXAM:  General: NAD, A/O x 1-2   ENT: No gross hearing impairment, Moist mucous membranes, no thrush  Neck: Supple, No JVD  Lungs: crackles to auscultation bilaterally, good air entry, non-labored breathing  Cardio: RRR, S1/S2, No murmur  Abdomen: Soft, Nontender, Nondistended; Bowel sounds present  Extremities: No calf tenderness, No cyanosis, No pitting edema  Psych: Appropriate mood and affect    LABS:                        8.9    3.78  )-----------( 139      ( 23 Sep 2024 07:15 )             27.3     09-23    146  |  112  |  23  ----------------------------<  65  3.9   |  17  |  0.82    Ca    8.1      23 Sep 2024 07:15  Phos  3.6     09-21  Mg     1.8     09-21    TPro  5.2  /  Alb  2.7  /  TBili  1.0  /  DBili  0.3  /  AST  35  /  ALT  16  /  AlkPhos  145  09-23          PT/INR - ( 23 Sep 2024 07:15 )   PT: 19.1 sec;   INR: 1.66 ratio         PTT - ( 20 Sep 2024 16:56 )  PTT:30.6 sec  Lactate, Blood: 0.9 mmol/L (09-20 @ 16:56)      CARDIAC MARKERS ( 20 Sep 2024 23:40 )  x     / 38.8 ng/L / x     / x     / x      CARDIAC MARKERS ( 20 Sep 2024 16:56 )  x     / 31.7 ng/L / x     / x     / x                            Urinalysis Basic - ( 23 Sep 2024 07:15 )    Color: x / Appearance: x / SG: x / pH: x  Gluc: 65 mg/dL / Ketone: x  / Bili: x / Urobili: x   Blood: x / Protein: x / Nitrite: x   Leuk Esterase: x / RBC: x / WBC x   Sq Epi: x / Non Sq Epi: x / Bacteria: x        Culture - Urine (collected 20 Sep 2024 18:42)  Source: Clean Catch Clean Catch (Midstream)  Final Report (22 Sep 2024 06:36):    <10,000 CFU/mL Normal Urogenital Shyanne    Culture - Blood (collected 20 Sep 2024 16:58)  Source: .Blood Blood-Peripheral  Preliminary Report (22 Sep 2024 22:01):    No growth at 48 Hours    Culture - Blood (collected 20 Sep 2024 16:56)  Source: .Blood Blood-Peripheral  Preliminary Report (22 Sep 2024 22:01):    No growth at 48 Hours        RADIOLOGY & ADDITIONAL TESTS:    Care Discussed with Consultants/Other Providers:   
20

## 2024-09-25 NOTE — PROGRESS NOTE ADULT - TIME BILLING
Time spent includes direct patient care  (interview and examination of patient), discussion with other providers, support staff and/or patient's family members, review of medical records, ordering diagnostic tests and analyzing results, and documentation.

## 2024-09-25 NOTE — PROGRESS NOTE ADULT - NUTRITIONAL ASSESSMENT
This patient has been assessed with a concern for Malnutrition and has been determined to have a diagnosis/diagnoses of Moderate protein-calorie malnutrition.    This patient is being managed with:   Diet Soft and Bite Sized-  Supplement Feeding Modality:  Oral  Ensure Plus High Protein Cans or Servings Per Day:  1       Frequency:  Two Times a day  Entered: Sep 21 2024 10:56AM  
This patient has been assessed with a concern for Malnutrition and has been determined to have a diagnosis/diagnoses of Moderate protein-calorie malnutrition.    This patient is being managed with:   Diet Soft and Bite Sized-  Supplement Feeding Modality:  Oral  Ensure Plus High Protein Cans or Servings Per Day:  1       Frequency:  Two Times a day  Entered: Sep 21 2024 10:56AM    This patient has been assessed with a concern for Malnutrition and has been determined to have a diagnosis/diagnoses of Moderate protein-calorie malnutrition.    This patient is being managed with:   Diet Soft and Bite Sized-  Supplement Feeding Modality:  Oral  Ensure Plus High Protein Cans or Servings Per Day:  1       Frequency:  Two Times a day  Entered: Sep 21 2024 10:56AM  
This patient has been assessed with a concern for Malnutrition and has been determined to have a diagnosis/diagnoses of Moderate protein-calorie malnutrition.    This patient is being managed with:   Diet Soft and Bite Sized-  Supplement Feeding Modality:  Oral  Ensure Plus High Protein Cans or Servings Per Day:  1       Frequency:  Two Times a day  Entered: Sep 21 2024 10:56AM  
This patient has been assessed with a concern for Malnutrition and has been determined to have a diagnosis/diagnoses of Moderate protein-calorie malnutrition.    This patient is being managed with:   Diet Soft and Bite Sized-  Supplement Feeding Modality:  Oral  Ensure Plus High Protein Cans or Servings Per Day:  1       Frequency:  Two Times a day  Entered: Sep 21 2024 10:56AM

## 2024-09-25 NOTE — PROGRESS NOTE ADULT - ASSESSMENT
89yo Male w. PMH CAD, Chronic CHF (type unkn), BPH, GERD, aortic stenosis, prosthetic heart valve, dementia, HTN, myelodysplastic syndrome presenting for weakness and fever of 102 at home admitted for weakness 2/2 COVID-19.     #Weakness  #COVID-19+   #Acute metabolic encephelopathy in s/o infection; improved   #Pancytopenia likely in s/o COVID   - No prior formal Dementia Dx   - CXR clear, CT chest - Tracheal bronchial thickening with tracheal and bronchial wall thickening. There are small bilateral pleural effusions.  - CT head - negative  - UA/UCx Neg   - blood culture NGTD   - completed Remdesivir regimen   - fall precautions   - ID Consulted, cleared for d/c    - SLP recs appreciated, soft and bite sized diet   - Nutrition consulted  - PT/OT consult recommending S[facility]   - Enhanced supervision - pt has 24/7 aid at home    #HTN  #TAVR  - Cont Metoprolol succinate 25 mg qd  - valsartan increased, patient was hypertensive at time of discharge yesterday   - continue plavix     #Myelodysplastic Syndrome  #Anemia  #Pancytopenia- likely in s/o infection as above   - Cont Iron supplement  - Trend CBC    #BPH  - Cont Flomax 0.4 mg qd    #Depression  - Cont Sertraline 100 mg qhs    #DVT ppx  - Lovenox    GOC full code     Dispo: discharge planning; patient was hypertensive and could not be transferred yesterday, will reassess today     9/25 spoke with the son Ilya with an update            89yo Male w. PMH CAD, Chronic CHF (type unkn), BPH, GERD, aortic stenosis, prosthetic heart valve, dementia, HTN, myelodysplastic syndrome presenting for weakness and fever of 102 at home admitted for weakness 2/2 COVID-19.     #Weakness  #COVID-19+   #Acute metabolic encephelopathy in s/o infection; improved   #Pancytopenia likely in s/o COVID   - No prior formal Dementia Dx   - CXR clear, CT chest - Tracheal bronchial thickening with tracheal and bronchial wall thickening. There are small bilateral pleural effusions.  - CT head - negative  - UA/UCx Neg   - blood culture NGTD   - completed Remdesivir regimen   - fall precautions   - ID Consulted, cleared for d/c    - SLP recs appreciated, soft and bite sized diet   - Nutrition consulted  - PT/OT consult recommending S[facility]   - Enhanced supervision - pt has 24/7 aid at home    #HTN  #TAVR  - Cont Metoprolol succinate 25 mg qd  - valsartan increased, patient was hypertensive at time of discharge yesterday   - continue plavix     #Myelodysplastic Syndrome  #Anemia  #Pancytopenia- likely in s/o infection as above   - Cont Iron supplement  - Trend CBC    #BPH  - Cont Flomax 0.4 mg qd    #Depression  - Cont Sertraline 100 mg qhs    #DVT ppx  - Lovenox    GOC full code     Dispo: discharge planning; patient was hypertensive and could not be transferred yesterday, will reassess today   BP well controlled today for d/c     9/25 spoke with the son Ilya with an update

## 2024-09-25 NOTE — PROGRESS NOTE ADULT - NS ATTEND OPT1A GEN_ALL_CORE
History/Exam
History/Exam
History/Exam/Medical decision making

## 2024-10-07 ENCOUNTER — INPATIENT (INPATIENT)
Facility: HOSPITAL | Age: 88
LOS: 5 days | Discharge: NURSING, REHAB OR LONG-TERM CARE | DRG: 812 | End: 2024-10-13
Attending: STUDENT IN AN ORGANIZED HEALTH CARE EDUCATION/TRAINING PROGRAM | Admitting: INTERNAL MEDICINE
Payer: MEDICARE

## 2024-10-07 VITALS
HEIGHT: 63 IN | OXYGEN SATURATION: 100 % | DIASTOLIC BLOOD PRESSURE: 69 MMHG | SYSTOLIC BLOOD PRESSURE: 113 MMHG | RESPIRATION RATE: 18 BRPM | WEIGHT: 125 LBS | HEART RATE: 78 BPM | TEMPERATURE: 98 F

## 2024-10-07 DIAGNOSIS — Z98.890 OTHER SPECIFIED POSTPROCEDURAL STATES: Chronic | ICD-10-CM

## 2024-10-07 DIAGNOSIS — D64.9 ANEMIA, UNSPECIFIED: ICD-10-CM

## 2024-10-07 DIAGNOSIS — Z98.0 INTESTINAL BYPASS AND ANASTOMOSIS STATUS: Chronic | ICD-10-CM

## 2024-10-07 LAB
ABO RH CONFIRMATION: SIGNIFICANT CHANGE UP
ALBUMIN SERPL ELPH-MCNC: 3 G/DL — LOW (ref 3.3–5)
ALP SERPL-CCNC: 123 U/L — HIGH (ref 40–120)
ALT FLD-CCNC: 17 U/L — SIGNIFICANT CHANGE UP (ref 10–45)
ANION GAP SERPL CALC-SCNC: 11 MMOL/L — SIGNIFICANT CHANGE UP (ref 5–17)
APTT BLD: 21.1 SEC — LOW (ref 24.5–35.6)
AST SERPL-CCNC: 29 U/L — SIGNIFICANT CHANGE UP (ref 10–40)
BASOPHILS # BLD AUTO: 0.02 K/UL — SIGNIFICANT CHANGE UP (ref 0–0.2)
BASOPHILS NFR BLD AUTO: 0.4 % — SIGNIFICANT CHANGE UP (ref 0–2)
BILIRUB SERPL-MCNC: 1.7 MG/DL — HIGH (ref 0.2–1.2)
BLD GP AB SCN SERPL QL: SIGNIFICANT CHANGE UP
BUN SERPL-MCNC: 47 MG/DL — HIGH (ref 7–23)
CALCIUM SERPL-MCNC: 8.1 MG/DL — LOW (ref 8.4–10.5)
CHLORIDE SERPL-SCNC: 115 MMOL/L — HIGH (ref 96–108)
CO2 SERPL-SCNC: 25 MMOL/L — SIGNIFICANT CHANGE UP (ref 22–31)
CREAT SERPL-MCNC: 1.25 MG/DL — SIGNIFICANT CHANGE UP (ref 0.5–1.3)
EGFR: 52 ML/MIN/1.73M2 — LOW
EGFR: 52 ML/MIN/1.73M2 — LOW
EOSINOPHIL # BLD AUTO: 0 K/UL — SIGNIFICANT CHANGE UP (ref 0–0.5)
EOSINOPHIL NFR BLD AUTO: 0 % — SIGNIFICANT CHANGE UP (ref 0–6)
GLUCOSE SERPL-MCNC: 95 MG/DL — SIGNIFICANT CHANGE UP (ref 70–99)
HCT VFR BLD CALC: 15.8 % — CRITICAL LOW (ref 39–50)
HGB BLD-MCNC: 5.2 G/DL — CRITICAL LOW (ref 13–17)
IMM GRANULOCYTES NFR BLD AUTO: 1 % — HIGH (ref 0–0.9)
INR BLD: 1.51 RATIO — HIGH (ref 0.85–1.16)
LYMPHOCYTES # BLD AUTO: 1.38 K/UL — SIGNIFICANT CHANGE UP (ref 1–3.3)
LYMPHOCYTES # BLD AUTO: 26.7 % — SIGNIFICANT CHANGE UP (ref 13–44)
MCHC RBC-ENTMCNC: 32.9 GM/DL — SIGNIFICANT CHANGE UP (ref 32–36)
MCHC RBC-ENTMCNC: 34.9 PG — HIGH (ref 27–34)
MCV RBC AUTO: 106 FL — HIGH (ref 80–100)
MONOCYTES # BLD AUTO: 0.99 K/UL — HIGH (ref 0–0.9)
MONOCYTES NFR BLD AUTO: 19.1 % — HIGH (ref 2–14)
NEUTROPHILS # BLD AUTO: 2.73 K/UL — SIGNIFICANT CHANGE UP (ref 1.8–7.4)
NEUTROPHILS NFR BLD AUTO: 52.8 % — SIGNIFICANT CHANGE UP (ref 43–77)
NRBC # BLD: 1 /100 WBCS — HIGH (ref 0–0)
NRBC BLD-RTO: 1 /100 WBCS — HIGH (ref 0–0)
PLATELET # BLD AUTO: 250 K/UL — SIGNIFICANT CHANGE UP (ref 150–400)
POTASSIUM SERPL-MCNC: 4.2 MMOL/L — SIGNIFICANT CHANGE UP (ref 3.5–5.3)
POTASSIUM SERPL-SCNC: 4.2 MMOL/L — SIGNIFICANT CHANGE UP (ref 3.5–5.3)
PROT SERPL-MCNC: 5.4 G/DL — LOW (ref 6–8.3)
PROTHROM AB SERPL-ACNC: 17.7 SEC — HIGH (ref 9.9–13.4)
RBC # BLD: 1.49 M/UL — LOW (ref 4.2–5.8)
RBC # FLD: 22.4 % — HIGH (ref 10.3–14.5)
SODIUM SERPL-SCNC: 151 MMOL/L — HIGH (ref 135–145)
WBC # BLD: 5.17 K/UL — SIGNIFICANT CHANGE UP (ref 3.8–10.5)
WBC # FLD AUTO: 5.17 K/UL — SIGNIFICANT CHANGE UP (ref 3.8–10.5)

## 2024-10-07 PROCEDURE — 99223 1ST HOSP IP/OBS HIGH 75: CPT | Mod: GC

## 2024-10-07 PROCEDURE — 99291 CRITICAL CARE FIRST HOUR: CPT

## 2024-10-07 PROCEDURE — 93010 ELECTROCARDIOGRAM REPORT: CPT

## 2024-10-08 DIAGNOSIS — D64.9 ANEMIA, UNSPECIFIED: ICD-10-CM

## 2024-10-08 LAB
ALBUMIN SERPL ELPH-MCNC: 2.8 G/DL — LOW (ref 3.3–5)
ALP SERPL-CCNC: 121 U/L — HIGH (ref 40–120)
ALT FLD-CCNC: 16 U/L — SIGNIFICANT CHANGE UP (ref 10–45)
ANION GAP SERPL CALC-SCNC: 10 MMOL/L — SIGNIFICANT CHANGE UP (ref 5–17)
APPEARANCE UR: CLEAR — SIGNIFICANT CHANGE UP
AST SERPL-CCNC: 27 U/L — SIGNIFICANT CHANGE UP (ref 10–40)
BACTERIA # UR AUTO: ABNORMAL /HPF
BASOPHILS # BLD AUTO: 0.02 K/UL — SIGNIFICANT CHANGE UP (ref 0–0.2)
BASOPHILS NFR BLD AUTO: 0.2 % — SIGNIFICANT CHANGE UP (ref 0–2)
BILIRUB SERPL-MCNC: 4.5 MG/DL — HIGH (ref 0.2–1.2)
BILIRUB UR-MCNC: NEGATIVE — SIGNIFICANT CHANGE UP
BUN SERPL-MCNC: 48 MG/DL — HIGH (ref 7–23)
CALCIUM SERPL-MCNC: 8.2 MG/DL — LOW (ref 8.4–10.5)
CHLORIDE SERPL-SCNC: 116 MMOL/L — HIGH (ref 96–108)
CO2 SERPL-SCNC: 25 MMOL/L — SIGNIFICANT CHANGE UP (ref 22–31)
COLOR SPEC: YELLOW — SIGNIFICANT CHANGE UP
CREAT SERPL-MCNC: 1.02 MG/DL — SIGNIFICANT CHANGE UP (ref 0.5–1.3)
DIFF PNL FLD: NEGATIVE — SIGNIFICANT CHANGE UP
EGFR: 66 ML/MIN/1.73M2 — SIGNIFICANT CHANGE UP
EGFR: 66 ML/MIN/1.73M2 — SIGNIFICANT CHANGE UP
EOSINOPHIL # BLD AUTO: 0 K/UL — SIGNIFICANT CHANGE UP (ref 0–0.5)
EOSINOPHIL NFR BLD AUTO: 0 % — SIGNIFICANT CHANGE UP (ref 0–6)
FOLATE SERPL-MCNC: 12.3 NG/ML — SIGNIFICANT CHANGE UP
GLUCOSE SERPL-MCNC: 88 MG/DL — SIGNIFICANT CHANGE UP (ref 70–99)
GLUCOSE UR QL: NEGATIVE MG/DL — SIGNIFICANT CHANGE UP
HAPTOGLOB SERPL-MCNC: <20 MG/DL — LOW (ref 34–200)
HCT VFR BLD CALC: 20.6 % — CRITICAL LOW (ref 39–50)
HCT VFR BLD CALC: 22 % — LOW (ref 39–50)
HCT VFR BLD CALC: 22.5 % — LOW (ref 39–50)
HGB BLD-MCNC: 7 G/DL — CRITICAL LOW (ref 13–17)
HGB BLD-MCNC: 7.5 G/DL — LOW (ref 13–17)
HGB BLD-MCNC: 7.6 G/DL — LOW (ref 13–17)
IMM GRANULOCYTES NFR BLD AUTO: 4.1 % — HIGH (ref 0–0.9)
KETONES UR-MCNC: 15 MG/DL
LDH SERPL L TO P-CCNC: 317 U/L — HIGH (ref 50–242)
LEUKOCYTE ESTERASE UR-ACNC: ABNORMAL
LYMPHOCYTES # BLD AUTO: 1.29 K/UL — SIGNIFICANT CHANGE UP (ref 1–3.3)
LYMPHOCYTES # BLD AUTO: 15.2 % — SIGNIFICANT CHANGE UP (ref 13–44)
MCHC RBC-ENTMCNC: 31.7 PG — SIGNIFICANT CHANGE UP (ref 27–34)
MCHC RBC-ENTMCNC: 31.8 PG — SIGNIFICANT CHANGE UP (ref 27–34)
MCHC RBC-ENTMCNC: 31.8 PG — SIGNIFICANT CHANGE UP (ref 27–34)
MCHC RBC-ENTMCNC: 33.8 GM/DL — SIGNIFICANT CHANGE UP (ref 32–36)
MCHC RBC-ENTMCNC: 34 GM/DL — SIGNIFICANT CHANGE UP (ref 32–36)
MCHC RBC-ENTMCNC: 34.1 GM/DL — SIGNIFICANT CHANGE UP (ref 32–36)
MCV RBC AUTO: 93.2 FL — SIGNIFICANT CHANGE UP (ref 80–100)
MCV RBC AUTO: 93.2 FL — SIGNIFICANT CHANGE UP (ref 80–100)
MCV RBC AUTO: 94.1 FL — SIGNIFICANT CHANGE UP (ref 80–100)
MONOCYTES # BLD AUTO: 1.68 K/UL — HIGH (ref 0–0.9)
MONOCYTES NFR BLD AUTO: 19.9 % — HIGH (ref 2–14)
NEUTROPHILS # BLD AUTO: 5.12 K/UL — SIGNIFICANT CHANGE UP (ref 1.8–7.4)
NEUTROPHILS NFR BLD AUTO: 60.6 % — SIGNIFICANT CHANGE UP (ref 43–77)
NITRITE UR-MCNC: NEGATIVE — SIGNIFICANT CHANGE UP
NRBC # BLD: 1 /100 WBCS — HIGH (ref 0–0)
NRBC BLD-RTO: 1 /100 WBCS — HIGH (ref 0–0)
PH UR: 5.5 — SIGNIFICANT CHANGE UP (ref 5–8)
PLATELET # BLD AUTO: 183 K/UL — SIGNIFICANT CHANGE UP (ref 150–400)
PLATELET # BLD AUTO: 198 K/UL — SIGNIFICANT CHANGE UP (ref 150–400)
PLATELET # BLD AUTO: 207 K/UL — SIGNIFICANT CHANGE UP (ref 150–400)
POTASSIUM SERPL-MCNC: 3.8 MMOL/L — SIGNIFICANT CHANGE UP (ref 3.5–5.3)
POTASSIUM SERPL-SCNC: 3.8 MMOL/L — SIGNIFICANT CHANGE UP (ref 3.5–5.3)
PROT SERPL-MCNC: 5.1 G/DL — LOW (ref 6–8.3)
PROT UR-MCNC: NEGATIVE MG/DL — SIGNIFICANT CHANGE UP
RBC # BLD: 2.21 M/UL — LOW (ref 4.2–5.8)
RBC # BLD: 2.36 M/UL — LOW (ref 4.2–5.8)
RBC # BLD: 2.39 M/UL — LOW (ref 4.2–5.8)
RBC # FLD: 24.8 % — HIGH (ref 10.3–14.5)
RBC # FLD: 25.7 % — HIGH (ref 10.3–14.5)
RBC # FLD: 26.7 % — HIGH (ref 10.3–14.5)
RBC CASTS # UR COMP ASSIST: 2 /HPF — SIGNIFICANT CHANGE UP (ref 0–4)
RETICS #: 41.2 K/UL — SIGNIFICANT CHANGE UP (ref 25–125)
RETICS/RBC NFR: 1.8 % — SIGNIFICANT CHANGE UP (ref 0.5–2.5)
SODIUM SERPL-SCNC: 151 MMOL/L — HIGH (ref 135–145)
SP GR SPEC: 1.02 — SIGNIFICANT CHANGE UP (ref 1–1.03)
UROBILINOGEN FLD QL: 1 MG/DL — SIGNIFICANT CHANGE UP (ref 0.2–1)
VIT B12 SERPL-MCNC: 1132 PG/ML — SIGNIFICANT CHANGE UP (ref 232–1245)
WBC # BLD: 6.9 K/UL — SIGNIFICANT CHANGE UP (ref 3.8–10.5)
WBC # BLD: 7.62 K/UL — SIGNIFICANT CHANGE UP (ref 3.8–10.5)
WBC # BLD: 8.46 K/UL — SIGNIFICANT CHANGE UP (ref 3.8–10.5)
WBC # FLD AUTO: 6.9 K/UL — SIGNIFICANT CHANGE UP (ref 3.8–10.5)
WBC # FLD AUTO: 7.62 K/UL — SIGNIFICANT CHANGE UP (ref 3.8–10.5)
WBC # FLD AUTO: 8.46 K/UL — SIGNIFICANT CHANGE UP (ref 3.8–10.5)
WBC UR QL: 4 /HPF — SIGNIFICANT CHANGE UP (ref 0–5)

## 2024-10-08 PROCEDURE — 99223 1ST HOSP IP/OBS HIGH 75: CPT

## 2024-10-08 PROCEDURE — 99222 1ST HOSP IP/OBS MODERATE 55: CPT

## 2024-10-08 PROCEDURE — 99233 SBSQ HOSP IP/OBS HIGH 50: CPT

## 2024-10-08 PROCEDURE — 99498 ADVNCD CARE PLAN ADDL 30 MIN: CPT

## 2024-10-08 PROCEDURE — 99222 1ST HOSP IP/OBS MODERATE 55: CPT | Mod: FS

## 2024-10-08 PROCEDURE — 99497 ADVNCD CARE PLAN 30 MIN: CPT | Mod: 25

## 2024-10-08 RX ORDER — METOPROLOL SUCCINATE 50 MG/1
25 TABLET, EXTENDED RELEASE ORAL DAILY
Refills: 0 | Status: DISCONTINUED | OUTPATIENT
Start: 2024-10-08 | End: 2024-10-08

## 2024-10-08 RX ORDER — MELATONIN 5 MG
3 TABLET ORAL AT BEDTIME
Refills: 0 | Status: DISCONTINUED | OUTPATIENT
Start: 2024-10-08 | End: 2024-10-13

## 2024-10-08 RX ORDER — ONDANSETRON HCL/PF 4 MG/2 ML
4 VIAL (ML) INJECTION EVERY 8 HOURS
Refills: 0 | Status: DISCONTINUED | OUTPATIENT
Start: 2024-10-08 | End: 2024-10-13

## 2024-10-08 RX ORDER — SODIUM CHLORIDE 9 G/1000ML
1000 INJECTION, SOLUTION INTRAVENOUS
Refills: 0 | Status: DISCONTINUED | OUTPATIENT
Start: 2024-10-08 | End: 2024-10-08

## 2024-10-08 RX ORDER — SERTRALINE 100 MG/1
100 TABLET, FILM COATED ORAL DAILY
Refills: 0 | Status: DISCONTINUED | OUTPATIENT
Start: 2024-10-08 | End: 2024-10-13

## 2024-10-08 RX ORDER — FERROUS SULFATE 137(45) MG
325 TABLET, EXTENDED RELEASE ORAL DAILY
Refills: 0 | Status: DISCONTINUED | OUTPATIENT
Start: 2024-10-08 | End: 2024-10-13

## 2024-10-08 RX ORDER — VALSARTAN 320 MG/1
1 TABLET ORAL
Refills: 0 | DISCHARGE

## 2024-10-08 RX ORDER — LOSARTAN POTASSIUM 100 MG/1
100 TABLET, FILM COATED ORAL DAILY
Refills: 0 | Status: DISCONTINUED | OUTPATIENT
Start: 2024-10-08 | End: 2024-10-08

## 2024-10-08 RX ORDER — FERROUS SULFATE 325(65) MG
45 TABLET ORAL
Refills: 0 | DISCHARGE

## 2024-10-08 RX ORDER — TAMSULOSIN HYDROCHLORIDE 0.4 MG/1
0.4 CAPSULE ORAL AT BEDTIME
Refills: 0 | Status: DISCONTINUED | OUTPATIENT
Start: 2024-10-08 | End: 2024-10-13

## 2024-10-08 RX ORDER — METOPROLOL SUCCINATE 50 MG/1
25 TABLET, EXTENDED RELEASE ORAL DAILY
Refills: 0 | Status: DISCONTINUED | OUTPATIENT
Start: 2024-10-08 | End: 2024-10-11

## 2024-10-08 RX ORDER — MAGNESIUM, ALUMINUM HYDROXIDE 200-200 MG
30 TABLET,CHEWABLE ORAL EVERY 4 HOURS
Refills: 0 | Status: DISCONTINUED | OUTPATIENT
Start: 2024-10-08 | End: 2024-10-13

## 2024-10-08 RX ORDER — SERTRALINE HYDROCHLORIDE 100 MG/1
1 TABLET, FILM COATED ORAL
Refills: 0 | DISCHARGE

## 2024-10-08 RX ORDER — ACETAMINOPHEN 500 MG/5ML
650 LIQUID (ML) ORAL EVERY 6 HOURS
Refills: 0 | Status: DISCONTINUED | OUTPATIENT
Start: 2024-10-08 | End: 2024-10-13

## 2024-10-08 RX ORDER — CEFTRIAXONE 500 MG/1
1000 INJECTION, POWDER, FOR SOLUTION INTRAMUSCULAR; INTRAVENOUS EVERY 24 HOURS
Refills: 0 | Status: COMPLETED | OUTPATIENT
Start: 2024-10-08 | End: 2024-10-10

## 2024-10-08 RX ORDER — SODIUM CHLORIDE 9 G/1000ML
1000 INJECTION, SOLUTION INTRAVENOUS
Refills: 0 | Status: DISCONTINUED | OUTPATIENT
Start: 2024-10-08 | End: 2024-10-09

## 2024-10-08 RX ADMIN — SODIUM CHLORIDE 50 MILLILITER(S): 9 INJECTION, SOLUTION INTRAVENOUS at 07:44

## 2024-10-08 RX ADMIN — Medication 40 MILLIGRAM(S): at 17:33

## 2024-10-08 RX ADMIN — Medication 40 MILLIGRAM(S): at 07:44

## 2024-10-08 RX ADMIN — CEFTRIAXONE 100 MILLIGRAM(S): 500 INJECTION, POWDER, FOR SOLUTION INTRAMUSCULAR; INTRAVENOUS at 22:04

## 2024-10-09 LAB
ALBUMIN SERPL ELPH-MCNC: 2.6 G/DL — LOW (ref 3.3–5)
ALP SERPL-CCNC: 121 U/L — HIGH (ref 40–120)
ALT FLD-CCNC: 19 U/L — SIGNIFICANT CHANGE UP (ref 10–45)
ANION GAP SERPL CALC-SCNC: 11 MMOL/L — SIGNIFICANT CHANGE UP (ref 5–17)
AST SERPL-CCNC: 33 U/L — SIGNIFICANT CHANGE UP (ref 10–40)
BASOPHILS # BLD AUTO: 0.04 K/UL — SIGNIFICANT CHANGE UP (ref 0–0.2)
BASOPHILS NFR BLD AUTO: 0.5 % — SIGNIFICANT CHANGE UP (ref 0–2)
BILIRUB SERPL-MCNC: 2.9 MG/DL — HIGH (ref 0.2–1.2)
BUN SERPL-MCNC: 35 MG/DL — HIGH (ref 7–23)
CALCIUM SERPL-MCNC: 8 MG/DL — LOW (ref 8.4–10.5)
CHLORIDE SERPL-SCNC: 116 MMOL/L — HIGH (ref 96–108)
CO2 SERPL-SCNC: 24 MMOL/L — SIGNIFICANT CHANGE UP (ref 22–31)
CREAT SERPL-MCNC: 0.89 MG/DL — SIGNIFICANT CHANGE UP (ref 0.5–1.3)
EGFR: 77 ML/MIN/1.73M2 — SIGNIFICANT CHANGE UP
EGFR: 77 ML/MIN/1.73M2 — SIGNIFICANT CHANGE UP
EOSINOPHIL # BLD AUTO: 0.04 K/UL — SIGNIFICANT CHANGE UP (ref 0–0.5)
EOSINOPHIL NFR BLD AUTO: 0.5 % — SIGNIFICANT CHANGE UP (ref 0–6)
GLUCOSE SERPL-MCNC: 92 MG/DL — SIGNIFICANT CHANGE UP (ref 70–99)
HCT VFR BLD CALC: 21.9 % — LOW (ref 39–50)
HGB BLD-MCNC: 7.4 G/DL — LOW (ref 13–17)
IMM GRANULOCYTES NFR BLD AUTO: 4.8 % — HIGH (ref 0–0.9)
LYMPHOCYTES # BLD AUTO: 1.01 K/UL — SIGNIFICANT CHANGE UP (ref 1–3.3)
LYMPHOCYTES # BLD AUTO: 13.2 % — SIGNIFICANT CHANGE UP (ref 13–44)
MCHC RBC-ENTMCNC: 31.9 PG — SIGNIFICANT CHANGE UP (ref 27–34)
MCHC RBC-ENTMCNC: 33.8 GM/DL — SIGNIFICANT CHANGE UP (ref 32–36)
MCV RBC AUTO: 94.4 FL — SIGNIFICANT CHANGE UP (ref 80–100)
MONOCYTES # BLD AUTO: 1.42 K/UL — HIGH (ref 0–0.9)
MONOCYTES NFR BLD AUTO: 18.5 % — HIGH (ref 2–14)
NEUTROPHILS # BLD AUTO: 4.78 K/UL — SIGNIFICANT CHANGE UP (ref 1.8–7.4)
NEUTROPHILS NFR BLD AUTO: 62.5 % — SIGNIFICANT CHANGE UP (ref 43–77)
NRBC # BLD: 1 /100 WBCS — HIGH (ref 0–0)
NRBC BLD-RTO: 1 /100 WBCS — HIGH (ref 0–0)
OB PNL STL: POSITIVE
PLATELET # BLD AUTO: 174 K/UL — SIGNIFICANT CHANGE UP (ref 150–400)
POTASSIUM SERPL-MCNC: 3.6 MMOL/L — SIGNIFICANT CHANGE UP (ref 3.5–5.3)
POTASSIUM SERPL-SCNC: 3.6 MMOL/L — SIGNIFICANT CHANGE UP (ref 3.5–5.3)
PROCALCITONIN SERPL-MCNC: 0.18 NG/ML — HIGH
PROT SERPL-MCNC: 4.9 G/DL — LOW (ref 6–8.3)
RBC # BLD: 2.32 M/UL — LOW (ref 4.2–5.8)
RBC # FLD: 26.5 % — HIGH (ref 10.3–14.5)
SODIUM SERPL-SCNC: 151 MMOL/L — HIGH (ref 135–145)
WBC # BLD: 7.66 K/UL — SIGNIFICANT CHANGE UP (ref 3.8–10.5)
WBC # FLD AUTO: 7.66 K/UL — SIGNIFICANT CHANGE UP (ref 3.8–10.5)

## 2024-10-09 PROCEDURE — 99232 SBSQ HOSP IP/OBS MODERATE 35: CPT

## 2024-10-09 PROCEDURE — 99233 SBSQ HOSP IP/OBS HIGH 50: CPT

## 2024-10-09 RX ORDER — SODIUM CHLORIDE 9 G/1000ML
1000 INJECTION, SOLUTION INTRAVENOUS
Refills: 0 | Status: DISCONTINUED | OUTPATIENT
Start: 2024-10-09 | End: 2024-10-10

## 2024-10-09 RX ADMIN — SERTRALINE 100 MILLIGRAM(S): 100 TABLET, FILM COATED ORAL at 11:51

## 2024-10-09 RX ADMIN — Medication 40 MILLIGRAM(S): at 05:25

## 2024-10-09 RX ADMIN — SODIUM CHLORIDE 80 MILLILITER(S): 9 INJECTION, SOLUTION INTRAVENOUS at 08:40

## 2024-10-09 RX ADMIN — Medication 40 MILLIGRAM(S): at 17:44

## 2024-10-09 RX ADMIN — Medication 325 MILLIGRAM(S): at 11:50

## 2024-10-09 RX ADMIN — SODIUM CHLORIDE 80 MILLILITER(S): 9 INJECTION, SOLUTION INTRAVENOUS at 17:47

## 2024-10-09 RX ADMIN — CEFTRIAXONE 100 MILLIGRAM(S): 500 INJECTION, POWDER, FOR SOLUTION INTRAMUSCULAR; INTRAVENOUS at 21:03

## 2024-10-09 RX ADMIN — TAMSULOSIN HYDROCHLORIDE 0.4 MILLIGRAM(S): 0.4 CAPSULE ORAL at 21:01

## 2024-10-10 LAB
ALBUMIN SERPL ELPH-MCNC: 2.2 G/DL — LOW (ref 3.3–5)
ALP SERPL-CCNC: 111 U/L — SIGNIFICANT CHANGE UP (ref 40–120)
ALT FLD-CCNC: 18 U/L — SIGNIFICANT CHANGE UP (ref 10–45)
ANION GAP SERPL CALC-SCNC: 8 MMOL/L — SIGNIFICANT CHANGE UP (ref 5–17)
AST SERPL-CCNC: 31 U/L — SIGNIFICANT CHANGE UP (ref 10–40)
BASOPHILS # BLD AUTO: 0.04 K/UL — SIGNIFICANT CHANGE UP (ref 0–0.2)
BASOPHILS NFR BLD AUTO: 0.6 % — SIGNIFICANT CHANGE UP (ref 0–2)
BILIRUB SERPL-MCNC: 1.7 MG/DL — HIGH (ref 0.2–1.2)
BUN SERPL-MCNC: 23 MG/DL — SIGNIFICANT CHANGE UP (ref 7–23)
CALCIUM SERPL-MCNC: 7.7 MG/DL — LOW (ref 8.4–10.5)
CHLORIDE SERPL-SCNC: 110 MMOL/L — HIGH (ref 96–108)
CO2 SERPL-SCNC: 25 MMOL/L — SIGNIFICANT CHANGE UP (ref 22–31)
CREAT SERPL-MCNC: 0.88 MG/DL — SIGNIFICANT CHANGE UP (ref 0.5–1.3)
EGFR: 78 ML/MIN/1.73M2 — SIGNIFICANT CHANGE UP
EGFR: 78 ML/MIN/1.73M2 — SIGNIFICANT CHANGE UP
EOSINOPHIL # BLD AUTO: 0.02 K/UL — SIGNIFICANT CHANGE UP (ref 0–0.5)
EOSINOPHIL NFR BLD AUTO: 0.3 % — SIGNIFICANT CHANGE UP (ref 0–6)
GLUCOSE SERPL-MCNC: 128 MG/DL — HIGH (ref 70–99)
HCT VFR BLD CALC: 21.4 % — LOW (ref 39–50)
HGB BLD-MCNC: 6.9 G/DL — CRITICAL LOW (ref 13–17)
IMM GRANULOCYTES NFR BLD AUTO: 4.1 % — HIGH (ref 0–0.9)
LYMPHOCYTES # BLD AUTO: 1.41 K/UL — SIGNIFICANT CHANGE UP (ref 1–3.3)
LYMPHOCYTES # BLD AUTO: 22.5 % — SIGNIFICANT CHANGE UP (ref 13–44)
MAGNESIUM SERPL-MCNC: 1.9 MG/DL — SIGNIFICANT CHANGE UP (ref 1.6–2.6)
MCHC RBC-ENTMCNC: 31.7 PG — SIGNIFICANT CHANGE UP (ref 27–34)
MCHC RBC-ENTMCNC: 32.2 GM/DL — SIGNIFICANT CHANGE UP (ref 32–36)
MCV RBC AUTO: 98.2 FL — SIGNIFICANT CHANGE UP (ref 80–100)
MONOCYTES # BLD AUTO: 1.3 K/UL — HIGH (ref 0–0.9)
MONOCYTES NFR BLD AUTO: 20.7 % — HIGH (ref 2–14)
NEUTROPHILS # BLD AUTO: 3.25 K/UL — SIGNIFICANT CHANGE UP (ref 1.8–7.4)
NEUTROPHILS NFR BLD AUTO: 51.8 % — SIGNIFICANT CHANGE UP (ref 43–77)
NRBC # BLD: 1 /100 WBCS — HIGH (ref 0–0)
NRBC BLD-RTO: 1 /100 WBCS — HIGH (ref 0–0)
PLATELET # BLD AUTO: 131 K/UL — LOW (ref 150–400)
POTASSIUM SERPL-MCNC: 3.1 MMOL/L — LOW (ref 3.5–5.3)
POTASSIUM SERPL-SCNC: 3.1 MMOL/L — LOW (ref 3.5–5.3)
PROT SERPL-MCNC: 4.4 G/DL — LOW (ref 6–8.3)
RBC # BLD: 2.18 M/UL — LOW (ref 4.2–5.8)
RBC # FLD: 25.3 % — HIGH (ref 10.3–14.5)
SODIUM SERPL-SCNC: 143 MMOL/L — SIGNIFICANT CHANGE UP (ref 135–145)
WBC # BLD: 6.28 K/UL — SIGNIFICANT CHANGE UP (ref 3.8–10.5)
WBC # FLD AUTO: 6.28 K/UL — SIGNIFICANT CHANGE UP (ref 3.8–10.5)

## 2024-10-10 PROCEDURE — 99233 SBSQ HOSP IP/OBS HIGH 50: CPT

## 2024-10-10 PROCEDURE — 99232 SBSQ HOSP IP/OBS MODERATE 35: CPT

## 2024-10-10 RX ORDER — LOSARTAN POTASSIUM 100 MG/1
100 TABLET, FILM COATED ORAL DAILY
Refills: 0 | Status: DISCONTINUED | OUTPATIENT
Start: 2024-10-10 | End: 2024-10-13

## 2024-10-10 RX ORDER — LANOLIN/MINERAL OIL/PETROLATUM
1 OINTMENT (GRAM) OPHTHALMIC (EYE) THREE TIMES A DAY
Refills: 0 | Status: DISCONTINUED | OUTPATIENT
Start: 2024-10-10 | End: 2024-10-13

## 2024-10-10 RX ADMIN — Medication 40 MILLIGRAM(S): at 18:02

## 2024-10-10 RX ADMIN — SERTRALINE 100 MILLIGRAM(S): 100 TABLET, FILM COATED ORAL at 12:24

## 2024-10-10 RX ADMIN — TAMSULOSIN HYDROCHLORIDE 0.4 MILLIGRAM(S): 0.4 CAPSULE ORAL at 22:21

## 2024-10-10 RX ADMIN — METOPROLOL SUCCINATE 25 MILLIGRAM(S): 50 TABLET, EXTENDED RELEASE ORAL at 06:26

## 2024-10-10 RX ADMIN — Medication 3 MILLIGRAM(S): at 22:21

## 2024-10-10 RX ADMIN — Medication 40 MILLIEQUIVALENT(S): at 12:24

## 2024-10-10 RX ADMIN — Medication 1 DROP(S): at 22:21

## 2024-10-10 RX ADMIN — LOSARTAN POTASSIUM 100 MILLIGRAM(S): 100 TABLET, FILM COATED ORAL at 09:22

## 2024-10-10 RX ADMIN — Medication 325 MILLIGRAM(S): at 12:24

## 2024-10-10 RX ADMIN — CEFTRIAXONE 100 MILLIGRAM(S): 500 INJECTION, POWDER, FOR SOLUTION INTRAMUSCULAR; INTRAVENOUS at 22:21

## 2024-10-10 RX ADMIN — Medication 40 MILLIGRAM(S): at 06:26

## 2024-10-11 ENCOUNTER — TRANSCRIPTION ENCOUNTER (OUTPATIENT)
Age: 89
End: 2024-10-11

## 2024-10-11 LAB
ANION GAP SERPL CALC-SCNC: 9 MMOL/L — SIGNIFICANT CHANGE UP (ref 5–17)
BUN SERPL-MCNC: 17 MG/DL — SIGNIFICANT CHANGE UP (ref 7–23)
CALCIUM SERPL-MCNC: 8 MG/DL — LOW (ref 8.4–10.5)
CHLORIDE SERPL-SCNC: 113 MMOL/L — HIGH (ref 96–108)
CO2 SERPL-SCNC: 25 MMOL/L — SIGNIFICANT CHANGE UP (ref 22–31)
CREAT SERPL-MCNC: 0.95 MG/DL — SIGNIFICANT CHANGE UP (ref 0.5–1.3)
EGFR: 73 ML/MIN/1.73M2 — SIGNIFICANT CHANGE UP
EGFR: 73 ML/MIN/1.73M2 — SIGNIFICANT CHANGE UP
GLUCOSE SERPL-MCNC: 99 MG/DL — SIGNIFICANT CHANGE UP (ref 70–99)
HCT VFR BLD CALC: 27.4 % — LOW (ref 39–50)
HGB BLD-MCNC: 9.3 G/DL — LOW (ref 13–17)
MCHC RBC-ENTMCNC: 31 PG — SIGNIFICANT CHANGE UP (ref 27–34)
MCHC RBC-ENTMCNC: 33.9 GM/DL — SIGNIFICANT CHANGE UP (ref 32–36)
MCV RBC AUTO: 91.3 FL — SIGNIFICANT CHANGE UP (ref 80–100)
NRBC # BLD: 1 /100 WBCS — HIGH (ref 0–0)
NRBC BLD-RTO: 1 /100 WBCS — HIGH (ref 0–0)
PLATELET # BLD AUTO: 149 K/UL — LOW (ref 150–400)
POTASSIUM SERPL-MCNC: 3.6 MMOL/L — SIGNIFICANT CHANGE UP (ref 3.5–5.3)
POTASSIUM SERPL-SCNC: 3.6 MMOL/L — SIGNIFICANT CHANGE UP (ref 3.5–5.3)
RBC # BLD: 3 M/UL — LOW (ref 4.2–5.8)
RBC # FLD: 23.7 % — HIGH (ref 10.3–14.5)
SODIUM SERPL-SCNC: 147 MMOL/L — HIGH (ref 135–145)
WBC # BLD: 6.14 K/UL — SIGNIFICANT CHANGE UP (ref 3.8–10.5)
WBC # FLD AUTO: 6.14 K/UL — SIGNIFICANT CHANGE UP (ref 3.8–10.5)

## 2024-10-11 PROCEDURE — 99233 SBSQ HOSP IP/OBS HIGH 50: CPT

## 2024-10-11 PROCEDURE — 99232 SBSQ HOSP IP/OBS MODERATE 35: CPT

## 2024-10-11 RX ORDER — METOPROLOL SUCCINATE 50 MG/1
12.5 TABLET, EXTENDED RELEASE ORAL
Refills: 0 | Status: DISCONTINUED | OUTPATIENT
Start: 2024-10-12 | End: 2024-10-13

## 2024-10-11 RX ADMIN — Medication 40 MILLIGRAM(S): at 06:00

## 2024-10-11 RX ADMIN — Medication 3 MILLIGRAM(S): at 22:23

## 2024-10-11 RX ADMIN — LOSARTAN POTASSIUM 100 MILLIGRAM(S): 100 TABLET, FILM COATED ORAL at 06:01

## 2024-10-11 RX ADMIN — SERTRALINE 100 MILLIGRAM(S): 100 TABLET, FILM COATED ORAL at 12:14

## 2024-10-11 RX ADMIN — Medication 40 MILLIGRAM(S): at 17:42

## 2024-10-11 RX ADMIN — Medication 650 MILLIGRAM(S): at 19:39

## 2024-10-11 RX ADMIN — Medication 1 DROP(S): at 06:01

## 2024-10-11 RX ADMIN — Medication 1 DROP(S): at 12:00

## 2024-10-11 RX ADMIN — METOPROLOL SUCCINATE 25 MILLIGRAM(S): 50 TABLET, EXTENDED RELEASE ORAL at 06:00

## 2024-10-11 RX ADMIN — TAMSULOSIN HYDROCHLORIDE 0.4 MILLIGRAM(S): 0.4 CAPSULE ORAL at 22:23

## 2024-10-11 RX ADMIN — Medication 325 MILLIGRAM(S): at 12:14

## 2024-10-11 RX ADMIN — Medication 650 MILLIGRAM(S): at 20:15

## 2024-10-11 RX ADMIN — Medication 1 DROP(S): at 22:24

## 2024-10-12 ENCOUNTER — TRANSCRIPTION ENCOUNTER (OUTPATIENT)
Age: 89
End: 2024-10-12

## 2024-10-12 LAB
-  AMPICILLIN/SULBACTAM: SIGNIFICANT CHANGE UP
-  AMPICILLIN: SIGNIFICANT CHANGE UP
-  AZTREONAM: SIGNIFICANT CHANGE UP
-  CEFAZOLIN: SIGNIFICANT CHANGE UP
-  CEFEPIME: SIGNIFICANT CHANGE UP
-  CEFTRIAXONE: SIGNIFICANT CHANGE UP
-  CEFUROXIME: SIGNIFICANT CHANGE UP
-  CIPROFLOXACIN: SIGNIFICANT CHANGE UP
-  ERTAPENEM: SIGNIFICANT CHANGE UP
-  GENTAMICIN: SIGNIFICANT CHANGE UP
-  LEVOFLOXACIN: SIGNIFICANT CHANGE UP
-  MEROPENEM: SIGNIFICANT CHANGE UP
-  NITROFURANTOIN: SIGNIFICANT CHANGE UP
-  PIPERACILLIN/TAZOBACTAM: SIGNIFICANT CHANGE UP
-  TOBRAMYCIN: SIGNIFICANT CHANGE UP
-  TRIMETHOPRIM/SULFAMETHOXAZOLE: SIGNIFICANT CHANGE UP
ANION GAP SERPL CALC-SCNC: 8 MMOL/L — SIGNIFICANT CHANGE UP (ref 5–17)
BUN SERPL-MCNC: 20 MG/DL — SIGNIFICANT CHANGE UP (ref 7–23)
CALCIUM SERPL-MCNC: 8.1 MG/DL — LOW (ref 8.4–10.5)
CHLORIDE SERPL-SCNC: 111 MMOL/L — HIGH (ref 96–108)
CO2 SERPL-SCNC: 24 MMOL/L — SIGNIFICANT CHANGE UP (ref 22–31)
CREAT SERPL-MCNC: 0.92 MG/DL — SIGNIFICANT CHANGE UP (ref 0.5–1.3)
CULTURE RESULTS: ABNORMAL
EGFR: 76 ML/MIN/1.73M2 — SIGNIFICANT CHANGE UP
EGFR: 76 ML/MIN/1.73M2 — SIGNIFICANT CHANGE UP
GLUCOSE SERPL-MCNC: 91 MG/DL — SIGNIFICANT CHANGE UP (ref 70–99)
HCT VFR BLD CALC: 28.3 % — LOW (ref 39–50)
HGB BLD-MCNC: 9.6 G/DL — LOW (ref 13–17)
MCHC RBC-ENTMCNC: 31.1 PG — SIGNIFICANT CHANGE UP (ref 27–34)
MCHC RBC-ENTMCNC: 33.9 GM/DL — SIGNIFICANT CHANGE UP (ref 32–36)
MCV RBC AUTO: 91.6 FL — SIGNIFICANT CHANGE UP (ref 80–100)
METHOD TYPE: SIGNIFICANT CHANGE UP
NRBC # BLD: 0 /100 WBCS — SIGNIFICANT CHANGE UP (ref 0–0)
NRBC BLD-RTO: 0 /100 WBCS — SIGNIFICANT CHANGE UP (ref 0–0)
ORGANISM # SPEC MICROSCOPIC CNT: ABNORMAL
ORGANISM # SPEC MICROSCOPIC CNT: SIGNIFICANT CHANGE UP
PLATELET # BLD AUTO: 156 K/UL — SIGNIFICANT CHANGE UP (ref 150–400)
POTASSIUM SERPL-MCNC: 3.4 MMOL/L — LOW (ref 3.5–5.3)
POTASSIUM SERPL-SCNC: 3.4 MMOL/L — LOW (ref 3.5–5.3)
RBC # BLD: 3.09 M/UL — LOW (ref 4.2–5.8)
RBC # FLD: 23.6 % — HIGH (ref 10.3–14.5)
SODIUM SERPL-SCNC: 143 MMOL/L — SIGNIFICANT CHANGE UP (ref 135–145)
SPECIMEN SOURCE: SIGNIFICANT CHANGE UP
WBC # BLD: 6.84 K/UL — SIGNIFICANT CHANGE UP (ref 3.8–10.5)
WBC # FLD AUTO: 6.84 K/UL — SIGNIFICANT CHANGE UP (ref 3.8–10.5)

## 2024-10-12 PROCEDURE — 99497 ADVNCD CARE PLAN 30 MIN: CPT | Mod: 25

## 2024-10-12 PROCEDURE — 99239 HOSP IP/OBS DSCHRG MGMT >30: CPT

## 2024-10-12 RX ORDER — METOPROLOL SUCCINATE 50 MG/1
0.5 TABLET, EXTENDED RELEASE ORAL
Qty: 0 | Refills: 0 | DISCHARGE
Start: 2024-10-12

## 2024-10-12 RX ORDER — ACETAMINOPHEN 500 MG/5ML
2 LIQUID (ML) ORAL
Qty: 0 | Refills: 0 | DISCHARGE
Start: 2024-10-12

## 2024-10-12 RX ADMIN — Medication 1 DROP(S): at 05:43

## 2024-10-12 RX ADMIN — Medication 40 MILLIGRAM(S): at 17:08

## 2024-10-12 RX ADMIN — Medication 40 MILLIEQUIVALENT(S): at 09:09

## 2024-10-12 RX ADMIN — Medication 1 DROP(S): at 22:03

## 2024-10-12 RX ADMIN — LOSARTAN POTASSIUM 100 MILLIGRAM(S): 100 TABLET, FILM COATED ORAL at 05:43

## 2024-10-12 RX ADMIN — TAMSULOSIN HYDROCHLORIDE 0.4 MILLIGRAM(S): 0.4 CAPSULE ORAL at 21:23

## 2024-10-12 RX ADMIN — Medication 40 MILLIGRAM(S): at 05:43

## 2024-10-12 RX ADMIN — SERTRALINE 100 MILLIGRAM(S): 100 TABLET, FILM COATED ORAL at 12:15

## 2024-10-12 RX ADMIN — METOPROLOL SUCCINATE 12.5 MILLIGRAM(S): 50 TABLET, EXTENDED RELEASE ORAL at 17:08

## 2024-10-12 RX ADMIN — METOPROLOL SUCCINATE 12.5 MILLIGRAM(S): 50 TABLET, EXTENDED RELEASE ORAL at 05:43

## 2024-10-12 RX ADMIN — Medication 325 MILLIGRAM(S): at 12:15

## 2024-10-13 VITALS
OXYGEN SATURATION: 96 % | SYSTOLIC BLOOD PRESSURE: 171 MMHG | DIASTOLIC BLOOD PRESSURE: 66 MMHG | RESPIRATION RATE: 18 BRPM | HEART RATE: 58 BPM | TEMPERATURE: 98 F

## 2024-10-13 PROCEDURE — 99233 SBSQ HOSP IP/OBS HIGH 50: CPT

## 2024-10-13 RX ADMIN — LOSARTAN POTASSIUM 100 MILLIGRAM(S): 100 TABLET, FILM COATED ORAL at 05:53

## 2024-10-13 RX ADMIN — Medication 40 MILLIGRAM(S): at 05:52

## 2024-10-13 RX ADMIN — Medication 1 DROP(S): at 05:52

## 2024-10-13 RX ADMIN — METOPROLOL SUCCINATE 12.5 MILLIGRAM(S): 50 TABLET, EXTENDED RELEASE ORAL at 05:53

## 2024-10-15 PROCEDURE — 84100 ASSAY OF PHOSPHORUS: CPT

## 2024-10-15 PROCEDURE — 83605 ASSAY OF LACTIC ACID: CPT

## 2024-10-15 PROCEDURE — 87637 SARSCOV2&INF A&B&RSV AMP PRB: CPT

## 2024-10-15 PROCEDURE — 36415 COLL VENOUS BLD VENIPUNCTURE: CPT

## 2024-10-15 PROCEDURE — 85027 COMPLETE CBC AUTOMATED: CPT

## 2024-10-15 PROCEDURE — 80048 BASIC METABOLIC PNL TOTAL CA: CPT

## 2024-10-15 PROCEDURE — 87040 BLOOD CULTURE FOR BACTERIA: CPT

## 2024-10-15 PROCEDURE — 80053 COMPREHEN METABOLIC PANEL: CPT

## 2024-10-15 PROCEDURE — 70450 CT HEAD/BRAIN W/O DYE: CPT | Mod: MC

## 2024-10-15 PROCEDURE — 84484 ASSAY OF TROPONIN QUANT: CPT

## 2024-10-15 PROCEDURE — 85025 COMPLETE CBC W/AUTO DIFF WBC: CPT

## 2024-10-15 PROCEDURE — 81001 URINALYSIS AUTO W/SCOPE: CPT

## 2024-10-15 PROCEDURE — 96374 THER/PROPH/DIAG INJ IV PUSH: CPT

## 2024-10-15 PROCEDURE — 93005 ELECTROCARDIOGRAM TRACING: CPT

## 2024-10-15 PROCEDURE — 97110 THERAPEUTIC EXERCISES: CPT

## 2024-10-15 PROCEDURE — 71250 CT THORAX DX C-: CPT | Mod: MC

## 2024-10-15 PROCEDURE — 82565 ASSAY OF CREATININE: CPT

## 2024-10-15 PROCEDURE — 85610 PROTHROMBIN TIME: CPT

## 2024-10-15 PROCEDURE — 83735 ASSAY OF MAGNESIUM: CPT

## 2024-10-15 PROCEDURE — 80076 HEPATIC FUNCTION PANEL: CPT

## 2024-10-15 PROCEDURE — 71045 X-RAY EXAM CHEST 1 VIEW: CPT

## 2024-10-15 PROCEDURE — 97162 PT EVAL MOD COMPLEX 30 MIN: CPT

## 2024-10-15 PROCEDURE — 97535 SELF CARE MNGMENT TRAINING: CPT

## 2024-10-15 PROCEDURE — 87086 URINE CULTURE/COLONY COUNT: CPT

## 2024-10-15 PROCEDURE — 85730 THROMBOPLASTIN TIME PARTIAL: CPT

## 2024-10-15 PROCEDURE — 99285 EMERGENCY DEPT VISIT HI MDM: CPT

## 2024-10-23 ENCOUNTER — INPATIENT (INPATIENT)
Facility: HOSPITAL | Age: 89
LOS: 5 days | Discharge: HOSPICE | DRG: 379 | End: 2024-10-29
Attending: HOSPITALIST | Admitting: STUDENT IN AN ORGANIZED HEALTH CARE EDUCATION/TRAINING PROGRAM
Payer: MEDICARE

## 2024-10-23 VITALS
WEIGHT: 149.91 LBS | OXYGEN SATURATION: 100 % | SYSTOLIC BLOOD PRESSURE: 103 MMHG | HEIGHT: 63 IN | RESPIRATION RATE: 20 BRPM | TEMPERATURE: 98 F | HEART RATE: 84 BPM | DIASTOLIC BLOOD PRESSURE: 65 MMHG

## 2024-10-23 DIAGNOSIS — K92.2 GASTROINTESTINAL HEMORRHAGE, UNSPECIFIED: ICD-10-CM

## 2024-10-23 DIAGNOSIS — Z98.890 OTHER SPECIFIED POSTPROCEDURAL STATES: Chronic | ICD-10-CM

## 2024-10-23 DIAGNOSIS — Z98.0 INTESTINAL BYPASS AND ANASTOMOSIS STATUS: Chronic | ICD-10-CM

## 2024-10-23 LAB
ALBUMIN SERPL ELPH-MCNC: 2.3 G/DL — LOW (ref 3.3–5)
ALP SERPL-CCNC: 112 U/L — SIGNIFICANT CHANGE UP (ref 40–120)
ALT FLD-CCNC: 20 U/L — SIGNIFICANT CHANGE UP (ref 10–45)
ANION GAP SERPL CALC-SCNC: 7 MMOL/L — SIGNIFICANT CHANGE UP (ref 5–17)
APPEARANCE UR: CLEAR — SIGNIFICANT CHANGE UP
APTT BLD: 25.7 SEC — SIGNIFICANT CHANGE UP (ref 24.5–35.6)
AST SERPL-CCNC: 29 U/L — SIGNIFICANT CHANGE UP (ref 10–40)
BASOPHILS # BLD AUTO: 0.04 K/UL — SIGNIFICANT CHANGE UP (ref 0–0.2)
BASOPHILS NFR BLD AUTO: 0.7 % — SIGNIFICANT CHANGE UP (ref 0–2)
BILIRUB SERPL-MCNC: 1.5 MG/DL — HIGH (ref 0.2–1.2)
BILIRUB UR-MCNC: NEGATIVE — SIGNIFICANT CHANGE UP
BLD GP AB SCN SERPL QL: SIGNIFICANT CHANGE UP
BUN SERPL-MCNC: 29 MG/DL — HIGH (ref 7–23)
CALCIUM SERPL-MCNC: 7.4 MG/DL — LOW (ref 8.4–10.5)
CHLORIDE SERPL-SCNC: 113 MMOL/L — HIGH (ref 96–108)
CO2 SERPL-SCNC: 24 MMOL/L — SIGNIFICANT CHANGE UP (ref 22–31)
COLOR SPEC: YELLOW — SIGNIFICANT CHANGE UP
CREAT SERPL-MCNC: 1.05 MG/DL — SIGNIFICANT CHANGE UP (ref 0.5–1.3)
DIFF PNL FLD: NEGATIVE — SIGNIFICANT CHANGE UP
EGFR: 64 ML/MIN/1.73M2 — SIGNIFICANT CHANGE UP
EOSINOPHIL # BLD AUTO: 0 K/UL — SIGNIFICANT CHANGE UP (ref 0–0.5)
EOSINOPHIL NFR BLD AUTO: 0 % — SIGNIFICANT CHANGE UP (ref 0–6)
EPI CELLS # UR: PRESENT
GLUCOSE SERPL-MCNC: 89 MG/DL — SIGNIFICANT CHANGE UP (ref 70–99)
GLUCOSE UR QL: NEGATIVE MG/DL — SIGNIFICANT CHANGE UP
HCT VFR BLD CALC: 19.6 % — CRITICAL LOW (ref 39–50)
HCT VFR BLD CALC: 25.7 % — LOW (ref 39–50)
HGB BLD-MCNC: 6.2 G/DL — CRITICAL LOW (ref 13–17)
HGB BLD-MCNC: 8.5 G/DL — LOW (ref 13–17)
IMM GRANULOCYTES NFR BLD AUTO: 0.7 % — SIGNIFICANT CHANGE UP (ref 0–0.9)
INR BLD: 1.68 RATIO — HIGH (ref 0.85–1.16)
KETONES UR-MCNC: NEGATIVE MG/DL — SIGNIFICANT CHANGE UP
LACTATE SERPL-SCNC: 1.5 MMOL/L — SIGNIFICANT CHANGE UP (ref 0.7–2)
LEUKOCYTE ESTERASE UR-ACNC: ABNORMAL
LIDOCAIN IGE QN: 11 U/L — LOW (ref 16–77)
LYMPHOCYTES # BLD AUTO: 1.42 K/UL — SIGNIFICANT CHANGE UP (ref 1–3.3)
LYMPHOCYTES # BLD AUTO: 26.6 % — SIGNIFICANT CHANGE UP (ref 13–44)
MCHC RBC-ENTMCNC: 30.4 PG — SIGNIFICANT CHANGE UP (ref 27–34)
MCHC RBC-ENTMCNC: 31.6 GM/DL — LOW (ref 32–36)
MCHC RBC-ENTMCNC: 31.6 PG — SIGNIFICANT CHANGE UP (ref 27–34)
MCHC RBC-ENTMCNC: 33.1 GM/DL — SIGNIFICANT CHANGE UP (ref 32–36)
MCV RBC AUTO: 100 FL — SIGNIFICANT CHANGE UP (ref 80–100)
MCV RBC AUTO: 91.8 FL — SIGNIFICANT CHANGE UP (ref 80–100)
MONOCYTES # BLD AUTO: 0.61 K/UL — SIGNIFICANT CHANGE UP (ref 0–0.9)
MONOCYTES NFR BLD AUTO: 11.4 % — SIGNIFICANT CHANGE UP (ref 2–14)
NEUTROPHILS # BLD AUTO: 3.23 K/UL — SIGNIFICANT CHANGE UP (ref 1.8–7.4)
NEUTROPHILS NFR BLD AUTO: 60.6 % — SIGNIFICANT CHANGE UP (ref 43–77)
NITRITE UR-MCNC: NEGATIVE — SIGNIFICANT CHANGE UP
NRBC # BLD: 0 /100 WBCS — SIGNIFICANT CHANGE UP (ref 0–0)
OB PNL STL: POSITIVE
PH UR: 5.5 — SIGNIFICANT CHANGE UP (ref 5–8)
PLATELET # BLD AUTO: 111 K/UL — LOW (ref 150–400)
PLATELET # BLD AUTO: 157 K/UL — SIGNIFICANT CHANGE UP (ref 150–400)
POTASSIUM SERPL-MCNC: 4.6 MMOL/L — SIGNIFICANT CHANGE UP (ref 3.5–5.3)
POTASSIUM SERPL-SCNC: 4.6 MMOL/L — SIGNIFICANT CHANGE UP (ref 3.5–5.3)
PROT SERPL-MCNC: 4.4 G/DL — LOW (ref 6–8.3)
PROT UR-MCNC: NEGATIVE MG/DL — SIGNIFICANT CHANGE UP
PROTHROM AB SERPL-ACNC: 19.7 SEC — HIGH (ref 9.9–13.4)
RBC # BLD: 1.96 M/UL — LOW (ref 4.2–5.8)
RBC # BLD: 2.8 M/UL — LOW (ref 4.2–5.8)
RBC # FLD: 21 % — HIGH (ref 10.3–14.5)
RBC # FLD: 24.2 % — HIGH (ref 10.3–14.5)
RBC CASTS # UR COMP ASSIST: 1 /HPF — SIGNIFICANT CHANGE UP (ref 0–4)
SODIUM SERPL-SCNC: 144 MMOL/L — SIGNIFICANT CHANGE UP (ref 135–145)
SP GR SPEC: 1.02 — SIGNIFICANT CHANGE UP (ref 1–1.03)
UROBILINOGEN FLD QL: 1 MG/DL — SIGNIFICANT CHANGE UP (ref 0.2–1)
WBC # BLD: 4.35 K/UL — SIGNIFICANT CHANGE UP (ref 3.8–10.5)
WBC # BLD: 5.34 K/UL — SIGNIFICANT CHANGE UP (ref 3.8–10.5)
WBC # FLD AUTO: 4.35 K/UL — SIGNIFICANT CHANGE UP (ref 3.8–10.5)
WBC # FLD AUTO: 5.34 K/UL — SIGNIFICANT CHANGE UP (ref 3.8–10.5)
WBC UR QL: 1 /HPF — SIGNIFICANT CHANGE UP (ref 0–5)

## 2024-10-23 PROCEDURE — 74174 CTA ABD&PLVS W/CONTRAST: CPT | Mod: 26

## 2024-10-23 PROCEDURE — 71045 X-RAY EXAM CHEST 1 VIEW: CPT | Mod: 26

## 2024-10-23 PROCEDURE — 70450 CT HEAD/BRAIN W/O DYE: CPT | Mod: 26

## 2024-10-23 PROCEDURE — 93010 ELECTROCARDIOGRAM REPORT: CPT

## 2024-10-23 PROCEDURE — 99223 1ST HOSP IP/OBS HIGH 75: CPT

## 2024-10-23 PROCEDURE — 99497 ADVNCD CARE PLAN 30 MIN: CPT | Mod: 25

## 2024-10-23 PROCEDURE — 99223 1ST HOSP IP/OBS HIGH 75: CPT | Mod: FS

## 2024-10-23 PROCEDURE — 99291 CRITICAL CARE FIRST HOUR: CPT

## 2024-10-23 RX ORDER — TAMSULOSIN HCL 0.4 MG
0.4 CAPSULE ORAL AT BEDTIME
Refills: 0 | Status: DISCONTINUED | OUTPATIENT
Start: 2024-10-23 | End: 2024-10-28

## 2024-10-23 RX ORDER — MELATONIN 5 MG
3 TABLET ORAL AT BEDTIME
Refills: 0 | Status: DISCONTINUED | OUTPATIENT
Start: 2024-10-23 | End: 2024-10-28

## 2024-10-23 RX ORDER — PANTOPRAZOLE SODIUM 40 MG/1
40 TABLET, DELAYED RELEASE ORAL EVERY 12 HOURS
Refills: 0 | Status: DISCONTINUED | OUTPATIENT
Start: 2024-10-23 | End: 2024-10-28

## 2024-10-23 RX ORDER — SERTRALINE HYDROCHLORIDE 50 MG/1
100 TABLET, FILM COATED ORAL DAILY
Refills: 0 | Status: DISCONTINUED | OUTPATIENT
Start: 2024-10-23 | End: 2024-10-28

## 2024-10-23 RX ORDER — FERROUS SULFATE 325(65) MG
325 TABLET ORAL DAILY
Refills: 0 | Status: DISCONTINUED | OUTPATIENT
Start: 2024-10-23 | End: 2024-10-28

## 2024-10-23 RX ORDER — ACETAMINOPHEN 500 MG
650 TABLET ORAL EVERY 6 HOURS
Refills: 0 | Status: DISCONTINUED | OUTPATIENT
Start: 2024-10-23 | End: 2024-10-28

## 2024-10-23 RX ORDER — ONDANSETRON HYDROCHLORIDE 2 MG/ML
4 INJECTION, SOLUTION INTRAMUSCULAR; INTRAVENOUS EVERY 8 HOURS
Refills: 0 | Status: DISCONTINUED | OUTPATIENT
Start: 2024-10-23 | End: 2024-10-29

## 2024-10-23 RX ORDER — MAGNESIUM, ALUMINUM HYDROXIDE 200-200 MG
30 TABLET,CHEWABLE ORAL EVERY 4 HOURS
Refills: 0 | Status: DISCONTINUED | OUTPATIENT
Start: 2024-10-23 | End: 2024-10-28

## 2024-10-23 RX ADMIN — Medication 325 MILLIGRAM(S): at 12:12

## 2024-10-23 RX ADMIN — Medication 0.4 MILLIGRAM(S): at 21:21

## 2024-10-23 RX ADMIN — PANTOPRAZOLE SODIUM 40 MILLIGRAM(S): 40 TABLET, DELAYED RELEASE ORAL at 12:12

## 2024-10-23 RX ADMIN — SERTRALINE HYDROCHLORIDE 100 MILLIGRAM(S): 50 TABLET, FILM COATED ORAL at 12:12

## 2024-10-23 RX ADMIN — PANTOPRAZOLE SODIUM 40 MILLIGRAM(S): 40 TABLET, DELAYED RELEASE ORAL at 17:04

## 2024-10-23 NOTE — PATIENT PROFILE ADULT - TOBACCO USE
Isolate at home until the COVID-19 swab results. This usually takes 1-2 days. The quickest way to see this result is online in your MyChart.    Use over-the-counter Tylenol & ibuprofen as needed for any fever or discomfort.    Continue to drink plenty of fluids to stay hydrated. You have been doing a good job with this.    Follow up with your Primary Care provider in 2 days for a recheck.    Return to the Emergency Department for any difficulty breathing, persistent vomiting, or any other concerns.  
Unknown if ever smoked

## 2024-10-23 NOTE — CONSULT NOTE ADULT - SUBJECTIVE AND OBJECTIVE BOX
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   Recent hospitalization two weeks ago for low hemoglobin requiring 2unit  RBC transfusion. Pt. experienced dark black stool upon arrival to ED - sent to occult stool. Pt. is afebrile, VSS, alert and oriented. Pt. denies recent falls.    pt unable to provide history.  spoke to son, who reports that pt was living at home prior to last hospitalization for covid and GI bleed, ambulates with walker, is able to communicate with full sentences  last admission it was noted that he had slurred speech which resolved after PRB'S, pt noted with slurred speech again today. head ct ordered  was seen by GI lasr admission and decided against endoscopy.    ED course- occult positive , receiving 2 u prbs.  GI  consulted.   son and dtr would like to speak to GI re: endoscopy at this time.   dtr is HCP,  DNR/DNI,    (23 Oct 2024 10:24)    GI consulted to see patient due to GIB, patient known from previous admission. Patient noted to have hematemesis at facility and melena with clots in ER. HPI obtained from patient daughter at bedside, staff and records. No recent EGS or colonoscopy reported.     MEDICATIONS  (STANDING):  ferrous    sulfate 325 milliGRAM(s) Oral daily  pantoprazole  Injectable 40 milliGRAM(s) IV Push every 12 hours  sertraline 100 milliGRAM(s) Oral daily  tamsulosin 0.4 milliGRAM(s) Oral at bedtime    MEDICATIONS  (PRN):  acetaminophen     Tablet .. 650 milliGRAM(s) Oral every 6 hours PRN Temp greater or equal to 38C (100.4F), Mild Pain (1 - 3)  aluminum hydroxide/magnesium hydroxide/simethicone Suspension 30 milliLiter(s) Oral every 4 hours PRN Dyspepsia  melatonin 3 milliGRAM(s) Oral at bedtime PRN Insomnia  ondansetron Injectable 4 milliGRAM(s) IV Push every 8 hours PRN Nausea and/or Vomiting      Allergies    No Known Allergies    Intolerances        PAST MEDICAL & SURGICAL HISTORY:  Hypertension      Anemia      Alzheimer disease      Myelodysplastic syndrome      H/O [place] II operation      History of bone marrow biopsy          REVIEW OF SYSTEMS: unable to obtain       PHYSICAL EXAM:   Vital Signs:  Vital Signs Last 24 Hrs  T(C): 36.4 (23 Oct 2024 12:00), Max: 36.5 (23 Oct 2024 07:24)  T(F): 97.5 (23 Oct 2024 12:00), Max: 97.7 (23 Oct 2024 07:24)  HR: 77 (23 Oct 2024 12:00) (75 - 84)  BP: 133/61 (23 Oct 2024 12:00) (92/44 - 133/61)  BP(mean): --  RR: 18 (23 Oct 2024 12:00) (16 - 21)  SpO2: 100% (23 Oct 2024 12:00) (100% - 100%)    Parameters below as of 23 Oct 2024 12:00  Patient On (Oxygen Delivery Method): room air      Daily Height in cm: 160.02 (23 Oct 2024 07:24)    Daily I&O's Summary      GENERAL:  Appears stated age,  HEENT:  NC/AT,  conjunctivae clear and pink,   CHEST:  Full & symmetric excursion, no increased effort, breath sounds clear  HEART:  Regular rhythm, S1, S2, no murmur  ABDOMEN:  Soft, non-tender, non-distended, normoactive bowel sounds,  EXTEREMITIES:  no edema  SKIN:  No rash/warm/dry  NEURO:  Alert, Confused       LABS:                        6.2    5.34  )-----------( 157      ( 23 Oct 2024 07:54 )             19.6     10-23    144  |  113[H]  |  29[H]  ----------------------------<  89  4.6   |  24  |  1.05    Ca    7.4[L]      23 Oct 2024 07:54    TPro  4.4[L]  /  Alb  2.3[L]  /  TBili  1.5[H]  /  DBili  x   /  AST  29  /  ALT  20  /  AlkPhos  112  10-23    PT/INR - ( 23 Oct 2024 07:54 )   PT: 19.7 sec;   INR: 1.68 ratio         PTT - ( 23 Oct 2024 07:54 )  PTT:25.7 sec  Urinalysis Basic - ( 23 Oct 2024 10:35 )    Color: Yellow / Appearance: Clear / S.019 / pH: x  Gluc: x / Ketone: Negative mg/dL  / Bili: Negative / Urobili: 1.0 mg/dL   Blood: x / Protein: Negative mg/dL / Nitrite: Negative   Leuk Esterase: Trace / RBC: 1 /HPF / WBC 1 /HPF   Sq Epi: x / Non Sq Epi: x / Bacteria: x      amylase   lipaseLipase: 11 U/L (10-23 @ 07:54)    RADIOLOGY & ADDITIONAL TESTS:

## 2024-10-23 NOTE — ED PROVIDER NOTE - OBJECTIVE STATEMENT
Pt. is a 89-year-old male with history of MDS, HTN, [place], Alzheimer's, depression, s/p TAVR on plavix, BIBA with hemoptysis this morning at the rehab prompted Hbg check of 5.5. Recent hospitalization two weeks ago for low hemoglobin requiring 2unit  RBC transfusion. Pt. came from [facility] accompanied with son. Pt. experienced dark black stool upon arrival to ED - sent to occult stool. Pt. is afebrile, VSS, alert and oriented. Pt. denies recent falls.

## 2024-10-23 NOTE — SPEECH LANGUAGE PATHOLOGY EVALUATION - COMMENTS
impaired at baseline, did not follow directives for specific task completion suspect due to baseline cognition/acute illness/deflecting with humor WBC 10/23 5.34  CTH 10/23 IMPRESSION:  Mild chronic microvascular changes without evidence of an   acute transcortical infarction or hemorrhage.    Patient known to this service from multiple previous admissions in recent weeks, followed for dysphagia tx. Known hx of dementia, not oriented at baseline, pleasantly confused. Speech production/mentation fluctuates at baseline and with acute illness reduced articulatory precision consistent with baseline when acutely ill, able to make simple needs known at this time

## 2024-10-23 NOTE — ED ADULT NURSE NOTE - NSFALLHARMRISKINTERV_ED_ALL_ED

## 2024-10-23 NOTE — PATIENT PROFILE ADULT - LIVING ENVIRONMENT
Refill Routing Note   Medication(s) are not appropriate for processing by [facility] for the following reason(s):      - Required vitals are abnormal    [facility] action(s):  Defer       Medication Therapy Plan: Last BP 02/24/22 (!) 113/44  Medication reconciliation completed: No     Appointments  past 12m or future 3m with PCP    Date Provider   Last Visit   2/10/2022 Bin Whitten MD   Next Visit   6/16/2022 Bin Whitten MD   ED visits in past 90 days: 0        Note composed:11:12 AM 06/07/2022            no

## 2024-10-23 NOTE — H&P ADULT - NSHPLABSRESULTS_GEN_ALL_CORE
6.2                  144  | 24   | 29           5.34  >-----------< 157     ------------------------< 89                    19.6                 4.6  | 113  | 1.05                                         Ca 7.4   Mg x     Ph x        Occult Blood, Feces: Positive (10.23.24 @ 07:54)    Urinalysis (10.23.24 @ 10:35)    pH Urine: 5.5    Glucose Qualitative, Urine: Negative mg/dL    Blood, Urine: Negative    Color: Yellow    Urine Appearance: Clear    Bilirubin: Negative    Ketone - Urine: Negative mg/dL    Specific Gravity: 1.019    Protein, Urine: Negative mg/dL    Urobilinogen: 1.0 mg/dL    Nitrite: Negative    Leukocyte Esterase Concentration: Trace    ABO RH Interpretation: O POS (10.23.24 @ 07:54)    Labs reviewed:     CXR personally reviewed: napd    ECG reviewed and interpreted: sinus at 86

## 2024-10-23 NOTE — CONSULT NOTE ADULT - PROBLEM SELECTOR RECOMMENDATION 9
Lengthy discussion had with daughter at bedside, she understands EGD at this time would be high risk. Risk/benefit reviewed at length  CTA for now to check for active bleed/mass  NPO  Keep active type and screen  Transfuse 2 units PRBCS  PPI IV BID  Hold plavix for now  Possible EGD Friday pending CTA results and overall clinical status  Cardiac clearance requested

## 2024-10-23 NOTE — H&P ADULT - HISTORY OF PRESENT ILLNESS
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   Recent hospitalization two weeks ago for low hemoglobin requiring 2unit  RBC transfusion. Pt. experienced dark black stool upon arrival to ED - sent to occult stool. Pt. is afebrile, VSS, alert and oriented. Pt. denies recent falls.    pt unable to provide history.  spoke to son, who reports that pt was living at home prior to last hospitalization for covid and GI bleed, ambulates with walker, is able to communicate with full sentences  last admission it was noted that he had slurred speech which resolved after PRB'S, pt noted with slurred speech again today. head ct ordered  was seen by GI lasr admission and decided against endoscopy.    ED course- occult positive , receiving 2 u prbs.  GI  consulted.   son would like to speak to GI re: endoscopy at this time.   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   Recent hospitalization two weeks ago for low hemoglobin requiring 2unit  RBC transfusion. Pt. experienced dark black stool upon arrival to ED - sent to occult stool. Pt. is afebrile, VSS, alert and oriented. Pt. denies recent falls.    pt unable to provide history.  spoke to son, who reports that pt was living at home prior to last hospitalization for covid and GI bleed, ambulates with walker, is able to communicate with full sentences  last admission it was noted that he had slurred speech which resolved after PRB'S, pt noted with slurred speech again today. head ct ordered  was seen by GI lasr admission and decided against endoscopy.    ED course- occult positive , receiving 2 u prbs.  GI  consulted.   son and dtr would like to speak to GI re: endoscopy at this time.   dtr is HCP,  DNR/DNI,

## 2024-10-23 NOTE — SPEECH LANGUAGE PATHOLOGY EVALUATION - SLP DIAGNOSIS
At this time, patient presenting with baseline impaired language, speech production and cognition. Able to make simple needs known at this time however, staff to anticipate most needs. Reduced participation in structured evaluation tasks on this date suspect impacted by medical acuity, baseline deficits and baseline reduced hearing acuity.  Patient noted to deflect with humor, which is baseline per private aide at bedside. Reduced articulatory precision intermittently without known context. At this time, patient at his baseline level of function at this time, anticipate some improvement in mental status and speech production once more medically stable. Patient does not require skilled SLP intervention at this time

## 2024-10-23 NOTE — ED ADULT TRIAGE NOTE - CHIEF COMPLAINT QUOTE
Pt BIB [facility] for BRB vomit, Hg 5.7 from [place]. Pt is DNI/DNR.  Pt is on continuous 3 liter nasal cannula at baseline. Pt has dementia.

## 2024-10-23 NOTE — ED PROVIDER NOTE - PHYSICAL EXAMINATION
VITAL SIGNS: I have reviewed nursing notes and confirm.  CONSTITUTIONAL: well-appearing, non-toxic, NAD  SKIN: Warm dry, poor skin turgor, generalized ecchymosis  HEAD: NCAT  EYES: EOMI, PERRLA, no scleral icterus  ENT: Moist mucous membranes, normal pharynx with no erythema or exudates. [place].  NECK: Supple; non tender. Full ROM. No cervical LAD  CARD: RRR, no murmurs, rubs or gallops  RESP: Clear to ausculation b/l.  No rales, rhonchi, or wheezing.  ABD: soft, decreased BS, non-tender, non-distended, no rebound or guarding. No CVA tenderness. Large black soft stool observed.  EXT: Full ROM, no bony tenderness, no pedal edema, no calf tenderness  NEURO: normal motor. normal sensory. CN II-XII intact. Cerebellar testing normal. Normal gait.  PSYCH: Cooperative, appropriate. VITAL SIGNS: I have reviewed nursing notes and confirm.  CONSTITUTIONAL: well-appearing, non-toxic, NAD  SKIN: Warm dry, poor skin turgor  HEAD: NCAT  CARD: RRR, no murmurs, rubs or gallops  RESP: Clear to ausculation b/l.  No rales, rhonchi, or wheezing.  ABD: soft, decreased BS, non-tender, non-distended, no rebound or guarding. No CVA tenderness. Large black soft stool observed.  NEURO: normal motor. normal sensory.   PSYCH: Cooperative, appropriate.

## 2024-10-23 NOTE — ED PROVIDER NOTE - CLINICAL SUMMARY MEDICAL DECISION MAKING FREE TEXT BOX
patient w/ GI bleed - melanotic stool noted in the ED, consulted Gastroenterology spoke Karma Kelly - will evaluate - son at this time would like to hold Plavix and give PRBC for GI bleed, hold off on any further interventions such as endoscopy given his comorbidities and age, risk of sedation. S/o to hospitalist Dr. Alcaraz

## 2024-10-23 NOTE — ED ADULT NURSE REASSESSMENT NOTE - NS ED NURSE REASSESS COMMENT FT1
Patient straight cath inserted for urine using sterile technique. Second RN present to confirm sterility. Explained procedure as it was being done - Patient tolerated procedure well. Sterile specimens collected and sent to lab as ordered. Drained approximately 50ml of urine. Comfort and safety provided.

## 2024-10-23 NOTE — ED ADULT NURSE NOTE - OBJECTIVE STATEMENT
89 year old Male comes to ER via EMS from [facility] for low hemoglobin, 5.7. Patient on Plavix for cardiac valve. Patient DNR/DNI, son at bedside. Patient A&Ox2 to self and place. Patient denies pain at this time. Patient is drowsy but able to stay awake enough to answer questions. Patient saturated, diaper changed, large amounts of blood clots found in diaper with urine. Skin red around sacrum, nonblanchable. Patient has swelling to right upper extremity worse than bilateral lower leg swelling. Saturating 100% on room air, breathing spontaneous and unlabored, palpable pulses. Bed locked and in lowest position for safety.

## 2024-10-23 NOTE — CONSULT NOTE ADULT - NS ATTEND AMEND GEN_ALL_CORE FT
I attest my time as attending was greater than 50% of the total time of 75 min on patient care on this DOS. Time spent includes review of hospital course, labs, vitals, medical records, patient evaluation, contact with family (when present), discussion of plan with the primary team, coordinating services and documenting encounter.     No active bleeding on CTA

## 2024-10-23 NOTE — PATIENT PROFILE ADULT - FALL HARM RISK - FALLEN IN PAST
Accidental fall Detail Level: Detailed Quality 137: Melanoma: Continuity Of Care - Recall System: Patient information entered into a recall system that includes: target date for the next exam specified AND a process to follow up with patients regarding missed or unscheduled appointments Quality 265: Biopsy Follow-Up: Biopsy results reviewed, communicated, tracked, and documented

## 2024-10-23 NOTE — SPEECH LANGUAGE PATHOLOGY EVALUATION - SLP PERTINENT HISTORY OF CURRENT PROBLEM
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

## 2024-10-23 NOTE — SPEECH LANGUAGE PATHOLOGY EVALUATION - SLP GENERAL OBSERVATIONS
Patient received bedside asleep with private aide present. Reported prior to evaluation, patient was alert, speaking with her and at his baseline. HHA in agreement that when acutely ill, mental status and speech production fluctuate. Patient awakened when name called, alert and verbal throughout, making jokes, pleasantly confused.

## 2024-10-23 NOTE — CONSULT NOTE ADULT - ASSESSMENT
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   Recent hospitalization two weeks ago for low hemoglobin requiring 2unit  RBC transfusion. Pt. experienced dark black stool upon arrival to ED - sent to occult stool. Pt. is afebrile, VSS, alert and oriented. Pt. denies recent falls.    pt unable to provide history.  spoke to son, who reports that pt was living at home prior to last hospitalization for covid and GI bleed, ambulates with walker, is able to communicate with full sentences  last admission it was noted that he had slurred speech which resolved after PRB'S, pt noted with slurred speech again today. head ct ordered  was seen by GI lasr admission and decided against endoscopy.    ED course- occult positive , receiving 2 u prbs.  GI  consulted.   son and dtr would like to speak to GI re: endoscopy at this time.   dtr is HCP,  DNR/DNI,    (23 Oct 2024 10:24)    GI consulted to see patient due to GIB, patient known from previous admission. Patient noted to have hematemesis at facility and melena with clots in ER. HPI obtained from patient daughter at bedside, staff and records. No recent EGS or colonoscopy reported.  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   Recent hospitalization two weeks ago for low hemoglobin requiring 2unit  RBC transfusion. Pt. experienced dark black stool upon arrival to ED - sent to occult stool. Pt. is afebrile, VSS, alert and oriented. Pt. denies recent falls.    pt unable to provide history.  spoke to son, who reports that pt was living at home prior to last hospitalization for covid and GI bleed, ambulates with walker, is able to communicate with full sentences  last admission it was noted that he had slurred speech which resolved after PRB'S, pt noted with slurred speech again today. head ct ordered  was seen by GI last admission and decided against endoscopy.    ED course- occult positive , receiving 2 u prbs.  GI  consulted.   son and dtr would like to speak to GI re: endoscopy at this time.   dtr is HCP,  DNR/DNI,    (23 Oct 2024 10:24)    GI consulted to see patient due to GIB, patient known from previous admission. Patient noted to have hematemesis at facility and melena with clots in ER. HPI obtained from patient daughter at bedside, staff and records. No recent EGS or colonoscopy reported.

## 2024-10-23 NOTE — H&P ADULT - NSHPPHYSICALEXAM_GEN_ALL_CORE
Vital Signs Last 24 Hrs  T(C): 36.3 (23 Oct 2024 09:55), Max: 36.5 (23 Oct 2024 07:24)  T(F): 97.3 (23 Oct 2024 09:55), Max: 97.7 (23 Oct 2024 07:24)  HR: 76 (23 Oct 2024 09:55) (76 - 84)  BP: 133/52 (23 Oct 2024 09:55) (92/44 - 133/52)  BP(mean): --  RR: 19 (23 Oct 2024 09:55) (19 - 21)  SpO2: 100% (23 Oct 2024 09:55) (100% - 100%)    Parameters below as of 23 Oct 2024 09:55  Patient On (Oxygen Delivery Method): room air    GENERAL- NAD  EAR/NOSE/MOUTH/THROAT -  MMM  EYES- [place], conjunctiva and Sclera clear  NECK- supple  RESPIRATORY-  clear to auscultation bilaterally  CARDIOVASCULAR - SIS2, RRR  GI - soft NT BS present  EXTREMITIES- no pedal edema  NEUROLOGY- no gross focal deficits, garbled speech   PSYCHIATRY- AAO X 0

## 2024-10-23 NOTE — ED PROVIDER NOTE - PATIENT'S PREFERRED PRONOUN
Clinical Nutrition -  Parenteral Nutrition   TPN ordered for tonight and will provide 900 dextrose calories, 540 lipid calories, 30% lipids, 90 grams protein in 2400 ml fluid to meet ~100% pt nutritional  needs.  Pt may be at refeeding risk, therefore appro Him/He

## 2024-10-23 NOTE — H&P ADULT - ASSESSMENT
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   Recent hospitalization two weeks ago for low hemoglobin requiring 2unit  RBC transfusion. Pt. experienced dark black stool upon arrival to ED - sent to occult stool. Pt. is afebrile, VSS, alert and oriented. Pt. denies recent falls.  pt unable to provide history. spoke to son, who reports that pt was living at home prior to last hospitalization for covid and GI bleed, ambulates with walker, is able to communicate with full sentences  last admission it was noted that he had slurred speech which resolved after PRB'S, pt noted with slurred speech again today. head ct ordered  was seen by GI lasr admission and decided against endoscopy.      # hemoptysis, dark stool in ED, GI bleed  - ED course- occult positive , hb6.2  receiving 2 u prbs.  GI  consulted.   - son would like to speak to GI re: endoscopy at this time.   - admit o medicine  - npo  - iv fluids  - hold Plavix  - ppi 40iv q12  - am labs  - repeat hb 4 hr after 2nd unit  - dr. faustin aware  - dtr is HCP, pt is DNR/DNI, MOLST completed in ED      #Metabolic encephalopathy, lethargy, garbled speech - likely du to GI BLEED  head ct ordered  asp precautions  npo  iv fluids  monitor  pt has been receiving  Procrit by hematology dr. diallo      #Hypo-Albuminemia; Severe protein-calorie malnutrition   #Dysphagia  - Likely malnutrition and age related debility   - npo for now due charles GI bleed  - SLP consulted      #Hyper-Bili   - Could be hemolyzing   - Monitor  - GI to follow    #HTN  #TAVR   - BP boderline  - hold Metoprolol, losartan   - Holding Plavix for now     #BPH  - Cont Flomax     #Depression   - Sertraline     #DVT ppx  - SCDs given positive FOBT    GOC: DNR/I- MOLST completed    updated patient's daughter Laura at [phone] and son joshua 89-year-old male with history of MDS, HTN, [place], Alzheimer's, depression, s/p TAVR on Plavix,  BIBA from [facility], with hemoptysis this morning at the rehab prompted Hbg check of 5.5.   Recent hospitalization two weeks ago for low hemoglobin requiring 2unit  RBC transfusion. Pt. experienced dark black stool upon arrival to ED - sent to occult stool. Pt. is afebrile, VSS, alert and oriented. Pt. denies recent falls.  pt unable to provide history. spoke to son, who reports that pt was living at home prior to last hospitalization for covid and GI bleed, ambulates with walker, is able to communicate with full sentences  last admission it was noted that he had slurred speech which resolved after PRB'S, pt noted with slurred speech again today. head ct ordered  was seen by GI lasr admission and decided against endoscopy.      # hemoptysis, dark stool in ED, GI bleed  - ED course- occult positive , hb6.2  receiving 2 u prbs.    - GI  consulted- d/w ramin dias nd, dr- will order cta A/P, depending on the results will decide for endoscopy  - son would like to speak to GI re: endoscopy at this time.   - admit o medicine  - npo  - iv fluids  - hold Plavix  - ppi 40iv q12  - am labs  - repeat hb 4 hr after 2nd unit  - dr. faustin aware  - dtr is HCP, pt is DNR/DNI, MOLST completed in ED  - cardio consulted- for clearance for endoscopy       #Metabolic encephalopathy, lethargy, garbled speech - likely du to GI BLEED  head ct ordered  asp precautions  npo  iv fluids  monitor  pt has been receiving  Procrit by hematology dr. diallo      #Hypo-Albuminemia; Severe protein-calorie malnutrition   #Dysphagia  - Likely malnutrition and age related debility   - npo for now due charles GI bleed  - SLP consulted      #Hyper-Bili   - Could be hemolyzing   - Monitor  - GI to follow    #HTN  #TAVR   - BP boderline  - hold Metoprolol, losartan   - Holding Plavix for now     #BPH  - Cont Flomax     #Depression   - Sertraline     #DVT ppx  - SCDs given positive FOBT    GOC: DNR/I- MOLST completed    updated patient's daughter Laura at [phone] and son joshua 89-year-old male with history of MDS, HTN, [place], Alzheimer's, depression, s/p TAVR on Plavix,  BIBA from [facility], with hemoptysis this morning at the rehab prompted Hbg check of 5.5.   Recent hospitalization two weeks ago for low hemoglobin requiring 2unit  RBC transfusion. Pt. experienced dark black stool upon arrival to ED - sent to occult stool. Pt. is afebrile, VSS, alert and oriented. Pt. denies recent falls.  pt unable to provide history. spoke to son, who reports that pt was living at home prior to last hospitalization for covid and GI bleed, ambulates with walker, is able to communicate with full sentences  last admission it was noted that he had slurred speech which resolved after PRB'S, pt noted with slurred speech again today. head ct ordered  was seen by GI lasr admission and decided against endoscopy.      # hemoptysis, dark stool in ED, GI bleed  - ED course- occult positive , hb6.2  receiving 2 u prbs.    - GI  consulted- d/w ramin dias nd, dr- will order cta A/P, depending on the results will decide for endoscopy  - son would like to speak to GI re: endoscopy at this time.   - admit o medicine  - s/s eval, puree diet   - iv fluids  - hold Plavix  - ppi 40iv q12  - am labs  - repeat hb 4 hr after 2nd unit  - dr. faustin aware  - dtr is HCP, pt is DNR/DNI, MOLST completed in ED  - cardio consulted- for clearance for endoscopy       #Metabolic encephalopathy, lethargy, garbled speech - likely du to GI BLEED  head ct ordered  asp precautions  npo  iv fluids  monitor  pt has been receiving  Procrit by hematology dr. diallo      #Hypo-Albuminemia; Severe protein-calorie malnutrition   #Dysphagia  - Likely malnutrition and age related debility   - npo for now due charles GI bleed  - SLP consulted      #Hyper-Bili   - Could be hemolyzing   - Monitor  - GI to follow    #HTN  #TAVR   - BP boderline  - hold Metoprolol, losartan   - Holding Plavix for now     #BPH  - Cont Flomax     #Depression   - Sertraline     #DVT ppx  - SCDs given positive FOBT    GOC: DNR/I- MOLST completed    updated patient's daughter Laura at [phone] and son joshua

## 2024-10-24 LAB
ALBUMIN SERPL ELPH-MCNC: 2.3 G/DL — LOW (ref 3.3–5)
ALP SERPL-CCNC: 106 U/L — SIGNIFICANT CHANGE UP (ref 40–120)
ALT FLD-CCNC: 18 U/L — SIGNIFICANT CHANGE UP (ref 10–45)
ANION GAP SERPL CALC-SCNC: 8 MMOL/L — SIGNIFICANT CHANGE UP (ref 5–17)
ANISOCYTOSIS BLD QL: SIGNIFICANT CHANGE UP
AST SERPL-CCNC: 24 U/L — SIGNIFICANT CHANGE UP (ref 10–40)
BASOPHILS # BLD AUTO: 0 K/UL — SIGNIFICANT CHANGE UP (ref 0–0.2)
BASOPHILS NFR BLD AUTO: 0 % — SIGNIFICANT CHANGE UP (ref 0–2)
BILIRUB SERPL-MCNC: 2.4 MG/DL — HIGH (ref 0.2–1.2)
BUN SERPL-MCNC: 30 MG/DL — HIGH (ref 7–23)
BURR CELLS BLD QL SMEAR: PRESENT — SIGNIFICANT CHANGE UP
CALCIUM SERPL-MCNC: 7.7 MG/DL — LOW (ref 8.4–10.5)
CHLORIDE SERPL-SCNC: 115 MMOL/L — HIGH (ref 96–108)
CO2 SERPL-SCNC: 25 MMOL/L — SIGNIFICANT CHANGE UP (ref 22–31)
CREAT SERPL-MCNC: 0.95 MG/DL — SIGNIFICANT CHANGE UP (ref 0.5–1.3)
EGFR: 72 ML/MIN/1.73M2 — SIGNIFICANT CHANGE UP
EOSINOPHIL # BLD AUTO: 0.04 K/UL — SIGNIFICANT CHANGE UP (ref 0–0.5)
EOSINOPHIL NFR BLD AUTO: 1 % — SIGNIFICANT CHANGE UP (ref 0–6)
GLUCOSE SERPL-MCNC: 91 MG/DL — SIGNIFICANT CHANGE UP (ref 70–99)
HCT VFR BLD CALC: 22.9 % — LOW (ref 39–50)
HCT VFR BLD CALC: 23.5 % — LOW (ref 39–50)
HGB BLD-MCNC: 7.7 G/DL — LOW (ref 13–17)
HGB BLD-MCNC: 7.8 G/DL — LOW (ref 13–17)
HYPOCHROMIA BLD QL: SIGNIFICANT CHANGE UP
LYMPHOCYTES # BLD AUTO: 1.71 K/UL — SIGNIFICANT CHANGE UP (ref 1–3.3)
LYMPHOCYTES # BLD AUTO: 45 % — HIGH (ref 13–44)
MANUAL SMEAR VERIFICATION: SIGNIFICANT CHANGE UP
MCHC RBC-ENTMCNC: 31 PG — SIGNIFICANT CHANGE UP (ref 27–34)
MCHC RBC-ENTMCNC: 31 PG — SIGNIFICANT CHANGE UP (ref 27–34)
MCHC RBC-ENTMCNC: 33.2 GM/DL — SIGNIFICANT CHANGE UP (ref 32–36)
MCHC RBC-ENTMCNC: 33.6 GM/DL — SIGNIFICANT CHANGE UP (ref 32–36)
MCV RBC AUTO: 92.3 FL — SIGNIFICANT CHANGE UP (ref 80–100)
MCV RBC AUTO: 93.3 FL — SIGNIFICANT CHANGE UP (ref 80–100)
MONOCYTES # BLD AUTO: 0.61 K/UL — SIGNIFICANT CHANGE UP (ref 0–0.9)
MONOCYTES NFR BLD AUTO: 16 % — HIGH (ref 2–14)
NEUTROPHILS # BLD AUTO: 1.45 K/UL — LOW (ref 1.8–7.4)
NEUTROPHILS NFR BLD AUTO: 38 % — LOW (ref 43–77)
NRBC # BLD: 0 /100 WBCS — SIGNIFICANT CHANGE UP (ref 0–0)
NRBC # BLD: 0 /100 WBCS — SIGNIFICANT CHANGE UP (ref 0–0)
PLAT MORPH BLD: NORMAL — SIGNIFICANT CHANGE UP
PLATELET # BLD AUTO: 112 K/UL — LOW (ref 150–400)
PLATELET # BLD AUTO: 116 K/UL — LOW (ref 150–400)
POTASSIUM SERPL-MCNC: 4 MMOL/L — SIGNIFICANT CHANGE UP (ref 3.5–5.3)
POTASSIUM SERPL-SCNC: 4 MMOL/L — SIGNIFICANT CHANGE UP (ref 3.5–5.3)
PROT SERPL-MCNC: 4.3 G/DL — LOW (ref 6–8.3)
RBC # BLD: 2.48 M/UL — LOW (ref 4.2–5.8)
RBC # BLD: 2.52 M/UL — LOW (ref 4.2–5.8)
RBC # FLD: 21.3 % — HIGH (ref 10.3–14.5)
RBC # FLD: 21.3 % — HIGH (ref 10.3–14.5)
RBC BLD AUTO: ABNORMAL
SODIUM SERPL-SCNC: 148 MMOL/L — HIGH (ref 135–145)
WBC # BLD: 3.38 K/UL — LOW (ref 3.8–10.5)
WBC # BLD: 3.81 K/UL — SIGNIFICANT CHANGE UP (ref 3.8–10.5)
WBC # FLD AUTO: 3.38 K/UL — LOW (ref 3.8–10.5)
WBC # FLD AUTO: 3.81 K/UL — SIGNIFICANT CHANGE UP (ref 3.8–10.5)

## 2024-10-24 PROCEDURE — 99223 1ST HOSP IP/OBS HIGH 75: CPT

## 2024-10-24 PROCEDURE — 99222 1ST HOSP IP/OBS MODERATE 55: CPT | Mod: FS

## 2024-10-24 PROCEDURE — 99233 SBSQ HOSP IP/OBS HIGH 50: CPT

## 2024-10-24 PROCEDURE — 99233 SBSQ HOSP IP/OBS HIGH 50: CPT | Mod: FS

## 2024-10-24 RX ORDER — ACETAMINOPHEN 500 MG
1000 TABLET ORAL ONCE
Refills: 0 | Status: COMPLETED | OUTPATIENT
Start: 2024-10-24 | End: 2024-10-24

## 2024-10-24 RX ADMIN — SERTRALINE HYDROCHLORIDE 100 MILLIGRAM(S): 50 TABLET, FILM COATED ORAL at 11:47

## 2024-10-24 RX ADMIN — Medication 325 MILLIGRAM(S): at 11:47

## 2024-10-24 RX ADMIN — Medication 1000 MILLIGRAM(S): at 19:11

## 2024-10-24 RX ADMIN — PANTOPRAZOLE SODIUM 40 MILLIGRAM(S): 40 TABLET, DELAYED RELEASE ORAL at 17:17

## 2024-10-24 RX ADMIN — Medication 400 MILLIGRAM(S): at 18:31

## 2024-10-24 RX ADMIN — PANTOPRAZOLE SODIUM 40 MILLIGRAM(S): 40 TABLET, DELAYED RELEASE ORAL at 05:03

## 2024-10-24 RX ADMIN — ONDANSETRON HYDROCHLORIDE 4 MILLIGRAM(S): 2 INJECTION, SOLUTION INTRAMUSCULAR; INTRAVENOUS at 17:46

## 2024-10-24 NOTE — PROGRESS NOTE ADULT - SUBJECTIVE AND OBJECTIVE BOX
Chief Complaint:  Patient is a 89y old  Male who presents with a chief complaint of Gastrointestinal hemorrhage     Patient seen and examined at bedside, care giver at bed side . No event over night, had one BM yesterday.        MEDICATIONS:   MEDICATIONS  (STANDING):  ferrous    sulfate 325 milliGRAM(s) Oral daily  pantoprazole  Injectable 40 milliGRAM(s) IV Push every 12 hours  sertraline 100 milliGRAM(s) Oral daily  tamsulosin 0.4 milliGRAM(s) Oral at bedtime    MEDICATIONS  (PRN):  acetaminophen     Tablet .. 650 milliGRAM(s) Oral every 6 hours PRN Temp greater or equal to 38C (100.4F), Mild Pain (1 - 3)  aluminum hydroxide/magnesium hydroxide/simethicone Suspension 30 milliLiter(s) Oral every 4 hours PRN Dyspepsia  melatonin 3 milliGRAM(s) Oral at bedtime PRN Insomnia  ondansetron Injectable 4 milliGRAM(s) IV Push every 8 hours PRN Nausea and/or Vomiting      Packed Red Cells Order:  1 Unit  Indication: Anemia due to Active Hemorrhage - Medical Conditions e.  Infuse Unit : 1 Hour (10-23-24 @ 08:27)  Packed Red Cells Order:  1 Unit  Indication: Anemia due to Active Hemorrhage - Medical Conditions e.  Infuse Unit : 1 Hour (10-23-24 @ 08:27)        DIET:  Diet, DASH/TLC:   Sodium & Cholesterol Restricted  Pureed (PUREED)  No Fish  Supplement Feeding Modality:  Oral  Ensure Plus High Protein Cans or Servings Per Day:  1       Frequency:  Two Times a day (10-24-24 @ 12:46) [Pending Verification By Attending]  Diet, DASH/TLC:   Sodium & Cholesterol Restricted  Pureed (PUREED)  No Fish (10-23-24 @ 12:07) [Active]          ALLERGIES:   Allergies    No Known Allergies    Intolerances        VITAL SIGNS:   Vital Signs Last 24 Hrs  T(C): 36.8 (24 Oct 2024 05:19), Max: 36.8 (24 Oct 2024 05:19)  T(F): 98.3 (24 Oct 2024 05:19), Max: 98.3 (24 Oct 2024 05:19)  HR: 74 (24 Oct 2024 06:50) (74 - 96)  BP: 134/87 (24 Oct 2024 06:50) (134/87 - 173/69)  BP(mean): --  RR: 18 (24 Oct 2024 05:19) (17 - 18)  SpO2: 95% (24 Oct 2024 05:19) (95% - 100%)    Parameters below as of 24 Oct 2024 05:19  Patient On (Oxygen Delivery Method): room air      I&O's Summary      PHYSICAL EXAM:   GENERAL:  No acute distress  HEENT:  NC/AT, conjunctiva clear, sclera anicteric  CHEST:  No increased effort  HEART:  Regular rate  ABDOMEN:  Soft, non-tender, non-distended, positive bowel sounds  EXTREMITIES: No edema  SKIN:  Warm, dry  NEURO:  Calm, cooperative    LABS:                        7.8    3.81  )-----------( 116      ( 24 Oct 2024 08:05 )             23.5     Hemoglobin: 7.8 g/dL (10-24-24 @ 08:05)  Hemoglobin: 8.5 g/dL (10-23-24 @ 20:05)  Hemoglobin: 6.2 g/dL (10-23-24 @ 07:54)    10-24    148[H]  |  115[H]  |  30[H]  ----------------------------<  91  4.0   |  25  |  0.95    Ca    7.7[L]      24 Oct 2024 06:49    TPro  4.3[L]  /  Alb  2.3[L]  /  TBili  2.4[H]  /  DBili  x   /  AST  24  /  ALT  18  /  AlkPhos  106  10-24    LIVER FUNCTIONS - ( 24 Oct 2024 06:49 )  Alb: 2.3 g/dL / Pro: 4.3 g/dL / ALK PHOS: 106 U/L / ALT: 18 U/L / AST: 24 U/L / GGT: x             PT/INR - ( 23 Oct 2024 07:54 )   PT: 19.7 sec;   INR: 1.68 ratio         PTT - ( 23 Oct 2024 07:54 )  PTT:25.7 sec    RADIOLOGY & ADDITIONAL STUDIES:      ACC: [id number] EXAM:  CT ANGIO ABD PELV (W)AW IC   ORDERED BY: ALEA VALENTINE     PROCEDURE DATE:  10/23/2024          INTERPRETATION:  CLINICAL INFORMATION: 89 years  Male with GI Bleed    GI   Bleed.    COMPARISON: CT abdomen and pelvis 6/12/2024    CONTRAST/COMPLICATIONS:  IV Contrast: Omnipaque 350  90 cc administered   10 cc discarded  Oral Contrast: NONE  Complications: None reported at time of study completion    PROCEDURE:  CT of the Abdomen and Pelvis was performed.  Precontrast, Arterial and Delayed phases were performed.  Sagittal and coronal reformats were performed.    Limitation: Motion artifact.    FINDINGS:  LOWER CHEST: Small left pleural effusion. Mild cardiomegaly. TAVR.    LIVER: 3.0 cm left lobe cyst.  BILE DUCTS: Normal caliber.  GALLBLADDER: Cholecystectomy.  SPLEEN: Within normal limits.  PANCREAS: Within normal limits.  ADRENALS: Within normal limits.  KIDNEYS/URETERS: 2.9 cm right mid renal parapelvic cyst and 2.1 cm left   mid renal parapelvic cyst. No hydronephrosis. Symmetrical nephrograms.    BLADDER: Within normal limits.  REPRODUCTIVE ORGANS: Mildly enlarged prostate with coarse calcifications.    BOWEL: No bowel obstruction. Appendix  . Constipated rectum measuring up   to 5.6 cm. Perirectal edema suggestive of stercoral proctitis. No   intraluminal contrast extravasation although somewhat limited by motion   artifact.  PERITONEUM/RETROPERITONEUM: Within normal limits.  VESSELS: Within normal limits.  LYMPH NODES: Right lower quadrant calcified mesenteric lymph nodes.  ABDOMINAL WALL: Within normal limits.  BONES: Stable severe L1 compression fracture. Moderate L2 compression   fracture has occurred in the interval. 2 mm osseous retropulsion of the   inferior endplate into the spinal canal. Stable mild L3 compression   fracture. Stable moderate T12 compression fracture.    IMPRESSION:  Small left pleural effusion.    No CT evidence of active GI bleed although somewhat limited by motion   artifact.    Constipated rectum with possible stercoral proctitis.    L2 compression fracture has occurred in the interval since 6/12/2024. 2   mm osseous retropulsion into the spinal canal. Other compression   fractures unchanged.          --- End of Report ---            ADEOLA BRISCOE MD; Attending Radiologist  This document has been electronically signed. Oct 23 2024  3:25PM  10-23-24 @ 13:47

## 2024-10-24 NOTE — DIETITIAN NUTRITION RISK NOTIFICATION - ADDITIONAL COMMENTS/DIETITIAN RECOMMENDATIONS
1) Recommend Glucerna 8oz PO Daily (Provides 220kcal-10grams of Protein)  1) Recommend Ensure Plus High Protein 8oz PO Daily (Provides 350kcal & 20grams of Protein)

## 2024-10-24 NOTE — PROGRESS NOTE ADULT - ASSESSMENT
89-year-old male with history of MDS, HTN, [place], Alzheimer's, depression, s/p TAVR on Plavix,  BIBA from [facility], with hemoptysis this morning at the rehab prompted Hbg check of 5.5.   Recent hospitalization two weeks ago for low hemoglobin requiring 2unit  RBC transfusion. Pt. experienced dark black stool upon arrival to ED - sent to occult stool. Pt. is afebrile, VSS, alert and oriented. Pt. denies recent falls.  pt unable to provide history. spoke to son, who reports that pt was living at home prior to last hospitalization for covid and GI bleed, ambulates with walker, is able to communicate with full sentences  last admission it was noted that he had slurred speech which resolved after PRB'S, pt noted with slurred speech again today. head ct ordered  was seen by GI lasr admission and decided against endoscopy.      # hemoptysis, dark stool in ED, GI bleed  #  stercoral proctitis.   # goals of care   - ED course- occult positive , hb6.2  receiving 2 u prbs.   - CT A/P - No CT evidence of active GI bleed although somewhat limited by motion artifact.  - will start zosyn for poctitis   - Possible EGD Friday  - GI following   - s/s eval, puree diet   - iv fluids  - hold Plavix  - ppi 40iv q12  - hgb around 8 this am   - dtr is HCP, pt is DNR/DNI, MOLST completed in ED  - cardio consulted- for clearance for endoscopy       #Metabolic encephalopathy, lethargy, garbled speech - likely du to GI BLEED  head ct no acute findings   asp precautions  npo  iv fluids  monitor  pt has been receiving  Procrit by hematology dr. diallo      #Hypo-Albuminemia; Severe protein-calorie malnutrition   #Dysphagia  - Likely malnutrition and age related debility   - npo for now due charles GI bleed  - SLP consulted      #Hyper-Bili   - Could be hemolyzing   - Monitor  - GI to follow    #HTN  #TAVR   - BP boderline  - hold Metoprolol, losartan   - Holding Plavix for now     #BPH  - Cont Flomax     #Depression   - Sertraline     #DVT ppx  - SCDs given positive FOBT    GOC: DNR/I- MOLST completed    89-year-old male with history of MDS, HTN, [place], Alzheimer's, depression, s/p TAVR on Plavix,  BIBA from [facility], with hemoptysis this morning at the rehab prompted Hbg check of 5.5.   Recent hospitalization two weeks ago for low hemoglobin requiring 2unit  RBC transfusion. Pt. experienced dark black stool upon arrival to ED - sent to occult stool. Pt. is afebrile, VSS, alert and oriented. Pt. denies recent falls.  pt unable to provide history. spoke to son, who reports that pt was living at home prior to last hospitalization for covid and GI bleed, ambulates with walker, is able to communicate with full sentences  last admission it was noted that he had slurred speech which resolved after PRB'S, pt noted with slurred speech again today. head ct ordered  was seen by GI lasr admission and decided against endoscopy.      # hemoptysis, dark stool in ED, GI bleed  #  stercoral proctitis.   # goals of care   - ED course- occult positive , hb6.2  receiving 2 u prbs.   - CT A/P - No CT evidence of active GI bleed although somewhat limited by motion artifact.  - will start zosyn for poctitis   - Possible EGD Friday  - GI following   - s/s eval, puree diet   - iv fluids  - hold Plavix  - ppi 40iv q12  - hgb around 8 dropped 7.8  - dtr is HCP, pt is DNR/DNI, MOLST completed in ED  - cardio consulted- for clearance for endoscopy   - another cbc at 5 pm if drops below 7 will transfuse    #Metabolic encephalopathy, lethargy, garbled speech - likely du to GI BLEED  head ct no acute findings   asp precautions  npo  iv fluids  monitor  pt has been receiving  Procrit by hematology dr. diallo      #Hypo-Albuminemia; Severe protein-calorie malnutrition   #Dysphagia  - Likely malnutrition and age related debility   - npo for now due charles GI bleed  - SLP consulted      #Hyper-Bili   - Could be hemolyzing   - Monitor  - GI to follow    #HTN  #TAVR   - BP boderline  - hold Metoprolol, losartan   - Holding Plavix for now     #BPH  - Cont Flomax     #Depression   - Sertraline     #DVT ppx  - SCDs given positive FOBT    GOC: DNR/I- MOLST completed in ED     spoke at length to the son at bedside and gave him an update regarding ongoing treatment and medical care

## 2024-10-24 NOTE — DIETITIAN INITIAL EVALUATION ADULT - ADD RECOMMEND
Monitor daily PO intakes, GI tolerance, labs, weights, skin integrity, & BM regularity  1. Add Ensure Plus High Protein 8oz PO BID (Provides 700kcal & 40grams of Protein)   2. Monitor daily PO intakes, GI tolerance, labs, weights, skin integrity, & BM regularity

## 2024-10-24 NOTE — CONSULT NOTE ADULT - ASSESSMENT
A/P 89-year-old male with history of MDS, HTN, [place], Alzheimer's, depression, s/p TAVR on Plavix,  BIBA from [facility], with hemoptysis this morning at the rehab prompted Hbg check of 5.5.   Recent hospitalization two weeks ago for low hemoglobin requiring 2unit  RBC transfusion. Pt  experienced dark black stool upon arrival to ED.   Denies recent falls.  pt unable to provide history, per son,  reports that pt was living at home prior to last hospitalization for covid and GI bleed, ambulates with walker, is able to communicate with full sentences  last admission it was noted that he had slurred speech which resolved after PRBC 'S, pt noted with slurred speech again today. Ct head ordered today   was seen by GI last  admission and decided against endoscopy.          assessment/plans:     hemoptysis, dark stool in ED, GI bleed    stercoral proctitis  - ED course- occult positive , hb6.2  received  2 u prbs.   - CT A/P - No CT evidence of active GI bleed -  limited by motion artifact    -   + stercoral proctitis        On IV zosyn    - GI consulted -  Plans for EGD   - Possible EGD Friday  - s/s eval   - iv fluids  - hold Plavix  - Cardio consulted- for clearance for endoscopy       Metabolic encephalopathy, lethargy   - head ct no acute findings       Hypo-Albuminemia; Severe protein-calorie malnutrition   Dysphagia  - s/s eval - mod diet       Depression   - On Sertraline ( home med)       Palliative :  asked for age and gi bleed , chart reviewed for hx, tests, consults noted, , pt known to palliative team from previous admission here approx 2 weeks ago . At that time,comfort measures were recommended , which family was not in agreement  with.  They wished to continue with blood transfusions  as needed . I saw pt in bed this afternoon, Pvt aide at bedside, says she was with pt at [facility] [place] when he was in bathroom and he  vomitted  lrg amounts red blood.  Pt in bed, weakened, lethargic, not opening eyes, not interactive w/ me.  He was not in distress    Pt s/p 2 U prbc's given n ED.    I placed  call to daughter Ana # [phone] , there was no answer, and no machine for messages .  per chart and d/w med team today  , pt seen by GI , planning  for EGD if cleared by Cardio , consult  pending,  and presently, family in agreement with continuing blood transfusions as needed.  Can follow  clinical course w/ med team , palliative  recs remain for comfort type measures, if family agreeable .

## 2024-10-24 NOTE — DIETITIAN INITIAL EVALUATION ADULT - PERTINENT LABORATORY DATA
10-24    148[H]  |  115[H]  |  30[H]  ----------------------------<  91  4.0   |  25  |  0.95    Ca    7.7[L]      24 Oct 2024 06:49    TPro  4.3[L]  /  Alb  2.3[L]  /  TBili  2.4[H]  /  DBili  x   /  AST  24  /  ALT  18  /  AlkPhos  106  10-24  A1C with Estimated Average Glucose Result: 5.0 % (06-14-24 @ 05:36)

## 2024-10-24 NOTE — DIETITIAN INITIAL EVALUATION ADULT - ORAL INTAKE PTA/DIET HISTORY
Pt seen this morning coming from [facility]. Daughter present at bedside to answer interview questions. Per daughter, pt had an adequate appetite/ intake PTA. Reports declining PO over the year 2/2 falls and hospitalizations. Pt currently on a pureed diet with poor adherence. Daughter states bringing pt sandwiches from outside with 50% consumption.  lbs.  lbs. No GI distress noted. Pt denies chewing/ swallowing issues.  Pt seen this morning coming from [facility]. Daughter present at bedside to answer interview questions. Per daughter, pt had an adequate appetite/ intake PTA. Reports declining PO over the year 2/2 falls and hospitalizations. Pt currently on a pureed diet with poor adherence. Recommended Glucerna 8oz PO Daily (Provides 220kcal-10grams of Protein) to meet increased needs and promote wound healing. Daughter states bringing pt sandwiches from outside with 50% consumption.  lbs.  lbs. No GI distress noted. Pt denies chewing/ swallowing issues.  Pt seen this morning coming from [facility]. Daughter present at bedside to answer interview questions. Per daughter, pt had an adequate appetite/ intake PTA. Reports declining PO over the year 2/2 falls and hospitalizations. Pt currently on a pureed diet with poor adherence. Recommended Ensure Plus High Protein 8oz PO Daily (Provides 350kcal & 20grams of Protein)  to meet increased needs and promote wound healing. Daughter states bringing pt sandwiches from outside with 50% consumption.  lbs.  lbs. No GI distress noted. Pt denies chewing/ swallowing issues.  Pt seen this morning coming from [facility]. Daughter present at bedside to answer interview questions. Per daughter, pt had an adequate appetite/ intake PTA. Reports declining PO over the year 2/2 falls and hospitalizations. Pt currently on a pureed diet with poor adherence. Recommended Ensure Plus High Protein 8oz PO Daily (Provides 350kcal & 20grams of Protein) to meet increased needs and promote wound healing.  lbs.  lbs. No GI distress noted. Pt denies chewing/swallowing issues. Will follow up with SLP recommendations regarding appropriate textured/modified diet and consistency

## 2024-10-24 NOTE — DIETITIAN NUTRITION RISK NOTIFICATION - PHYSICAL ASSESSMENT CLAVICLES
Requested medication(s) are due for refill today:YES   Patient has already received a courtesy refill: NO   Other reason request has been forwarded to provider: severe

## 2024-10-24 NOTE — CONSULT NOTE ADULT - SUBJECTIVE AND OBJECTIVE BOX
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   Recent hospitalization two weeks ago for low hemoglobin requiring 2unit  RBC transfusion. Pt. experienced dark black stool upon arrival to ED (23 Oct 2024 10:24)        ALLERGIES:  No Known Allergies      PAST MEDICAL & SURGICAL HISTORY:  Hypertension      Anemia      Alzheimer disease      Myelodysplastic syndrome      H/O Billroth II operation      History of bone marrow biopsy            CURRENT MEDICATIONS:  MEDICATIONS  (STANDING):  ferrous    sulfate 325 milliGRAM(s) Oral daily  pantoprazole  Injectable 40 milliGRAM(s) IV Push every 12 hours  sertraline 100 milliGRAM(s) Oral daily  tamsulosin 0.4 milliGRAM(s) Oral at bedtime    MEDICATIONS  (PRN):  acetaminophen     Tablet .. 650 milliGRAM(s) Oral every 6 hours PRN Temp greater or equal to 38C (100.4F), Mild Pain (1 - 3)  aluminum hydroxide/magnesium hydroxide/simethicone Suspension 30 milliLiter(s) Oral every 4 hours PRN Dyspepsia  melatonin 3 milliGRAM(s) Oral at bedtime PRN Insomnia  ondansetron Injectable 4 milliGRAM(s) IV Push every 8 hours PRN Nausea and/or Vomiting      SOCIAL HISTORY:  unable to obtain    FAMILY HISTORY:  unable to obtain    ROS:  All 10 systems reviewed and positives noted in HPI    OBJECTIVE:    VITAL SIGNS:  Vital Signs Last 24 Hrs  T(C): 36.6 (24 Oct 2024 13:19), Max: 36.8 (24 Oct 2024 05:19)  T(F): 97.9 (24 Oct 2024 13:19), Max: 98.3 (24 Oct 2024 05:19)  HR: 69 (24 Oct 2024 13:19) (69 - 96)  BP: 158/69 (24 Oct 2024 13:19) (134/87 - 173/69)  BP(mean): --  RR: 18 (24 Oct 2024 13:19) (17 - 18)  SpO2: 96% (24 Oct 2024 13:19) (95% - 100%)    Parameters below as of 24 Oct 2024 13:19  Patient On (Oxygen Delivery Method): room air        PHYSICAL EXAM:  General:  no distress  HEENT: sclera anicteric  Neck: supple, no carotid bruits b/l  CVS: JVP ~ 7 cm H20, regular, no murmurs/rubs/gallops  Chest: unlabored respirations, clear to auscultation b/l  Abdomen: non-distended  Extremities: no lower extremity edema b/l  Neuro: awake      LABS:                        7.8    3.81  )-----------( 116      ( 24 Oct 2024 08:05 )             23.5     10-24    148[H]  |  115[H]  |  30[H]  ----------------------------<  91  4.0   |  25  |  0.95    Ca    7.7[L]      24 Oct 2024 06:49    TPro  4.3[L]  /  Alb  2.3[L]  /  TBili  2.4[H]  /  DBili  x   /  AST  24  /  ALT  18  /  AlkPhos  106  10-24        PT/INR - ( 23 Oct 2024 07:54 )   PT: 19.7 sec;   INR: 1.68 ratio         PTT - ( 23 Oct 2024 07:54 )  PTT:25.7 sec      ECG: NSR      TTE: < from: TTE W or WO Ultrasound Enhancing Agent (06.13.24 @ 10:43) >   1. Left ventricular cavity is normal in size. Left ventricular wall thickness is normal.   2. A Transcatheter deployed (TAVR) valve replacement is present in the aortic position The prosthetic valve is well seated with normal function. No paravalvular regurgitation.

## 2024-10-24 NOTE — CONSULT NOTE ADULT - ASSESSMENT
89-year-old male history of myelodysplastic syndrome, Alzheimer's, anemia and hypertension admitted with GI bleed.  Cardiology consulted for clearance prior to endoscopy.    EKG sinus rhythm, no ischemia  TTE from June 2024 generally unremarkable.  TAVR Present in the aortic position, well-seated with normal function  Labs significant for hemoglobin 6.2 requiring transfusions  Patient currently sinus on telemetry, no malignant arrhythmias  Does not appear in any decompensated heart failure state  Overall can proceed as a increased cardiac risk secondary to age for endoscopy  Resume Plavix when cleared by surgery 89-year-old male history of myelodysplastic syndrome, Alzheimer's, anemia and hypertension admitted with GI bleed.  Cardiology consulted for clearance prior to endoscopy.    EKG sinus rhythm, no ischemia  TTE from June 2024 generally unremarkable.  TAVR Present in the aortic position, well-seated with normal function  Labs significant for hemoglobin 6.2 requiring transfusions  Patient currently sinus on telemetry, no malignant arrhythmias  Does not appear in any decompensated heart failure state  Overall can proceed as a increased cardiac risk secondary to age for endoscopy  Resume Plavix when cleared by surgery    10/24/2023     patient examined and chart reviewed  patient with a complicated history including valve replacement in [facility] followed by Dr. Caldera now with recurrent GI bleeding.    Without evidence for recent infarction overt heart failure or unstable angina Mr. Rascon may undergo planned upper endoscopy which constitutes a low risk procedure in a patient with high cardiovascular risk based upon valve surgery advanced age and probable underlying coronary disease. Although there are no strict contraindications patient patient has increased risk.  His family wll get back to me if they do not wish to proceed with the procedure because of increased cardiovascular risk      care is coordinated with daughter at [facility] [phone]  89-year-old male history of myelodysplastic syndrome, Alzheimer's, anemia and hypertension admitted with GI bleed.  Cardiology consulted for clearance prior to endoscopy.    EKG sinus rhythm, no ischemia  TTE from June 2024 generally unremarkable.  TAVR Present in the aortic position, well-seated with normal function  Labs significant for hemoglobin 6.2 requiring transfusions  Patient currently sinus on telemetry, no malignant arrhythmias  Does not appear in any decompensated heart failure state  Overall can proceed as a increased cardiac risk secondary to age for endoscopy  Resume Plavix when cleared by surgery    10/24/2023   cardio attending  patient examined and chart reviewed  patient with a complicated history including valve replacement in [facility] followed by Dr. Caldera now with recurrent GI bleeding. on procrit    Without evidence for recent infarction overt heart failure or unstable angina Mr. Rascon may undergo planned upper endoscopy which constitutes a low risk procedure in a patient with high cardiovascular risk based upon valve surgery advanced age and probable underlying coronary disease. Although there are no strict contraindications patient has increased risk.  His family wll get back to me if they do not wish to proceed with the procedure because of increased cardiovascular risk      patient has been rendered DRN/I according to daughter. hospice care has been deferred by family in the past. previously did not want feeding tubes. care is coordinated with daughter at [facility] [phone]

## 2024-10-24 NOTE — PROGRESS NOTE ADULT - ASSESSMENT
89-year-old male with history of MDS, HTN, [place], Alzheimer's, depression, s/p TAVR on Plavix,  BIBA from [facility], with hemoptysis this morning at the rehab prompted Hbg check of 5.5.   Recent hospitalization two weeks ago for low hemoglobin requiring 2unit  RBC transfusion. Pt. experienced dark black stool upon arrival to ED - sent to occult stool. Pt. is afebrile, VSS, alert and oriented. Pt. denies recent falls.    pt unable to provide history.  spoke to son, who reports that pt was living at home prior to last hospitalization for covid and GI bleed, ambulates with walker, is able to communicate with full sentences  last admission it was noted that he had slurred speech which resolved after PRB'S, pt noted with slurred speech again today. head ct ordered  was seen by GI last admission and decided against endoscopy.    ED course- occult positive , receiving 2 u prbs.  GI  consulted.   son and dtr would like to speak to GI re: endoscopy at this time.   dtr is HCP,  DNR/DNI,    (23 Oct 2024 10:24)    GI consulted to see patient due to GIB, patient known from previous admission. Patient noted to have hematemesis at facility and melena with clots in ER. HPI obtained from patient daughter at bedside, staff and records. No recent EGDs  or colonoscopy reported.

## 2024-10-24 NOTE — PROGRESS NOTE ADULT - PROBLEM SELECTOR PLAN 1
GIB (gastrointestinal bleeding).   Keep active type and screen  S/P 2 units PRBCS  Cont PPI BID  Hold plavix for now  CTA results no active GIB    EGD to be discussed   Cardiac clearance needed if plan for any GI procedure

## 2024-10-24 NOTE — DIETITIAN INITIAL EVALUATION ADULT - OTHER INFO
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, with hemoptysis this morning at the rehab prompted Hbg check of 5.5.   Recent hospitalization two weeks ago for low hemoglobin requiring 2unit  RBC transfusion. Pt. experienced dark black stool upon arrival to ED - sent to occult stool. Pt. is afebrile, VSS, alert and oriented. Pt. denies recent falls.

## 2024-10-24 NOTE — DIETITIAN INITIAL EVALUATION ADULT - PERTINENT MEDS FT
MEDICATIONS  (STANDING):  ferrous    sulfate 325 milliGRAM(s) Oral daily  pantoprazole  Injectable 40 milliGRAM(s) IV Push every 12 hours  sertraline 100 milliGRAM(s) Oral daily  tamsulosin 0.4 milliGRAM(s) Oral at bedtime    MEDICATIONS  (PRN):  acetaminophen     Tablet .. 650 milliGRAM(s) Oral every 6 hours PRN Temp greater or equal to 38C (100.4F), Mild Pain (1 - 3)  aluminum hydroxide/magnesium hydroxide/simethicone Suspension 30 milliLiter(s) Oral every 4 hours PRN Dyspepsia  melatonin 3 milliGRAM(s) Oral at bedtime PRN Insomnia  ondansetron Injectable 4 milliGRAM(s) IV Push every 8 hours PRN Nausea and/or Vomiting

## 2024-10-24 NOTE — DIETITIAN NUTRITION RISK NOTIFICATION - TREATMENT: THE FOLLOWING DIET HAS BEEN RECOMMENDED
Diet, DASH/TLC:   Sodium & Cholesterol Restricted  Pureed (PUREED)  No Fish (10-23-24 @ 12:07) [Active]

## 2024-10-24 NOTE — PROGRESS NOTE ADULT - SUBJECTIVE AND OBJECTIVE BOX
PROGRESS NOTE:     Patient is a 89y old  Male who presents with a chief complaint of gi bleed AMS (23 Oct 2024 14:30)      SUBJECTIVE / OVERNIGHT EVENTS: pt was seen and evaluated today  he was sleeping comfortably in bed  arousable to verbal stimuali.      ADDITIONAL REVIEW OF SYSTEMS:    MEDICATIONS  (STANDING):  ferrous    sulfate 325 milliGRAM(s) Oral daily  pantoprazole  Injectable 40 milliGRAM(s) IV Push every 12 hours  sertraline 100 milliGRAM(s) Oral daily  tamsulosin 0.4 milliGRAM(s) Oral at bedtime    MEDICATIONS  (PRN):  acetaminophen     Tablet .. 650 milliGRAM(s) Oral every 6 hours PRN Temp greater or equal to 38C (100.4F), Mild Pain (1 - 3)  aluminum hydroxide/magnesium hydroxide/simethicone Suspension 30 milliLiter(s) Oral every 4 hours PRN Dyspepsia  melatonin 3 milliGRAM(s) Oral at bedtime PRN Insomnia  ondansetron Injectable 4 milliGRAM(s) IV Push every 8 hours PRN Nausea and/or Vomiting      CAPILLARY BLOOD GLUCOSE        I&O's Summary      PHYSICAL EXAM:  Vital Signs Last 24 Hrs  T(C): 36.8 (24 Oct 2024 05:19), Max: 36.8 (24 Oct 2024 05:19)  T(F): 98.3 (24 Oct 2024 05:19), Max: 98.3 (24 Oct 2024 05:19)  HR: 74 (24 Oct 2024 06:50) (74 - 96)  BP: 134/87 (24 Oct 2024 06:50) (110/53 - 173/69)  BP(mean): --  RR: 18 (24 Oct 2024 05:19) (16 - 21)  SpO2: 95% (24 Oct 2024 05:19) (95% - 100%)    Parameters below as of 24 Oct 2024 05:19  Patient On (Oxygen Delivery Method): room air      GENERAL- NAD  EAR/NOSE/MOUTH/THROAT -  MMM  EYES- [place], conjunctiva and Sclera clear  NECK- supple  RESPIRATORY-  clear to auscultation bilaterally  CARDIOVASCULAR - SIS2, RRR  GI - soft NT BS present  EXTREMITIES- no pedal edema  NEUROLOGY- no gross focal deficits, garbled speech   PSYCHIATRY- AAO X 0        LABS:                        7.8    3.81  )-----------( 116      ( 24 Oct 2024 08:05 )             23.5     10    144  |  113[H]  |  29[H]  ----------------------------<  89  4.6   |  24  |  1.05    Ca    7.4[L]      23 Oct 2024 07:54    TPro  4.4[L]  /  Alb  2.3[L]  /  TBili  1.5[H]  /  DBili  x   /  AST  29  /  ALT  20  /  AlkPhos  112  10-23    PT/INR - ( 23 Oct 2024 07:54 )   PT: 19.7 sec;   INR: 1.68 ratio         PTT - ( 23 Oct 2024 07:54 )  PTT:25.7 sec      Urinalysis Basic - ( 23 Oct 2024 10:35 )    Color: Yellow / Appearance: Clear / S.019 / pH: x  Gluc: x / Ketone: Negative mg/dL  / Bili: Negative / Urobili: 1.0 mg/dL   Blood: x / Protein: Negative mg/dL / Nitrite: Negative   Leuk Esterase: Trace / RBC: 1 /HPF / WBC 1 /HPF   Sq Epi: x / Non Sq Epi: x / Bacteria: x          RADIOLOGY & ADDITIONAL TESTS:  Results Reviewed: yes   Imaging Personally Reviewed: yes  Electrocardiogram Personally Reviewed: yes    COORDINATION OF CARE:   Care Discussed with Consultants/Other Providers [Y/N]: y  Prior or Outpatient Records Reviewed [Y/N]:y

## 2024-10-24 NOTE — CONSULT NOTE ADULT - SUBJECTIVE AND OBJECTIVE BOX
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   Recent hospitalization two weeks ago for low hemoglobin requiring 2unit  RBC transfusion. Pt. experienced dark black stool upon arrival to ED - sent to occult stool. Pt. is afebrile, VSS, alert and oriented. Pt. denies recent falls.    pt unable to provide history.  spoke to son, who reports that pt was living at home prior to last hospitalization for covid and GI bleed, ambulates with walker, is able to communicate with full sentences  last admission it was noted that he had slurred speech which resolved after PRB'S, pt noted with slurred speech again today. head ct ordered  was seen by GI last admission and decided against endoscopy.    ED course- occult positive , receiving 2 u prbs.  GI  consulted.   son and dtr would like to speak to GI re: endoscopy at this time.   dtr is HCP,  DNR/DNI,            (23 Oct 2024 10:24)      PAST MEDICAL & SURGICAL HISTORY:  Hypertension      Anemia      Alzheimer disease      Myelodysplastic syndrome      H/O Billroth II operation      History of bone marrow biopsy          SOCIAL HISTORY:    Admitted from:   [facility]   Substance abuse history:              Tobacco hx:                  Alcohol hx:              Home Opioid hx:  Catholic:                                    Preferred Language:    Surrogate:  daughter Laura [phone]            FAMILY HISTORY:    Baseline ADLs (prior to admission):    Allergies    No Known Allergies    Intolerances        Review of Systems: [ Unable to obtain due to poor mentation]    Pain:            unable to verbalize                      QOL impact -   Location -                    Aggravating factors -  Quality -  Radiation -  Timing-  Severity (0-10 scale):  Minimal acceptable level (0-10 scale):             MEDICATIONS  (STANDING):  ferrous    sulfate 325 milliGRAM(s) Oral daily  pantoprazole  Injectable 40 milliGRAM(s) IV Push every 12 hours  sertraline 100 milliGRAM(s) Oral daily  tamsulosin 0.4 milliGRAM(s) Oral at bedtime    MEDICATIONS  (PRN):  acetaminophen     Tablet .. 650 milliGRAM(s) Oral every 6 hours PRN Temp greater or equal to 38C (100.4F), Mild Pain (1 - 3)  aluminum hydroxide/magnesium hydroxide/simethicone Suspension 30 milliLiter(s) Oral every 4 hours PRN Dyspepsia  melatonin 3 milliGRAM(s) Oral at bedtime PRN Insomnia  ondansetron Injectable 4 milliGRAM(s) IV Push every 8 hours PRN Nausea and/or Vomiting      PHYSICAL EXAM:    Vital Signs Last 24 Hrs  T(C): 36.6 (24 Oct 2024 13:19), Max: 36.8 (24 Oct 2024 05:19)  T(F): 97.9 (24 Oct 2024 13:19), Max: 98.3 (24 Oct 2024 05:19)  HR: 69 (24 Oct 2024 13:19) (69 - 96)  BP: 158/69 (24 Oct 2024 13:19) (134/87 - 173/69)  BP(mean): --  RR: 18 (24 Oct 2024 13:19) (17 - 18)  SpO2: 96% (24 Oct 2024 13:19) (95% - 100%)    Parameters below as of 24 Oct 2024 13:19  Patient On (Oxygen Delivery Method): room air        General: lethargic nonverbal  unarousable at present time   Karnofsky Performance Score/Palliative Performance Status Version2: 20    %  PPSV: 20%  Chaplaincy Referral: [ ] yes [ ] refused [ ] following [x ] Deferred     HEENT: n/c, a/t , w/ dry mouth    Lungs: ess clear dim to bases   CV: normal rate   GI: normal  , abd soft, n/t    : normal  incontinent w/ harris  Musculoskeletal: normal w/  weakness  tr edema   Skin: normal  , pale, w/d   Neuro: lethargic,  not interactive, not following commands   Oral intake ability: unable/only mouth care - minimal   Diet: [NPO]- tbd     LABS:                        7.8    3.81  )-----------( 116      ( 24 Oct 2024 08:05 )             23.5     10-24    148[H]  |  115[H]  |  30[H]  ----------------------------<  91  4.0   |  25  |  0.95    Ca    7.7[L]      24 Oct 2024 06:49    TPro  4.3[L]  /  Alb  2.3[L]  /  TBili  2.4[H]  /  DBili  x   /  AST  24  /  ALT  18  /  AlkPhos  106  10-24    Urinalysis Basic - ( 24 Oct 2024 06:49 )    Color: x / Appearance: x / SG: x / pH: x  Gluc: 91 mg/dL / Ketone: x  / Bili: x / Urobili: x   Blood: x / Protein: x / Nitrite: x   Leuk Esterase: x / RBC: x / WBC x   Sq Epi: x / Non Sq Epi: x / Bacteria: x        RADIOLOGY & ADDITIONAL STUDIES:     < from: CT Head No Cont (10.23.24 @ 10:52) >    IMPRESSION:  Mild chronic microvascular changes without evidence of an   acute transcortical infarction or hemorrhage.          < from: CT Angio Abdomen and Pelvis w/ IV Cont (10.23.24 @ 13:47) >    IMPRESSION:  Small left pleural effusion.    No CT evidence of active GI bleed although somewhat limited by motion   artifact.    Constipated rectum with possible stercoral proctitis.    L2 compression fracture has occurred in the interval since 6/12/2024. 2   mm osseous retropulsion into the spinal canal. Other compression   fractures unchanged.        ADVANCE DIRECTIVES:  Molst dnr/dni   Advanced Care Planning discussion total time spent:   HPI: 89-year-old male with history of MDS, HTN, [place], Alzheimer's, depression, s/p TAVR on Plavix,  BIBA from [facility], with hemoptysis this morning at the rehab prompted Hbg check of 5.5.   Recent hospitalization two weeks ago for low hemoglobin requiring 2unit  RBC transfusion. Pt. experienced dark black stool upon arrival to ED - sent to occult stool. Pt. is afebrile, VSS, alert and oriented. Pt. denies recent falls.    pt unable to provide history.  son reported that pt was living at home prior to last hospitalization for covid and GI bleed, ambulates with walker, is able to communicate with full sentences  last admission it was noted that he had slurred speech which resolved after PRB'S, pt noted with slurred speech again today. head ct ordered  was seen by GI last admission and decided against endoscopy.           (23 Oct 2024 10:24)      PAST MEDICAL & SURGICAL HISTORY:  Hypertension      Anemia      Alzheimer disease      Myelodysplastic syndrome      H/O Billroth II operation      History of bone marrow biopsy          SOCIAL HISTORY:    Admitted from:   [facility]   Substance abuse history:              Tobacco hx:                  Alcohol hx:              Home Opioid hx:  Catholic:                                    Preferred Language:    Surrogate:  daughter Laura [phone]            FAMILY HISTORY:    Baseline ADLs (prior to admission):    Allergies    No Known Allergies    Intolerances        Review of Systems: [ Unable to obtain due to poor mentation]    Pain:            unable to verbalize                      QOL impact -   Location -                    Aggravating factors -  Quality -  Radiation -  Timing-  Severity (0-10 scale):  Minimal acceptable level (0-10 scale):             MEDICATIONS  (STANDING):  ferrous    sulfate 325 milliGRAM(s) Oral daily  pantoprazole  Injectable 40 milliGRAM(s) IV Push every 12 hours  sertraline 100 milliGRAM(s) Oral daily  tamsulosin 0.4 milliGRAM(s) Oral at bedtime    MEDICATIONS  (PRN):  acetaminophen     Tablet .. 650 milliGRAM(s) Oral every 6 hours PRN Temp greater or equal to 38C (100.4F), Mild Pain (1 - 3)  aluminum hydroxide/magnesium hydroxide/simethicone Suspension 30 milliLiter(s) Oral every 4 hours PRN Dyspepsia  melatonin 3 milliGRAM(s) Oral at bedtime PRN Insomnia  ondansetron Injectable 4 milliGRAM(s) IV Push every 8 hours PRN Nausea and/or Vomiting      PHYSICAL EXAM:    Vital Signs Last 24 Hrs  T(C): 36.6 (24 Oct 2024 13:19), Max: 36.8 (24 Oct 2024 05:19)  T(F): 97.9 (24 Oct 2024 13:19), Max: 98.3 (24 Oct 2024 05:19)  HR: 69 (24 Oct 2024 13:19) (69 - 96)  BP: 158/69 (24 Oct 2024 13:19) (134/87 - 173/69)  BP(mean): --  RR: 18 (24 Oct 2024 13:19) (17 - 18)  SpO2: 96% (24 Oct 2024 13:19) (95% - 100%)    Parameters below as of 24 Oct 2024 13:19  Patient On (Oxygen Delivery Method): room air        General: lethargic nonverbal  unarousable at present time   Karnofsky Performance Score/Palliative Performance Status Version2: 20    %  PPSV: 20%  Chaplaincy Referral: [ ] yes [ ] refused [ ] following [x ] Deferred     HEENT: n/c, a/t , w/ dry mouth    Lungs: ess clear dim to bases   CV: normal rate   GI: normal  , abd soft, n/t    : normal  incontinent w/ harris  Musculoskeletal: normal w/  weakness  tr edema   Skin: normal  , pale, w/d   Neuro: lethargic,  not interactive, not following commands   Oral intake ability: unable/only mouth care - minimal   Diet: [NPO]- tbd     LABS:                        7.8    3.81  )-----------( 116      ( 24 Oct 2024 08:05 )             23.5     10-24    148[H]  |  115[H]  |  30[H]  ----------------------------<  91  4.0   |  25  |  0.95    Ca    7.7[L]      24 Oct 2024 06:49    TPro  4.3[L]  /  Alb  2.3[L]  /  TBili  2.4[H]  /  DBili  x   /  AST  24  /  ALT  18  /  AlkPhos  106  10-24    Urinalysis Basic - ( 24 Oct 2024 06:49 )    Color: x / Appearance: x / SG: x / pH: x  Gluc: 91 mg/dL / Ketone: x  / Bili: x / Urobili: x   Blood: x / Protein: x / Nitrite: x   Leuk Esterase: x / RBC: x / WBC x   Sq Epi: x / Non Sq Epi: x / Bacteria: x        RADIOLOGY & ADDITIONAL STUDIES:     < from: CT Head No Cont (10.23.24 @ 10:52) >    IMPRESSION:  Mild chronic microvascular changes without evidence of an   acute transcortical infarction or hemorrhage.          < from: CT Angio Abdomen and Pelvis w/ IV Cont (10.23.24 @ 13:47) >    IMPRESSION:  Small left pleural effusion.    No CT evidence of active GI bleed although somewhat limited by motion   artifact.    Constipated rectum with possible stercoral proctitis.    L2 compression fracture has occurred in the interval since 6/12/2024. 2   mm osseous retropulsion into the spinal canal. Other compression   fractures unchanged.        ADVANCE DIRECTIVES:  Molst in chart  dnr/dni   Advanced Care Planning discussion total time spent:

## 2024-10-25 LAB
ALBUMIN SERPL ELPH-MCNC: 2.4 G/DL — LOW (ref 3.3–5)
ALP SERPL-CCNC: 108 U/L — SIGNIFICANT CHANGE UP (ref 40–120)
ALT FLD-CCNC: 15 U/L — SIGNIFICANT CHANGE UP (ref 10–45)
ANION GAP SERPL CALC-SCNC: 11 MMOL/L — SIGNIFICANT CHANGE UP (ref 5–17)
AST SERPL-CCNC: 23 U/L — SIGNIFICANT CHANGE UP (ref 10–40)
BILIRUB SERPL-MCNC: 2.7 MG/DL — HIGH (ref 0.2–1.2)
BUN SERPL-MCNC: 27 MG/DL — HIGH (ref 7–23)
CALCIUM SERPL-MCNC: 8 MG/DL — LOW (ref 8.4–10.5)
CHLORIDE SERPL-SCNC: 115 MMOL/L — HIGH (ref 96–108)
CO2 SERPL-SCNC: 23 MMOL/L — SIGNIFICANT CHANGE UP (ref 22–31)
CREAT SERPL-MCNC: 0.9 MG/DL — SIGNIFICANT CHANGE UP (ref 0.5–1.3)
EGFR: 77 ML/MIN/1.73M2 — SIGNIFICANT CHANGE UP
GLUCOSE SERPL-MCNC: 80 MG/DL — SIGNIFICANT CHANGE UP (ref 70–99)
HCT VFR BLD CALC: 24.1 % — LOW (ref 39–50)
HGB BLD-MCNC: 7.7 G/DL — LOW (ref 13–17)
MCHC RBC-ENTMCNC: 30.3 PG — SIGNIFICANT CHANGE UP (ref 27–34)
MCHC RBC-ENTMCNC: 32 GM/DL — SIGNIFICANT CHANGE UP (ref 32–36)
MCV RBC AUTO: 94.9 FL — SIGNIFICANT CHANGE UP (ref 80–100)
NRBC # BLD: 0 /100 WBCS — SIGNIFICANT CHANGE UP (ref 0–0)
PLATELET # BLD AUTO: 129 K/UL — LOW (ref 150–400)
POTASSIUM SERPL-MCNC: 3.8 MMOL/L — SIGNIFICANT CHANGE UP (ref 3.5–5.3)
POTASSIUM SERPL-SCNC: 3.8 MMOL/L — SIGNIFICANT CHANGE UP (ref 3.5–5.3)
PROT SERPL-MCNC: 4.3 G/DL — LOW (ref 6–8.3)
RBC # BLD: 2.54 M/UL — LOW (ref 4.2–5.8)
RBC # FLD: 20.7 % — HIGH (ref 10.3–14.5)
SODIUM SERPL-SCNC: 149 MMOL/L — HIGH (ref 135–145)
WBC # BLD: 5.34 K/UL — SIGNIFICANT CHANGE UP (ref 3.8–10.5)
WBC # FLD AUTO: 5.34 K/UL — SIGNIFICANT CHANGE UP (ref 3.8–10.5)

## 2024-10-25 PROCEDURE — 99233 SBSQ HOSP IP/OBS HIGH 50: CPT

## 2024-10-25 PROCEDURE — 99232 SBSQ HOSP IP/OBS MODERATE 35: CPT

## 2024-10-25 PROCEDURE — 99233 SBSQ HOSP IP/OBS HIGH 50: CPT | Mod: FS

## 2024-10-25 RX ORDER — MORPHINE SULFATE 30 MG/1
1 TABLET, EXTENDED RELEASE ORAL ONCE
Refills: 0 | Status: DISCONTINUED | OUTPATIENT
Start: 2024-10-25 | End: 2024-10-25

## 2024-10-25 RX ADMIN — MORPHINE SULFATE 1 MILLIGRAM(S): 30 TABLET, EXTENDED RELEASE ORAL at 17:59

## 2024-10-25 RX ADMIN — PANTOPRAZOLE SODIUM 40 MILLIGRAM(S): 40 TABLET, DELAYED RELEASE ORAL at 05:00

## 2024-10-25 RX ADMIN — PANTOPRAZOLE SODIUM 40 MILLIGRAM(S): 40 TABLET, DELAYED RELEASE ORAL at 17:59

## 2024-10-25 RX ADMIN — MORPHINE SULFATE 1 MILLIGRAM(S): 30 TABLET, EXTENDED RELEASE ORAL at 18:33

## 2024-10-25 NOTE — PROGRESS NOTE ADULT - SUBJECTIVE AND OBJECTIVE BOX
Chief Complaint:  Patient is a 89y old  Male who presents with a chief complaint of gi bleed AMS (25 Oct 2024 10:49)     Patient seen and examined at bedside. He is lethargic, opened eyes to verbal .  As per RN no event over night, no bowel movement over night.     MEDICATIONS:   MEDICATIONS  (STANDING):  ferrous    sulfate 325 milliGRAM(s) Oral daily  pantoprazole  Injectable 40 milliGRAM(s) IV Push every 12 hours  sertraline 100 milliGRAM(s) Oral daily  tamsulosin 0.4 milliGRAM(s) Oral at bedtime    MEDICATIONS  (PRN):  acetaminophen     Tablet .. 650 milliGRAM(s) Oral every 6 hours PRN Temp greater or equal to 38C (100.4F), Mild Pain (1 - 3)  aluminum hydroxide/magnesium hydroxide/simethicone Suspension 30 milliLiter(s) Oral every 4 hours PRN Dyspepsia  melatonin 3 milliGRAM(s) Oral at bedtime PRN Insomnia  ondansetron Injectable 4 milliGRAM(s) IV Push every 8 hours PRN Nausea and/or Vomiting      Packed Red Cells Order:  1 Unit  Indication: Anemia due to Active Hemorrhage - Medical Conditions e.  Infuse Unit : 1 Hour (10-23-24 @ 08:27)  Packed Red Cells Order:  1 Unit  Indication: Anemia due to Active Hemorrhage - Medical Conditions e.  Infuse Unit : 1 Hour (10-23-24 @ 08:27)        DIET:  Diet, NPO (10-25-24 @ 10:02) [Active]          ALLERGIES:   Allergies    No Known Allergies    Intolerances        VITAL SIGNS:   Vital Signs Last 24 Hrs  T(C): 36.6 (25 Oct 2024 05:19), Max: 36.6 (24 Oct 2024 13:19)  T(F): 97.9 (25 Oct 2024 05:19), Max: 97.9 (24 Oct 2024 13:19)  HR: 72 (25 Oct 2024 06:40) (69 - 78)  BP: 145/80 (25 Oct 2024 06:40) (145/80 - 172/64)  BP(mean): --  RR: 16 (25 Oct 2024 05:19) (16 - 18)  SpO2: 97% (25 Oct 2024 05:19) (96% - 97%)    Parameters below as of 25 Oct 2024 05:19  Patient On (Oxygen Delivery Method): room air      I&O's Summary      PHYSICAL EXAM:   GENERAL:  No acute distress  HEENT:  NC/AT, conjunctiva clear, sclera anicteric  CHEST:  No increased effort  HEART:  Regular rate  ABDOMEN:  Soft, non-tender, non-distended, positive bowel sounds  EXTREMITIES: No edema  SKIN:  Warm, dry  NEURO:  Lethargic     LABS:                        7.7    5.34  )-----------( 129      ( 25 Oct 2024 07:44 )             24.1     Hemoglobin: 7.7 g/dL (10-25-24 @ 07:44)  Hemoglobin: 7.7 g/dL (10-24-24 @ 17:10)  Hemoglobin: 7.8 g/dL (10-24-24 @ 08:05)  Hemoglobin: 8.5 g/dL (10-23-24 @ 20:05)  Hemoglobin: 6.2 g/dL (10-23-24 @ 07:54)    10-25    149[H]  |  115[H]  |  27[H]  ----------------------------<  80  3.8   |  23  |  0.90    Ca    8.0[L]      25 Oct 2024 07:44    TPro  4.3[L]  /  Alb  2.4[L]  /  TBili  2.7[H]  /  DBili  x   /  AST  23  /  ALT  15  /  AlkPhos  108  10-25    LIVER FUNCTIONS - ( 25 Oct 2024 07:44 )  Alb: 2.4 g/dL / Pro: 4.3 g/dL / ALK PHOS: 108 U/L / ALT: 15 U/L / AST: 23 U/L / GGT: x             RADIOLOGY & ADDITIONAL STUDIES:      ACC: [id number] EXAM:  CT ANGIO ABD PELV (W)AW IC   ORDERED BY: ALEA VALENTINE     PROCEDURE DATE:  10/23/2024          INTERPRETATION:  CLINICAL INFORMATION: 89 years  Male with GI Bleed    GI   Bleed.    COMPARISON: CT abdomen and pelvis 6/12/2024    CONTRAST/COMPLICATIONS:  IV Contrast: Omnipaque 350  90 cc administered   10 cc discarded  Oral Contrast: NONE  Complications: None reported at time of study completion    PROCEDURE:  CT of the Abdomen and Pelvis was performed.  Precontrast, Arterial and Delayed phases were performed.  Sagittal and coronal reformats were performed.    Limitation: Motion artifact.    FINDINGS:  LOWER CHEST: Small left pleural effusion. Mild cardiomegaly. TAVR.    LIVER: 3.0 cm left lobe cyst.  BILE DUCTS: Normal caliber.  GALLBLADDER: Cholecystectomy.  SPLEEN: Within normal limits.  PANCREAS: Within normal limits.  ADRENALS: Within normal limits.  KIDNEYS/URETERS: 2.9 cm right mid renal parapelvic cyst and 2.1 cm left   mid renal parapelvic cyst. No hydronephrosis. Symmetrical nephrograms.    BLADDER: Within normal limits.  REPRODUCTIVE ORGANS: Mildly enlarged prostate with coarse calcifications.    BOWEL: No bowel obstruction. Appendix  . Constipated rectum measuring up   to 5.6 cm. Perirectal edema suggestive of stercoral proctitis. No   intraluminal contrast extravasation although somewhat limited by motion   artifact.  PERITONEUM/RETROPERITONEUM: Within normal limits.  VESSELS: Within normal limits.  LYMPH NODES: Right lower quadrant calcified mesenteric lymph nodes.  ABDOMINAL WALL: Within normal limits.  BONES: Stable severe L1 compression fracture. Moderate L2 compression   fracture has occurred in the interval. 2 mm osseous retropulsion of the   inferior endplate into the spinal canal. Stable mild L3 compression   fracture. Stable moderate T12 compression fracture.    IMPRESSION:  Small left pleural effusion.    No CT evidence of active GI bleed although somewhat limited by motion   artifact.    Constipated rectum with possible stercoral proctitis.    L2 compression fracture has occurred in the interval since 6/12/2024. 2   mm osseous retropulsion into the spinal canal. Other compression   fractures unchanged.          --- End of Report ---        ADEOLA BRISCOE MD; Attending Radiologist  This document has been electronically signed. Oct 23 2024  3:25PM  10-23-24 @ 13:47       GI Follow up    Patient seen and examined at bedside. He is lethargic, opened eyes to verbal stim.  As per RN no event over night, no bowel movement over night.       MEDICATIONS:   MEDICATIONS  (STANDING):  ferrous    sulfate 325 milliGRAM(s) Oral daily  pantoprazole  Injectable 40 milliGRAM(s) IV Push every 12 hours  sertraline 100 milliGRAM(s) Oral daily  tamsulosin 0.4 milliGRAM(s) Oral at bedtime    MEDICATIONS  (PRN):  acetaminophen     Tablet .. 650 milliGRAM(s) Oral every 6 hours PRN Temp greater or equal to 38C (100.4F), Mild Pain (1 - 3)  aluminum hydroxide/magnesium hydroxide/simethicone Suspension 30 milliLiter(s) Oral every 4 hours PRN Dyspepsia  melatonin 3 milliGRAM(s) Oral at bedtime PRN Insomnia  ondansetron Injectable 4 milliGRAM(s) IV Push every 8 hours PRN Nausea and/or Vomiting      Packed Red Cells Order:  1 Unit  Indication: Anemia due to Active Hemorrhage - Medical Conditions e.  Infuse Unit : 1 Hour (10-23-24 @ 08:27)  Packed Red Cells Order:  1 Unit  Indication: Anemia due to Active Hemorrhage - Medical Conditions e.  Infuse Unit : 1 Hour (10-23-24 @ 08:27)        DIET:  Diet, NPO (10-25-24 @ 10:02) [Active]          ALLERGIES:   Allergies    No Known Allergies    Intolerances        VITAL SIGNS:   Vital Signs Last 24 Hrs  T(C): 36.6 (25 Oct 2024 05:19), Max: 36.6 (24 Oct 2024 13:19)  T(F): 97.9 (25 Oct 2024 05:19), Max: 97.9 (24 Oct 2024 13:19)  HR: 72 (25 Oct 2024 06:40) (69 - 78)  BP: 145/80 (25 Oct 2024 06:40) (145/80 - 172/64)  BP(mean): --  RR: 16 (25 Oct 2024 05:19) (16 - 18)  SpO2: 97% (25 Oct 2024 05:19) (96% - 97%)    Parameters below as of 25 Oct 2024 05:19  Patient On (Oxygen Delivery Method): room air      I&O's Summary      PHYSICAL EXAM:   GENERAL:  No acute distress  HEENT:  NC/AT, conjunctiva clear, sclera anicteric  CHEST:  No increased effort  HEART:  Regular rate  ABDOMEN:  Soft, non-tender, non-distended, positive bowel sounds  EXTREMITIES: +upper extremity edema R>L  SKIN:  Warm, dry  NEURO:  Lethargic     LABS:                        7.7    5.34  )-----------( 129      ( 25 Oct 2024 07:44 )             24.1     Hemoglobin: 7.7 g/dL (10-25-24 @ 07:44)  Hemoglobin: 7.7 g/dL (10-24-24 @ 17:10)  Hemoglobin: 7.8 g/dL (10-24-24 @ 08:05)  Hemoglobin: 8.5 g/dL (10-23-24 @ 20:05)  Hemoglobin: 6.2 g/dL (10-23-24 @ 07:54)    10-25    149[H]  |  115[H]  |  27[H]  ----------------------------<  80  3.8   |  23  |  0.90    Ca    8.0[L]      25 Oct 2024 07:44    TPro  4.3[L]  /  Alb  2.4[L]  /  TBili  2.7[H]  /  DBili  x   /  AST  23  /  ALT  15  /  AlkPhos  108  10-25      LIVER FUNCTIONS - ( 25 Oct 2024 07:44 )  Alb: 2.4 g/dL / Pro: 4.3 g/dL / ALK PHOS: 108 U/L / ALT: 15 U/L / AST: 23 U/L / GGT: x                 RADIOLOGY & ADDITIONAL STUDIES:      ACC: [id number] EXAM:  CT ANGIO ABD PELV (W)AW IC   ORDERED BY: ALEA VALENTINE     PROCEDURE DATE:  10/23/2024          INTERPRETATION:  CLINICAL INFORMATION: 89 years  Male with GI Bleed  GI Bleed.    COMPARISON: CT abdomen and pelvis 6/12/2024    CONTRAST/COMPLICATIONS:  IV Contrast: Omnipaque 350  90 cc administered   10 cc discarded  Oral Contrast: NONE  Complications: None reported at time of study completion    PROCEDURE:  CT of the Abdomen and Pelvis was performed.  Precontrast, Arterial and Delayed phases were performed.  Sagittal and coronal reformats were performed.    Limitation: Motion artifact.    FINDINGS:  LOWER CHEST: Small left pleural effusion. Mild cardiomegaly. TAVR.    LIVER: 3.0 cm left lobe cyst.  BILE DUCTS: Normal caliber.  GALLBLADDER: Cholecystectomy.  SPLEEN: Within normal limits.  PANCREAS: Within normal limits.  ADRENALS: Within normal limits.  KIDNEYS/URETERS: 2.9 cm right mid renal parapelvic cyst and 2.1 cm left   mid renal parapelvic cyst. No hydronephrosis. Symmetrical nephrograms.    BLADDER: Within normal limits.  REPRODUCTIVE ORGANS: Mildly enlarged prostate with coarse calcifications.    BOWEL: No bowel obstruction. Appendix  . Constipated rectum measuring up   to 5.6 cm. Perirectal edema suggestive of stercoral proctitis. No   intraluminal contrast extravasation although somewhat limited by motion   artifact.  PERITONEUM/RETROPERITONEUM: Within normal limits.  VESSELS: Within normal limits.  LYMPH NODES: Right lower quadrant calcified mesenteric lymph nodes.  ABDOMINAL WALL: Within normal limits.  BONES: Stable severe L1 compression fracture. Moderate L2 compression   fracture has occurred in the interval. 2 mm osseous retropulsion of the   inferior endplate into the spinal canal. Stable mild L3 compression   fracture. Stable moderate T12 compression fracture.    IMPRESSION:  Small left pleural effusion.    No CT evidence of active GI bleed although somewhat limited by motion   artifact.    Constipated rectum with possible stercoral proctitis.    L2 compression fracture has occurred in the interval since 6/12/2024. 2   mm osseous retropulsion into the spinal canal. Other compression   fractures unchanged.          --- End of Report ---        ADEOLA BRISCOE MD; Attending Radiologist  This document has been electronically signed. Oct 23 2024  3:25PM  10-23-24 @ 13:47

## 2024-10-25 NOTE — PROGRESS NOTE ADULT - NS ATTEND AMEND GEN_ALL_CORE FT
Agree with assessment and plan as above.    Will continue to manage conservatively. Continue trending Hgb and monitoring for signs of overt GI bleed.     >55 minutes was spent on direct patient care
Patient seen and examined at the bedside. Agree with the assessment and plan of NP Andreas.  Spoke to patient's daughters at bedside. Patient lethargic. Unable to take PO.  Hb stable and no reported active bleeding.  Family favoring palliative care, hospice. Note reviewed. I advised them that in my opinion endoscopic testing would not be beneficial for the patient in his condition. They verbalized understanding.   Please reconsult GI as needed.

## 2024-10-25 NOTE — PROGRESS NOTE ADULT - ASSESSMENT
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   Recent hospitalization two weeks ago for low hemoglobin requiring 2unit  RBC transfusion. Pt  experienced dark black stool upon arrival to ED.             assessment/plans:     hemoptysis, dark stool in ED, GI bleed    stercoral proctitis  - ED course- occult positive , hb6.2  received  2 u prbs.   - CT A/P - No CT evidence of active GI bleed -  limited by motion artifact    -   + stercoral proctitis        On IV zosyn    - GI consulted -  Plans for EGD   - Possible EGD today   - s/s eval   - iv fluids  - hold Plavix  - Cardio consulted- for clearance for endoscopy       - see consult          Metabolic encephalopathy, lethargy   - head ct no acute findings       Hypo-Albuminemia; Severe protein-calorie malnutrition   Dysphagia  - s/s eval - mod diet       Depression   - On Sertraline ( home med)           Palliative ;   as a f/u today , chart reviewed, pt d/w Dr Neely this AM, she then placed call to daughter Laura  this morning and reviewed w/ her that pt is high risk for egd procedure, and to consider focusing on pt comfort at this point.    Daughter requested to speak directly to GI team, Dr Neely notified GI team of this request. Saw pt bedside, he was not interactive, did not open eyes to verbal or tactile stim., he was not in any distress.  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   Recent hospitalization two weeks ago for low hemoglobin requiring 2unit  RBC transfusion. Pt  experienced dark black stool upon arrival to ED.             assessment/plans:     hemoptysis, dark stool in ED, GI bleed    stercoral proctitis  - ED course- occult positive , hb6.2  received  2 u prbs in ER   - CT A/P - No CT evidence of active GI bleed -  limited by motion artifact    -   + stercoral proctitis        On IV zosyn    - GI consulted -  Plans for EGD   -     per GI  EGD on hold as H/H stable today    - s/s eval   - iv fluids  - hold Plavix  - Cardio consulted- for clearance for endoscopy       - see consult          Metabolic encephalopathy, lethargy   - head ct no acute findings       Hypo-Albuminemia; Severe protein-calorie malnutrition   Dysphagia  - s/s eval - mod diet /following       Depression   - On Sertraline ( home med)           Palliative ;   as a f/u today , chart reviewed, pt d/w Dr Neely this AM, she then placed call to daughter Laura  this morning in palliative office,  and reviewed w/ her that pt is high risk for egd procedure, and to consider focusing on pt comfort at this point.    Daughter requested to speak directly to GI team, Dr Neely notified GI team of this request.  I saw pt bedside, he was not interactive, did not open eyes to verbal or tactile stim., he was not in any distress.  Unable to safely take po today due to lethargy .     Per GI  EGD now on hold due to pt high risk, and  H/H stable today .        Will follow w/ med team , family to decide on next steps , aware he is not eating due to lethargy ,  comfort  care recommended .

## 2024-10-25 NOTE — PROGRESS NOTE ADULT - ASSESSMENT
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   Recent hospitalization two weeks ago for low hemoglobin requiring 2unit  RBC transfusion. Pt. experienced dark black stool upon arrival to ED - sent to occult stool. Pt. is afebrile, VSS, alert and oriented. Pt. denies recent falls.  pt unable to provide history. spoke to son, who reports that pt was living at home prior to last hospitalization for covid and GI bleed, ambulates with walker, is able to communicate with full sentences  last admission it was noted that he had slurred speech which resolved after PRB'S, pt noted with slurred speech again today. head ct ordered  was seen by GI lasr admission and decided against endoscopy.      # hemoptysis, dark stool in ED, GI bleed  #  stercoral proctitis.   # goals of care   - ED course- occult positive , hb6.2  receiving 2 u prbs.   - CT A/P - No CT evidence of active GI bleed although somewhat limited by motion artifact.  - will start zosyn for poctitis   - as per cardio pt is high risk for proceudre  - hgb stbale today no plans for any proceudre   - pt is lethergic will hold off on any po intake, until SLP sees ahain  - GI following   - iv fluids  - hold Plavix  - ppi 40iv q12  - hgb around 8 dropped 7.8  - dtr is HCP, pt is DNR/DNI, MOLST completed in ED  - cardio consulted- for clearance for endoscopy       #Metabolic encephalopathy, lethargy, garbled speech - likely du to GI BLEED  head ct no acute findings   asp precautions  npo  iv fluids  monitor  pt has been receiving  Procrit by hematology dr. diallo      #Hypo-Albuminemia; Severe protein-calorie malnutrition   #Dysphagia  - Likely malnutrition and age related debility   - npo for now due charles GI bleed  - SLP consulted      #Hyper-Bili   - Could be hemolyzing   - Monitor  - GI to follow    #HTN  #TAVR   - BP boderline  - hold Metoprolol, losartan   - Holding Plavix for now     #BPH  - Cont Flomax     #Depression   - Sertraline     #DVT ppx  - SCDs given positive FOBT    GOC: DNR/I- MOLST completed in ED     spoke at length to the daughter at bedside and gave him an update regarding ongoing treatment and medical care   discussed comfort measures family isn't ready to make that decision          89-year-old male with history of MDS, HTN, [place], Alzheimer's, depression, s/p TAVR on Plavix,  BIBA from [facility], with hemoptysis this morning at the rehab prompted Hbg check of 5.5.   Recent hospitalization two weeks ago for low hemoglobin requiring 2unit  RBC transfusion. Pt. experienced dark black stool upon arrival to ED - sent to occult stool. Pt. is afebrile, VSS, alert and oriented. Pt. denies recent falls.  pt unable to provide history. spoke to son, who reports that pt was living at home prior to last hospitalization for covid and GI bleed, ambulates with walker, is able to communicate with full sentences  last admission it was noted that he had slurred speech which resolved after PRB'S, pt noted with slurred speech again today. head ct ordered  was seen by GI lasr admission and decided against endoscopy.      # hemoptysis, dark stool in ED, GI bleed  #  stercoral proctitis.   # goals of care   - ED course- occult positive , hb6.2  receiving 2 u prbs.   - CT A/P - No CT evidence of active GI bleed although somewhat limited by motion artifact.  - will start zosyn for poctitis   - as per cardio pt is high risk for proceudre  - hgb stbale today no plans for any proceudre   - pt is lethergic will hold off on any po intake, until SLP sees ahain  - GI following   - iv fluids  - hold Plavix  - ppi 40iv q12  - hgb around 8 dropped 7.8  - dtr is HCP, pt is DNR/DNI, MOLST completed in ED  - cardio consulted- for clearance for endoscopy       #Metabolic encephalopathy, lethargy, garbled speech - likely du to GI BLEED  head ct no acute findings   asp precautions  npo  iv fluids  monitor  pt has been receiving  Procrit by hematology dr. diallo      #Hypo-Albuminemia; Severe protein-calorie malnutrition   #Dysphagia  - Likely malnutrition and age related debility   - npo for now due charles GI bleed  - SLP consulted      #Hyper-Bili   - Could be hemolyzing   - Monitor  - GI to follow    #HTN  #TAVR   - BP boderline  - hold Metoprolol, losartan   - Holding Plavix for now     #BPH  - Cont Flomax     #Depression   - Sertraline     #DVT ppx  - SCDs given positive FOBT    GOC: DNR/I- MOLST completed in ED     spoke at length to the daughter on the phone and gave him an update regarding ongoing treatment and medical care   discussed comfort measures family isn't ready to make that decision          89-year-old male with history of MDS, HTN, [place], Alzheimer's, depression, s/p TAVR on Plavix,  BIBA from [facility], with hemoptysis this morning at the rehab prompted Hbg check of 5.5.   Recent hospitalization two weeks ago for low hemoglobin requiring 2unit  RBC transfusion. Pt. experienced dark black stool upon arrival to ED - sent to occult stool. Pt. is afebrile, VSS, alert and oriented. Pt. denies recent falls.  pt unable to provide history. spoke to son, who reports that pt was living at home prior to last hospitalization for covid and GI bleed, ambulates with walker, is able to communicate with full sentences  last admission it was noted that he had slurred speech which resolved after PRB'S, pt noted with slurred speech again today. head ct ordered  was seen by GI lasr admission and decided against endoscopy.      # hemoptysis, dark stool in ED, GI bleed  #  stercoral proctitis.   # goals of care   - ED course- occult positive , hb6.2  receiving 2 u prbs.   - CT A/P - No CT evidence of active GI bleed although somewhat limited by motion artifact.  - as per cardio pt is high risk for procedure  - hgb stbale today no plans for any procedure   - pt is lethergic will hold off on any po intake, until SLP sees ahain  - GI following   - iv fluids  - hold Plavix  - ppi 40iv q12  - hgb around 8 dropped 7.8  - dtr is HCP, pt is DNR/DNI, MOLST completed in ED  - cardio consulted- for clearance for endoscopy       #Metabolic encephalopathy, lethargy, garbled speech - likely du to GI BLEED  head ct no acute findings   asp precautions  npo  iv fluids  monitor  pt has been receiving  Procrit by hematology dr. diallo      #Hypo-Albuminemia; Severe protein-calorie malnutrition   #Dysphagia  - Likely malnutrition and age related debility   - npo for now due charles GI bleed  - SLP consulted      #Hyper-Bili   - Could be hemolyzing   - Monitor  - GI to follow    #HTN  #TAVR   - BP boderline  - hold Metoprolol, losartan   - Holding Plavix for now     #BPH  - Cont Flomax     #Depression   - Sertraline     #DVT ppx  - SCDs given positive FOBT    GOC: DNR/I- MOLST completed in ED     spoke at length to the daughter on the phone and gave him an update regarding ongoing treatment and medical care   discussed comfort measures family isn't ready to make that decision

## 2024-10-25 NOTE — PROGRESS NOTE ADULT - SUBJECTIVE AND OBJECTIVE BOX
Progress:  pt remains lethargic this am, did not open eyes for me w/ stim , verbal or tactile, was not in distress       Review of Systems: [ Unable to obtain due to poor mentation]    MEDICATIONS  (STANDING):  ferrous    sulfate 325 milliGRAM(s) Oral daily  pantoprazole  Injectable 40 milliGRAM(s) IV Push every 12 hours  sertraline 100 milliGRAM(s) Oral daily  tamsulosin 0.4 milliGRAM(s) Oral at bedtime    MEDICATIONS  (PRN):  acetaminophen     Tablet .. 650 milliGRAM(s) Oral every 6 hours PRN Temp greater or equal to 38C (100.4F), Mild Pain (1 - 3)  aluminum hydroxide/magnesium hydroxide/simethicone Suspension 30 milliLiter(s) Oral every 4 hours PRN Dyspepsia  melatonin 3 milliGRAM(s) Oral at bedtime PRN Insomnia  ondansetron Injectable 4 milliGRAM(s) IV Push every 8 hours PRN Nausea and/or Vomiting      PHYSICAL EXAM:  Vital Signs Last 24 Hrs  T(C): 36.6 (25 Oct 2024 05:19), Max: 36.6 (24 Oct 2024 13:19)  T(F): 97.9 (25 Oct 2024 05:19), Max: 97.9 (24 Oct 2024 13:19)  HR: 72 (25 Oct 2024 06:40) (69 - 78)  BP: 145/80 (25 Oct 2024 06:40) (145/80 - 172/64)  BP(mean): --  RR: 16 (25 Oct 2024 05:19) (16 - 18)  SpO2: 97% (25 Oct 2024 05:19) (96% - 97%)    Parameters below as of 25 Oct 2024 05:19  Patient On (Oxygen Delivery Method): room air      General: asleep, did not wake w/ stim , not interactive      HEENT: n/c, a/t     Lungs:  es cl dim to bases, not labored    CV: normal  rate  GI: abd soft, n/t     : normal /harris   Musculoskeletal: some edema , weakened    Skin: w/d pale     Neuro: pt was asleep, did not wake for me w/ verbal or tactile stim    Oral intake ability:  oral feeding - assist needed  Diet: NPO- as edgar      LABS:                          7.7    5.34  )-----------( 129      ( 25 Oct 2024 07:44 )             24.1     10-25    149[H]  |  115[H]  |  27[H]  ----------------------------<  80  3.8   |  23  |  0.90    Ca    8.0[L]      25 Oct 2024 07:44    TPro  4.3[L]  /  Alb  2.4[L]  /  TBili  2.7[H]  /  DBili  x   /  AST  23  /  ALT  15  /  AlkPhos  108  10-25    Urinalysis Basic - ( 25 Oct 2024 07:44 )    Color: x / Appearance: x / SG: x / pH: x  Gluc: 80 mg/dL / Ketone: x  / Bili: x / Urobili: x   Blood: x / Protein: x / Nitrite: x   Leuk Esterase: x / RBC: x / WBC x   Sq Epi: x / Non Sq Epi: x / Bacteria: x        RADIOLOGY & ADDITIONAL STUDIES:     ADVANCE DIRECTIVES: molst dnr/dni   Advanced Care Planning discussion total time spent:

## 2024-10-25 NOTE — PROGRESS NOTE ADULT - PROBLEM SELECTOR PLAN 1
GIB (gastrointestinal bleeding).   S/P 2 units PRBCS  CTA results no active GIB   Keep active type and screen  Monitor H&H  Transfuse if Hb less than 7   Cont PPI BID  Discussed patient condition with daughter, Plan to continue supportive care unless condition changes   Cardiology consult appreciated GIB (gastrointestinal bleeding).   S/P 2 units PRBCs  CTA results no active GIB   Keep active type and screen  Monitor H&H  Transfuse if Hb less than 7   Cont PPI BID  Discussed patient condition with daughter, Plan to continue supportive care unless condition changes   Cardiology consult appreciated

## 2024-10-25 NOTE — PROGRESS NOTE ADULT - ASSESSMENT
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   Recent hospitalization two weeks ago for low hemoglobin requiring 2unit  RBC transfusion. Pt. experienced dark black stool upon arrival to ED - sent to occult stool. Pt. is afebrile, VSS, alert and oriented. Pt. denies recent falls.      GI consulted to see patient due to GIB, patient known from previous admission. Patient noted to have hematemesis at facility and melena with clots in ER. No recent EGDs  or colonoscopy reported.   Spoke with daughter Ana on the phone . Answered all her questions, after long discussion she agree with supportive care, hold off GI procedure unless any acute episode.

## 2024-10-25 NOTE — PROGRESS NOTE ADULT - SUBJECTIVE AND OBJECTIVE BOX
PROGRESS NOTE:     Patient is a 89y old  Male who presents with a chief complaint of gi bleed AMS (25 Oct 2024 10:49)      SUBJECTIVE / OVERNIGHT EVENTS: pt seen adn evaluated   very lethargic and sleeping    ADDITIONAL REVIEW OF SYSTEMS:    MEDICATIONS  (STANDING):  ferrous    sulfate 325 milliGRAM(s) Oral daily  pantoprazole  Injectable 40 milliGRAM(s) IV Push every 12 hours  sertraline 100 milliGRAM(s) Oral daily  tamsulosin 0.4 milliGRAM(s) Oral at bedtime    MEDICATIONS  (PRN):  acetaminophen     Tablet .. 650 milliGRAM(s) Oral every 6 hours PRN Temp greater or equal to 38C (100.4F), Mild Pain (1 - 3)  aluminum hydroxide/magnesium hydroxide/simethicone Suspension 30 milliLiter(s) Oral every 4 hours PRN Dyspepsia  melatonin 3 milliGRAM(s) Oral at bedtime PRN Insomnia  ondansetron Injectable 4 milliGRAM(s) IV Push every 8 hours PRN Nausea and/or Vomiting      CAPILLARY BLOOD GLUCOSE        I&O's Summary      PHYSICAL EXAM:  Vital Signs Last 24 Hrs  T(C): 36.6 (25 Oct 2024 05:19), Max: 36.6 (24 Oct 2024 13:19)  T(F): 97.9 (25 Oct 2024 05:19), Max: 97.9 (24 Oct 2024 13:19)  HR: 72 (25 Oct 2024 06:40) (69 - 78)  BP: 145/80 (25 Oct 2024 06:40) (145/80 - 172/64)  BP(mean): --  RR: 16 (25 Oct 2024 05:19) (16 - 18)  SpO2: 97% (25 Oct 2024 05:19) (96% - 97%)    Parameters below as of 25 Oct 2024 05:19  Patient On (Oxygen Delivery Method): room air        GENERAL- NAD  EAR/NOSE/MOUTH/THROAT -  MMM  EYES- [place], conjunctiva and Sclera clear  NECK- supple  RESPIRATORY-  clear to auscultation bilaterally  CARDIOVASCULAR - SIS2, RRR  GI - soft NT BS present  EXTREMITIES- no pedal edema  NEUROLOGY- no gross focal deficits, garbled speech   PSYCHIATRY- AAO X 0        LABS:                        7.7    5.34  )-----------( 129      ( 25 Oct 2024 07:44 )             24.1     10-25    149[H]  |  115[H]  |  27[H]  ----------------------------<  80  3.8   |  23  |  0.90    Ca    8.0[L]      25 Oct 2024 07:44    TPro  4.3[L]  /  Alb  2.4[L]  /  TBili  2.7[H]  /  DBili  x   /  AST  23  /  ALT  15  /  AlkPhos  108  10-25          Urinalysis Basic - ( 25 Oct 2024 07:44 )    Color: x / Appearance: x / SG: x / pH: x  Gluc: 80 mg/dL / Ketone: x  / Bili: x / Urobili: x   Blood: x / Protein: x / Nitrite: x   Leuk Esterase: x / RBC: x / WBC x   Sq Epi: x / Non Sq Epi: x / Bacteria: x          RADIOLOGY & ADDITIONAL TESTS:  Results Reviewed:  yes  Imaging Personally Reviewed: yes   Electrocardiogram Personally Reviewed: yes    COORDINATION OF CARE:  Care Discussed with Consultants/Other Providers [Y/N]: yes   Prior or Outpatient Records Reviewed [Y/N]: yes

## 2024-10-26 PROCEDURE — 99233 SBSQ HOSP IP/OBS HIGH 50: CPT

## 2024-10-26 RX ORDER — MORPHINE SULFATE 30 MG/1
2 TABLET, EXTENDED RELEASE ORAL ONCE
Refills: 0 | Status: DISCONTINUED | OUTPATIENT
Start: 2024-10-26 | End: 2024-10-26

## 2024-10-26 RX ORDER — GLYCERIN/PROPYLENE GLYCOL 0.6 %-0.6%
1 DROPPERETTE, SINGLE-USE DROP DISPENSER OPHTHALMIC (EYE) THREE TIMES A DAY
Refills: 0 | Status: DISCONTINUED | OUTPATIENT
Start: 2024-10-26 | End: 2024-10-29

## 2024-10-26 RX ADMIN — Medication 75 MILLILITER(S): at 14:26

## 2024-10-26 RX ADMIN — PANTOPRAZOLE SODIUM 40 MILLIGRAM(S): 40 TABLET, DELAYED RELEASE ORAL at 05:35

## 2024-10-26 RX ADMIN — PANTOPRAZOLE SODIUM 40 MILLIGRAM(S): 40 TABLET, DELAYED RELEASE ORAL at 17:26

## 2024-10-26 NOTE — PROGRESS NOTE ADULT - SUBJECTIVE AND OBJECTIVE BOX
PROGRESS NOTE:     Patient is a 89y old  Male who presents with a chief complaint of gi bleed AMS (25 Oct 2024 12:31)      SUBJECTIVE / OVERNIGHT EVENTS: pt seen and evaluated  still very lethargic and only arousble to verbal stimulai      ADDITIONAL REVIEW OF SYSTEMS:    MEDICATIONS  (STANDING):  ferrous    sulfate 325 milliGRAM(s) Oral daily  morphine   Solution 2 milliGRAM(s) Oral once  pantoprazole  Injectable 40 milliGRAM(s) IV Push every 12 hours  sertraline 100 milliGRAM(s) Oral daily  tamsulosin 0.4 milliGRAM(s) Oral at bedtime    MEDICATIONS  (PRN):  acetaminophen     Tablet .. 650 milliGRAM(s) Oral every 6 hours PRN Temp greater or equal to 38C (100.4F), Mild Pain (1 - 3)  aluminum hydroxide/magnesium hydroxide/simethicone Suspension 30 milliLiter(s) Oral every 4 hours PRN Dyspepsia  melatonin 3 milliGRAM(s) Oral at bedtime PRN Insomnia  ondansetron Injectable 4 milliGRAM(s) IV Push every 8 hours PRN Nausea and/or Vomiting      CAPILLARY BLOOD GLUCOSE        I&O's Summary      PHYSICAL EXAM:  Vital Signs Last 24 Hrs  T(C): 36.4 (26 Oct 2024 05:08), Max: 36.4 (26 Oct 2024 05:08)  T(F): 97.6 (26 Oct 2024 05:08), Max: 97.6 (26 Oct 2024 05:08)  HR: 73 (26 Oct 2024 05:08) (73 - 73)  BP: 170/71 (26 Oct 2024 05:08) (170/71 - 170/71)  BP(mean): --  RR: 16 (26 Oct 2024 05:08) (16 - 16)  SpO2: 95% (26 Oct 2024 05:08) (95% - 95%)    Parameters below as of 26 Oct 2024 05:08  Patient On (Oxygen Delivery Method): room air      GENERAL- resting in bed   EAR/NOSE/MOUTH/THROAT -  dry  membranes   EYES= conjunctiva and Sclera clear  NECK- supple  RESPIRATORY-  clear to auscultation bilaterally  CARDIOVASCULAR - SIS2, RRR  GI - soft NT BS present  EXTREMITIES- no pedal edema  NEUROLOGY- no gross focal deficits only arousble to verbal/ physical stimulai   PSYCHIATRY- AAO X 0    LABS:                        7.7    5.34  )-----------( 129      ( 25 Oct 2024 07:44 )             24.1     10-25    149[H]  |  115[H]  |  27[H]  ----------------------------<  80  3.8   |  23  |  0.90    Ca    8.0[L]      25 Oct 2024 07:44    TPro  4.3[L]  /  Alb  2.4[L]  /  TBili  2.7[H]  /  DBili  x   /  AST  23  /  ALT  15  /  AlkPhos  108  10-25          Urinalysis Basic - ( 25 Oct 2024 07:44 )    Color: x / Appearance: x / SG: x / pH: x  Gluc: 80 mg/dL / Ketone: x  / Bili: x / Urobili: x   Blood: x / Protein: x / Nitrite: x   Leuk Esterase: x / RBC: x / WBC x   Sq Epi: x / Non Sq Epi: x / Bacteria: x          RADIOLOGY & ADDITIONAL TESTS:  Results Reviewed: yes   Imaging Personally Reviewed: yes  Electrocardiogram Personally Reviewed: yes    COORDINATION OF CARE:  Care Discussed with Consultants/Other Providers [Y/N]: yes  Prior or Outpatient Records Reviewed [Y/N]: yes

## 2024-10-26 NOTE — PROGRESS NOTE ADULT - ASSESSMENT
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   Recent hospitalization two weeks ago for low hemoglobin requiring 2unit  RBC transfusion. Pt. experienced dark black stool upon arrival to ED - sent to occult stool. Pt. is afebrile, VSS, alert and oriented. Pt. denies recent falls.  pt unable to provide history. spoke to son, who reports that pt was living at home prior to last hospitalization for covid and GI bleed, ambulates with walker, is able to communicate with full sentences  last admission it was noted that he had slurred speech which resolved after PRB'S, pt noted with slurred speech again today. head ct ordered  was seen by GI lasr admission and decided against endoscopy.      # hemoptysis, dark stool in ED, GI bleed  #  stercoral proctitis.   # goals of care   - ED course- occult positive , hb6.2  receivied 2 u prbs.   - CT A/P - No CT evidence of active GI bleed although somewhat limited by motion artifact.  - as per cardio pt is high risk for procedure  - hgb stbale today no plans for any procedure   - pt is lethergic will hold off on any po intake  - SLP attempted to see him with daughter at bedside and patient was too lethargic to participate in any evaluation  - GI following, his EGD was cancelled yesterday due to stabilization of hgb. They recommended conservative supportive managment. GI team spoke with the daughter at length and discussed plans with her   - continue to hold Plavix  - ppi 40iv q12  - hgb around 8 dropped 7.8 and stablizied yesterday  - dtr is HCP, pt is DNR/DNI, MOLST completed in ED    I had a along discussion with the daughter yesterday regarding GOC- daughter agreed to stop getting blood draws for now/ transfusions . She still needs to speak to her family before agreeble to comfort measures   she is aware of her father being restless this am and being uncomfortbaly agreed for 1 dose of morphine to be given.       #Metabolic encephalopathy, lethargy, garbled speech - likely du to GI BLEED  head ct no acute findings   asp precautions  npo  iv fluids  monitor  pt has been receiving  Procrit by hematology dr. diallo      #Hypo-Albuminemia; Severe protein-calorie malnutrition   #Dysphagia  - Likely malnutrition and age related debility   - npo for now due charles GI bleed  - SLP consulted- pt is too lethergic for evaluation       #Hyper-Bili   - Monitor  - GI input appreciated     #HTN  #TAVR   - Metoprolol, losartan consider restarting when pt more aware   - Holding Plavix for now     #BPH  - Cont Flomax     #Depression   - Sertraline     #DVT ppx  - SCDs given positive FOBT    GOC: DNR/I- MOLST completed in ED     spoke at length to the daughter on the phone and gave him an update regarding ongoing treatment and medical care   discussed comfort measures family isn't ready to make that decision - daughter is concerned regarding her father being in pain this am , and being restless this am as per assessment. she agreed for 1 dose of morphine which is ordered     dispo- likely back to [facility] pending above

## 2024-10-27 PROCEDURE — 99233 SBSQ HOSP IP/OBS HIGH 50: CPT

## 2024-10-27 RX ORDER — MORPHINE SULFATE 30 MG/1
2 TABLET, EXTENDED RELEASE ORAL ONCE
Refills: 0 | Status: DISCONTINUED | OUTPATIENT
Start: 2024-10-27 | End: 2024-10-27

## 2024-10-27 RX ORDER — LORAZEPAM 2 MG
1 TABLET ORAL ONCE
Refills: 0 | Status: DISCONTINUED | OUTPATIENT
Start: 2024-10-27 | End: 2024-10-27

## 2024-10-27 RX ADMIN — Medication 1 DROP(S): at 06:18

## 2024-10-27 RX ADMIN — PANTOPRAZOLE SODIUM 40 MILLIGRAM(S): 40 TABLET, DELAYED RELEASE ORAL at 05:42

## 2024-10-27 RX ADMIN — MORPHINE SULFATE 2 MILLIGRAM(S): 30 TABLET, EXTENDED RELEASE ORAL at 20:21

## 2024-10-27 RX ADMIN — Medication 1 DROP(S): at 13:50

## 2024-10-27 RX ADMIN — Medication 75 MILLILITER(S): at 05:42

## 2024-10-27 RX ADMIN — PANTOPRAZOLE SODIUM 40 MILLIGRAM(S): 40 TABLET, DELAYED RELEASE ORAL at 17:27

## 2024-10-27 RX ADMIN — MORPHINE SULFATE 2 MILLIGRAM(S): 30 TABLET, EXTENDED RELEASE ORAL at 19:39

## 2024-10-27 NOTE — PROGRESS NOTE ADULT - SUBJECTIVE AND OBJECTIVE BOX
PROGRESS NOTE:     Patient is a 89y old  Male who presents with a chief complaint of gi bleed AMS (26 Oct 2024 11:04)      SUBJECTIVE / OVERNIGHT EVENTS: pt was seen this am  he was agitated overnight and very uncomfortably; family was contacted.  he was redirected.  this am he was again very agitated, uncomfortably. spoke for over 20 min with HCP daughter and we agreed to medicate the patient once for anxiety/ pain.  family/aid showed up shortly at bedside and refused the medications. pt was able to be redirected when familiar people are around    again I spent another 30 min with the son jacob at bedside and daughter HCP on the phone at [phone] discussing the case. The daughter is very concerned about his father symptoms and wants him to be pain free and comfortable. however they both very unsure if they want to commit to hospice/ comfort measures now.  Son jacob and daughter HCP are requesting for speech eval- order is in and they have been contacted to see if he can tolerate po. pt have been made npo as of friday for lethergy, he was started of IVF due to being npo. Family is aware    hgb have been stable as of Friday- family is requested repeat cbc in cathy am which is ordered    hospice referral is in as family have a lot of questions     will continue to follow up in the am     ADDITIONAL REVIEW OF SYSTEMS: as per HPI    MEDICATIONS  (STANDING):  artificial  tears Solution 1 Drop(s) Both EYES three times a day  dextrose 5% + sodium chloride 0.9%. 1000 milliLiter(s) (75 mL/Hr) IV Continuous <Continuous>  ferrous    sulfate 325 milliGRAM(s) Oral daily  pantoprazole  Injectable 40 milliGRAM(s) IV Push every 12 hours  sertraline 100 milliGRAM(s) Oral daily  tamsulosin 0.4 milliGRAM(s) Oral at bedtime    MEDICATIONS  (PRN):  acetaminophen     Tablet .. 650 milliGRAM(s) Oral every 6 hours PRN Temp greater or equal to 38C (100.4F), Mild Pain (1 - 3)  aluminum hydroxide/magnesium hydroxide/simethicone Suspension 30 milliLiter(s) Oral every 4 hours PRN Dyspepsia  melatonin 3 milliGRAM(s) Oral at bedtime PRN Insomnia  ondansetron Injectable 4 milliGRAM(s) IV Push every 8 hours PRN Nausea and/or Vomiting      CAPILLARY BLOOD GLUCOSE        I&O's Summary    26 Oct 2024 07:01  -  27 Oct 2024 07:00  --------------------------------------------------------  IN: 975 mL / OUT: 0 mL / NET: 975 mL        PHYSICAL EXAM:  Vital Signs Last 24 Hrs  T(C): 36.4 (27 Oct 2024 12:07), Max: 36.4 (27 Oct 2024 12:07)  T(F): 97.5 (27 Oct 2024 12:07), Max: 97.5 (27 Oct 2024 12:07)  HR: 65 (27 Oct 2024 12:07) (65 - 69)  BP: 147/53 (27 Oct 2024 12:07) (147/53 - 159/64)  BP(mean): --  RR: 18 (27 Oct 2024 12:07) (16 - 18)  SpO2: 96% (27 Oct 2024 12:07) (96% - 97%)    Parameters below as of 27 Oct 2024 12:07  Patient On (Oxygen Delivery Method): room air          LABS:  n/a             PROGRESS NOTE:     Patient is a 89y old  Male who presents with a chief complaint of gi bleed AMS (26 Oct 2024 11:04)      SUBJECTIVE / OVERNIGHT EVENTS: pt was seen this am  he was agitated overnight and very uncomfortably; family was contacted.  he was redirected.  this am he was again very agitated, uncomfortably. spoke for over 20 min with HCP daughter and we agreed to medicate the patient once for anxiety/ pain.  family/aid showed up shortly at bedside and refused the medications. pt was able to be redirected when familiar people are around    again I spent another 30 min with the son jacob at bedside and daughter HCP on the phone at [phone] discussing the case. The daughter is very concerned about his father symptoms and wants him to be pain free and comfortable. however they both very unsure if they want to commit to hospice/ comfort measures now.  Son jacob and daughter HCP are requesting for speech eval- order is in and they have been contacted to see if he can tolerate po. pt have been made npo as of friday for lethergy, he was started of IVF due to being npo. Family is aware    hgb have been stable as of Friday- family is requested repeat cbc in cathy am which is ordered    hospice referral is in as family have a lot of questions     will continue to follow up in the am     ADDITIONAL REVIEW OF SYSTEMS: as per HPI    MEDICATIONS  (STANDING):  artificial  tears Solution 1 Drop(s) Both EYES three times a day  dextrose 5% + sodium chloride 0.9%. 1000 milliLiter(s) (75 mL/Hr) IV Continuous <Continuous>  ferrous    sulfate 325 milliGRAM(s) Oral daily  pantoprazole  Injectable 40 milliGRAM(s) IV Push every 12 hours  sertraline 100 milliGRAM(s) Oral daily  tamsulosin 0.4 milliGRAM(s) Oral at bedtime    MEDICATIONS  (PRN):  acetaminophen     Tablet .. 650 milliGRAM(s) Oral every 6 hours PRN Temp greater or equal to 38C (100.4F), Mild Pain (1 - 3)  aluminum hydroxide/magnesium hydroxide/simethicone Suspension 30 milliLiter(s) Oral every 4 hours PRN Dyspepsia  melatonin 3 milliGRAM(s) Oral at bedtime PRN Insomnia  ondansetron Injectable 4 milliGRAM(s) IV Push every 8 hours PRN Nausea and/or Vomiting      CAPILLARY BLOOD GLUCOSE        I&O's Summary    26 Oct 2024 07:01  -  27 Oct 2024 07:00  --------------------------------------------------------  IN: 975 mL / OUT: 0 mL / NET: 975 mL        PHYSICAL EXAM:  Vital Signs Last 24 Hrs  T(C): 36.4 (27 Oct 2024 12:07), Max: 36.4 (27 Oct 2024 12:07)  T(F): 97.5 (27 Oct 2024 12:07), Max: 97.5 (27 Oct 2024 12:07)  HR: 65 (27 Oct 2024 12:07) (65 - 69)  BP: 147/53 (27 Oct 2024 12:07) (147/53 - 159/64)  BP(mean): --  RR: 18 (27 Oct 2024 12:07) (16 - 18)  SpO2: 96% (27 Oct 2024 12:07) (96% - 97%)    Parameters below as of 27 Oct 2024 12:07  Patient On (Oxygen Delivery Method): room air      GENERAL- resting in bed,   EAR/NOSE/MOUTH/THROAT -  dry  membranes   EYES= conjunctiva and Sclera clear  NECK- supple  RESPIRATORY-  clear to auscultation bilaterally  CARDIOVASCULAR - SIS2, RRR  GI - soft NT BS present  EXTREMITIES- no pedal edema  NEUROLOGY- no gross focal deficits   PSYCHIATRY- AAO X 0    LABS:  n/a

## 2024-10-27 NOTE — SWALLOW BEDSIDE ASSESSMENT ADULT - SLP PERTINENT HISTORY OF CURRENT PROBLEM
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 Recent hospitalization two weeks ago for low hemoglobin requiring 2unit  RBC transfusion. Pt. experienced dark black stool upon arrival to ED - sent to occult stool. Pt. is afebrile, VSS, alert and oriented. Pt. denies recent falls.pt unable to provide history. spoke to son, who reports that pt was living at home prior to last hospitalization for covid and GI bleed, ambulates with walker, is able to communicate with full sentences last admission it was noted that he had slurred speech which resolved after PRB'S. head ct ordered was seen by GI last admission and decided against endoscopy. ED course- occult positive , receiving 2 u prbs.  GI  consulted. son and dtr would like to speak to GI re: endoscopy at this time. dtr is HCP,  DNR/DNI

## 2024-10-27 NOTE — PROVIDER CONTACT NOTE (OTHER) - ASSESSMENT
patient is AOx1, confused. requesting water. educated about NPO status. Noticed from hello2 that family was giving patient water multiple times. Dtr Estephania stated she was just using a sponge to moisten patient's mouth. Another family member in room reported patient was coughing after. Patient does not has any sign of distress.

## 2024-10-27 NOTE — PROGRESS NOTE ADULT - ASSESSMENT
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   Recent hospitalization two weeks ago for low hemoglobin requiring 2unit  RBC transfusion. Pt. experienced dark black stool upon arrival to ED - sent to occult stool. Pt. is afebrile, VSS, alert and oriented. Pt. denies recent falls.  pt unable to provide history. spoke to son, who reports that pt was living at home prior to last hospitalization for covid and GI bleed, ambulates with walker, is able to communicate with full sentences  last admission it was noted that he had slurred speech which resolved after PRB'S, pt noted with slurred speech again today. head ct ordered  was seen by GI lasr admission and decided against endoscopy.      # hemoptysis, dark stool in ED, GI bleed  #  stercoral proctitis.   # goals of care   - ED course- occult positive , hb6.2  receivied 2 u prbs.   - CT A/P - No CT evidence of active GI bleed although somewhat limited by motion artifact.  - as per cardio pt is high risk for procedure  - hgb stbale today no plans for any procedure   - pt is lethergic will hold off on any po intake  - SLP attempted to see him with daughter at bedside and patient was too lethargic to participate in any evaluation  - GI following, his EGD was cancelled due to stabilization of hgb. They recommended conservative supportive managment. GI team spoke with the daughter at length and discussed plans with her   - continue to hold Plavix  - ppi 40iv q12  - hgb around 8 dropped 7.8 and stablizied   - dtr is HCP, pt is DNR/DNI, MOLST completed in ED      #Metabolic encephalopathy, lethargy, garbled speech - likely du to GI BLEED  head ct no acute findings   asp precautions  npo  iv fluids  monitor  pt has been receiving  Procrit by hematology dr. diallo      #Hypo-Albuminemia; Severe protein-calorie malnutrition   #Dysphagia  - Likely malnutrition and age related debility   - npo for now due charles GI bleed  - SLP consulted- pt is too lethergic for evaluation       #Hyper-Bili   - Monitor  - GI input appreciated     #HTN  #TAVR   - Metoprolol, losartan consider restarting when pt more aware   - Holding Plavix for now     #BPH  - Cont Flomax     #Depression   - Sertraline     #DVT ppx  - SCDs given positive FOBT    GOC: DNR/I- MOLST completed in ED     Family requesting speech to evaluated the patient today- order is in and they have been contacted. pt is currently npo given lethargy and risk of aspiration  family will speak to hospice before making decision. referral is in and hospice laision is awre  family is also requesting repeat cbc which is ordered for the morning

## 2024-10-27 NOTE — SWALLOW BEDSIDE ASSESSMENT ADULT - SLP GENERAL OBSERVATIONS
Pt received upright in bed, family members and private RN at bedside. Family receptive/agreeable to comfort care measures, however, were confirming with family member not present at time of assessment.

## 2024-10-27 NOTE — SWALLOW BEDSIDE ASSESSMENT ADULT - COMMENTS
WBC: 5.34     Head CT-IMPRESSION:  Mild chronic microvascular changes without evidence of an      acute transcortical infarction or hemorrhage.     Chest Xray 10/23/24: IMPRESSION: Slight fluid at left base at this time.

## 2024-10-27 NOTE — SWALLOW BEDSIDE ASSESSMENT ADULT - SWALLOW EVAL: DIAGNOSIS
Patient presents with oropharyngeal dysphagia in the presence of acute illness, deconditioning and suboptimal cognitive status. Pt noted with improved overall arousal/alertness compared to previous assessment attempt. Oral care provided. Pt accepted po trials of thin liquids and 1/2 teaspoon puree solids.  Pt exhibited reduced oral acceptance/orientation to cup/utensil, intermittent partial anterior loss of thin liquid bolus, improved as trials progressed. Suspect delay in A-P transport. Pharyngeal motor response appreciated via digital palpation. No overt s/s of aspiration/penetration. Recommend to initiate puree/thin pleasure feeds with strict aspiration precautions with 1:1 feeds/supervision with intake, strict oral care, following agreement from family for comfort care measures. Pt will remain NPO until comfort care initiated. Discussed with MD/RN/Patient family at beside and private RN

## 2024-10-27 NOTE — CHART NOTE - NSCHARTNOTEFT_GEN_A_CORE
Informed by nurse. Patient is agitated and trying to get out of the bed.    Patient was seen and examined at bedside. Patient not oriented. noted restless, Denies any pain.   No aggressive at the time. Tried to reoriented the patient. Night team will continue to monitor.     Talked with Daughter by phone, explained to her his symptoms that maybe caused by discomfort . She agreed to Morphine is patient continue uncomfortable.  Daughter requested Artifical Tears, that patient regularly uses at home.
Daughter who is the HCP was at bedside.     I have had multiple discussions throughout the day with her about her father.  We discussed needs for EGD/ family was unsure. later during the day GI decided against given hgb stabilization and patient being high risk.  pt daughter requested they speak with GI, NP spoke with the daughter and updated regarding the plan    I had another discussion later in the afternoon, I explained to the daughter the patient lethargy, failure to thrive and pt inability to be aroused to take any po medications or eat  Speech and swallow came to bedside and failed to assess him given increased letheragy    Daughter was at bedside around 230 and I had a long discussion with her regarding GOC- daughter verbalized understanding over her father prognosis. She was concerned over his symptoms and wanted him to be comfortable, however she stated that she had to speak with 2 brothers who are out of the country and will get back to the medical team      She did agree to stop daily blood draws though. no morning labs are ordered as such  Will continue to assess the patient and update the family
Rn stated that the HCP is requesting morphine at this time, put in a one time order for  2mg morphine.

## 2024-10-27 NOTE — PROVIDER CONTACT NOTE (OTHER) - BACKGROUND
patient is to maintain NPO after seen by SLP, only pleasure feed with supervision if patient is made comfort care. Patient is not on comfort care.

## 2024-10-27 NOTE — SWALLOW BEDSIDE ASSESSMENT ADULT - SWALLOW EVAL: RECOMMENDED DIET
NPO; initiate pleasure feeds via thin/puree, 1:1 supervision if family agreeable to comfort care measures

## 2024-10-28 ENCOUNTER — TRANSCRIPTION ENCOUNTER (OUTPATIENT)
Age: 89
End: 2024-10-28

## 2024-10-28 VITALS
TEMPERATURE: 97 F | RESPIRATION RATE: 18 BRPM | DIASTOLIC BLOOD PRESSURE: 61 MMHG | OXYGEN SATURATION: 99 % | HEART RATE: 61 BPM | SYSTOLIC BLOOD PRESSURE: 166 MMHG

## 2024-10-28 PROCEDURE — 99239 HOSP IP/OBS DSCHRG MGMT >30: CPT

## 2024-10-28 PROCEDURE — 99497 ADVNCD CARE PLAN 30 MIN: CPT

## 2024-10-28 PROCEDURE — ZZZZZ: CPT

## 2024-10-28 PROCEDURE — 99232 SBSQ HOSP IP/OBS MODERATE 35: CPT

## 2024-10-28 RX ORDER — LOSARTAN POTASSIUM 100 MG/1
1 TABLET, FILM COATED ORAL
Refills: 0 | DISCHARGE

## 2024-10-28 RX ORDER — MORPHINE SULFATE 30 MG/1
2 TABLET, EXTENDED RELEASE ORAL EVERY 4 HOURS
Refills: 0 | Status: DISCONTINUED | OUTPATIENT
Start: 2024-10-28 | End: 2024-10-29

## 2024-10-28 RX ORDER — LORAZEPAM 2 MG
1 TABLET ORAL
Refills: 0 | Status: DISCONTINUED | OUTPATIENT
Start: 2024-10-28 | End: 2024-10-29

## 2024-10-28 RX ADMIN — MORPHINE SULFATE 2 MILLIGRAM(S): 30 TABLET, EXTENDED RELEASE ORAL at 12:55

## 2024-10-28 RX ADMIN — Medication 75 MILLILITER(S): at 12:38

## 2024-10-28 RX ADMIN — MORPHINE SULFATE 2 MILLIGRAM(S): 30 TABLET, EXTENDED RELEASE ORAL at 12:36

## 2024-10-28 RX ADMIN — Medication 1 DROP(S): at 05:39

## 2024-10-28 RX ADMIN — Medication 75 MILLILITER(S): at 19:00

## 2024-10-28 RX ADMIN — Medication 1 MILLIGRAM(S): at 13:51

## 2024-10-28 RX ADMIN — PANTOPRAZOLE SODIUM 40 MILLIGRAM(S): 40 TABLET, DELAYED RELEASE ORAL at 05:39

## 2024-10-28 NOTE — PROGRESS NOTE ADULT - SUBJECTIVE AND OBJECTIVE BOX
Progress:  pt mostly lethargic today , non verbal, not interactive       Review of Systems: [ Unable to obtain due to poor mentation]    MEDICATIONS  (STANDING):  artificial  tears Solution 1 Drop(s) Both EYES three times a day  dextrose 5% + sodium chloride 0.9%. 1000 milliLiter(s) (75 mL/Hr) IV Continuous <Continuous>  ferrous    sulfate 325 milliGRAM(s) Oral daily  pantoprazole  Injectable 40 milliGRAM(s) IV Push every 12 hours  sertraline 100 milliGRAM(s) Oral daily  tamsulosin 0.4 milliGRAM(s) Oral at bedtime    MEDICATIONS  (PRN):  acetaminophen     Tablet .. 650 milliGRAM(s) Oral every 6 hours PRN Temp greater or equal to 38C (100.4F), Mild Pain (1 - 3)  aluminum hydroxide/magnesium hydroxide/simethicone Suspension 30 milliLiter(s) Oral every 4 hours PRN Dyspepsia  melatonin 3 milliGRAM(s) Oral at bedtime PRN Insomnia  ondansetron Injectable 4 milliGRAM(s) IV Push every 8 hours PRN Nausea and/or Vomiting      PHYSICAL EXAM:  Vital Signs Last 24 Hrs  T(C): 36.3 (28 Oct 2024 05:03), Max: 36.4 (27 Oct 2024 12:07)  T(F): 97.3 (28 Oct 2024 05:03), Max: 97.5 (27 Oct 2024 12:07)  HR: 64 (28 Oct 2024 05:03) (62 - 65)  BP: 151/63 (28 Oct 2024 05:03) (147/53 - 157/59)  BP(mean): --  RR: 18 (28 Oct 2024 05:03) (18 - 18)  SpO2: 93% (28 Oct 2024 05:03) (93% - 96%)    Parameters below as of 27 Oct 2024 18:03  Patient On (Oxygen Delivery Method): room air      General: lethargic, minimally responsive to stimuli , not in distress      HEENT: n/c, a/t lips dry     Lungs: clear, dim to bases    CV: normal  rate  GI: abd soft     : normal    Musculoskeletal: weakness x4    Skin: pale, w/d     Neuro:  deficits , lethargic, not interactive   Oral intake ability:  oral feeding - minimal to unable   Diet: NPO, - pleasure feeds     LABS:                RADIOLOGY & ADDITIONAL STUDIES:    ADVANCE DIRECTIVES:  dnr dni no feeding tubes   Advanced Care Planning discussion total time spent:

## 2024-10-28 NOTE — DISCHARGE NOTE NURSING/CASE MANAGEMENT/SOCIAL WORK - FINANCIAL ASSISTANCE
[facility] provides services at a reduced cost to those who are determined to be eligible through [facility]’s financial assistance program. Information regarding [facility]’s financial assistance program can be found by going to [email or web address] or by calling [phone].

## 2024-10-28 NOTE — DISCHARGE NOTE NURSING/CASE MANAGEMENT/SOCIAL WORK - CAREGIVER PHONE NUMBER
[phone] [Current] : Current [20 or more] : 20 or more [No] : In the past 12 months have you used drugs other than those required for medical reasons? No [No falls in past year] : Patient reported no falls in the past year [0] : 2) Feeling down, depressed, or hopeless: Not at all (0) [PHQ-2 Negative - No further assessment needed] : PHQ-2 Negative - No further assessment needed [[id number]] : 0

## 2024-10-28 NOTE — PROGRESS NOTE ADULT - ASSESSMENT
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 Recent hospitalization two weeks ago for low hemoglobin requiring 2unit  RBC transfusion. Pt  experienced dark black stool upon arrival to ED.               assessment/plans:     hemoptysis, dark stool in ED, GI bleed    stercoral proctitis  - ED course- occult positive , hb6.2  received  2 u prbs in ER   - CT A/P - No CT evidence of active GI bleed -  limited by motion artifact    -   + stercoral proctitis        On IV zosyn    - GI consulted    -     per GI  EGD on hold as H/H stable today, pt high risk, and may not benefit pt     - s/s eval - pt deemed not safe to eat by mouth   - iv fluids  - hold Plavix  - Cardio consulted- for clearance for endoscopy       - see consult          Metabolic encephalopathy, lethargy   - head ct no acute findings       Hypo-Albuminemia; Severe protein-calorie malnutrition   Dysphagia  - s/s eval - mod diet /following   - Now on pleasure feeds/comfort care   - ftt- hospice consult placed       Depression   - On Sertraline ( home med)           Palliative ;   as a f/u today , chart reviewed, pt d/w Dr Neely this AM, and family meeting  occurred .  Pt remains mostly lethargic, not interactive this AM. Had episodes agitation and discomfort over w/e per report form med team.  See GOC note above. Family updated on pt and now agreeable to comfort/hospice care . Hospice consult placed, pt inpatient  appropriate ,med team Dr Neely  to do Doc to Doc today .   Dyspnea/pain - MS ivp q 3 PRN    Agitation- ativan ivp q 4 PRN

## 2024-10-28 NOTE — DISCHARGE NOTE PROVIDER - HOSPITAL COURSE
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   Recent hospitalization two weeks ago for low hemoglobin requiring 2unit  RBC transfusion. Pt. experienced dark black stool upon arrival to ED - sent to occult stool. Pt. is afebrile, VSS, alert and oriented. Pt. denies recent falls.  pt unable to provide history. spoke to son, who reports that pt was living at home prior to last hospitalization for covid and GI bleed, ambulates with walker, is able to communicate with full sentences  last admission it was noted that he had slurred speech which resolved after PRB'S, pt noted with slurred speech again today. head ct ordered  was seen by GI lasr admission and decided against endoscopy.      # hemoptysis, dark stool in ED, GI bleed  #  stercoral proctitis.   # goals of care   #Metabolic encephalopathy, lethargy, garbled speech - likely du to GI BLEED  #Hypo-Albuminemia; Severe protein-calorie malnutrition   #Dysphagia  #Hyper-Bili   #HTN  #TAVR  #BPH  #Depression     pt  GI bleed stopped and hgb stablizied and GI decided against any intervention and recommended conservative treatment. pt continued to be lethargic and failed speech and swallow twice.   Family agreed to persue comfort measure. PT is accepted inpatient hospice.  pt will be [facility] to hospice inn

## 2024-10-28 NOTE — PROGRESS NOTE ADULT - TREATMENT GUIDELINES
DNR/Comfort measures only/No blood draws/No artificial nutrition/Antibiotic trial/IV fluid trial/DNI

## 2024-10-28 NOTE — PROGRESS NOTE ADULT - CONVERSATION DETAILS
met with pts son Ilya, and daughter Laura today , w/ med team Dr Neely and nurse manager Darek . Pt family was updated by med team today , pt continues to be mainly lethargic , but had periods agitation over weekend ,  discussed pt unable to take in enough nutrition by mouth to sustain . Again discussed, confirmed  w/ family that they do not want aggressive measures used, no cpr, no intubation, they do not want alt means of nutrition, feeding tubes used. They were hoping pt would re bound like  he has in past, but today say they see he is not re bounding  . Discussed possible next steps with them, discussed and explained comfort care and hospice services. Discussed at this time, pt likely inpatient hospice appropriate , as he is agitated at times, and can benefit from anti anxiety meds as well as low dose MS. Family in agreement w/ comfort care options and is agreeable to inpatient hospice evaluation.  Discussed med team will be calling hospice doctor to present pt for inpatient services.

## 2024-10-28 NOTE — DISCHARGE NOTE PROVIDER - DETAILS OF MALNUTRITION DIAGNOSIS/DIAGNOSES
This patient has been assessed with a concern for Malnutrition and was treated during this hospitalization for the following Nutrition diagnosis/diagnoses:     -  10/24/2024: Severe protein-calorie malnutrition

## 2024-10-28 NOTE — PROGRESS NOTE ADULT - ASSESSMENT
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   Recent hospitalization two weeks ago for low hemoglobin requiring 2unit  RBC transfusion. Pt. experienced dark black stool upon arrival to ED - sent to occult stool. Pt. is afebrile, VSS, alert and oriented. Pt. denies recent falls.  pt unable to provide history. spoke to son, who reports that pt was living at home prior to last hospitalization for covid and GI bleed, ambulates with walker, is able to communicate with full sentences  last admission it was noted that he had slurred speech which resolved after PRB'S, pt noted with slurred speech again today. head ct ordered  was seen by GI lasr admission and decided against endoscopy.      # hemoptysis, dark stool in ED, GI bleed  #  stercoral proctitis.   # goals of care   #Metabolic encephalopathy, lethargy, garbled speech - likely du to GI BLEED  #Hypo-Albuminemia; Severe protein-calorie malnutrition   #Dysphagia  #Hyper-Bili   #HTN  #TAVR  #BPH  #Depression       Family agreed to persue comfort measure. PT is accepted inapatient hospice pending bed avaliablity

## 2024-10-28 NOTE — DISCHARGE NOTE NURSING/CASE MANAGEMENT/SOCIAL WORK - NSDCPEFALRISK_GEN_ALL_CORE
For information on Fall & Injury Prevention, visit: [email or web address] OR  [email or web address] OR  https://www.cdc.gov/steadi/patient.html

## 2024-10-28 NOTE — PROGRESS NOTE ADULT - SUBJECTIVE AND OBJECTIVE BOX
PROGRESS NOTE:     Patient is a 89y old  Male who presents with a chief complaint of gi bleed AMS (28 Oct 2024 11:06)      SUBJECTIVE / OVERNIGHT EVENTS:  pt is very lethargic today and family was contacted overnight.   they requested for morphine to be started. This am we had a meeting with both son Ilya and his daughter HCP  family agreed for comfort measures given pt inability to tolerate po, lethergy   [facility] done this am- pt accepted as per MD at hospice inn a bed should be avaliable today  medications adjusted currently on morphine and on ativan PRN for pain/agitations  family agreeble with comfort feeds      ADDITIONAL REVIEW OF SYSTEMS:as per HPI    MEDICATIONS  (STANDING):  artificial  tears Solution 1 Drop(s) Both EYES three times a day  dextrose 5% + sodium chloride 0.9%. 1000 milliLiter(s) (75 mL/Hr) IV Continuous <Continuous>    MEDICATIONS  (PRN):  LORazepam   Injectable 1 milliGRAM(s) IV Push every 2 hours PRN Agitation  morphine  - Injectable 2 milliGRAM(s) IV Push every 4 hours PRN Severe Pain (7 - 10)  ondansetron Injectable 4 milliGRAM(s) IV Push every 8 hours PRN Nausea and/or Vomiting      CAPILLARY BLOOD GLUCOSE        I&O's Summary    27 Oct 2024 07:01  -  28 Oct 2024 07:00  --------------------------------------------------------  IN: 975 mL / OUT: 0 mL / NET: 975 mL        PHYSICAL EXAM:  Vital Signs Last 24 Hrs  T(C): 36.3 (28 Oct 2024 05:03), Max: 36.3 (27 Oct 2024 18:03)  T(F): 97.3 (28 Oct 2024 05:03), Max: 97.4 (27 Oct 2024 18:03)  HR: 64 (28 Oct 2024 05:03) (62 - 64)  BP: 151/63 (28 Oct 2024 05:03) (151/63 - 157/59)  BP(mean): --  RR: 18 (28 Oct 2024 05:03) (18 - 18)  SpO2: 93% (28 Oct 2024 05:03) (93% - 95%)    Parameters below as of 27 Oct 2024 18:03  Patient On (Oxygen Delivery Method): room air        CONSTITUTIONAL:  sleeping comfortably   RESPIRATORY:  lungs are clear to auscultation bilaterally  CARDIOVASCULAR: Regular rate and rhythm,   ABDOMEN: Nontender to palpation, normoactive bowel sounds, no rebound/guarding; No hepatosplenomegaly  MUSCLOSKELETAL: no clubbing or cyanosis of digits; no joint swelling or tenderness to palpation  PSYCH: A+O  x0  LABS:      n/a              discussed during IDR rounds

## 2024-10-28 NOTE — DISCHARGE NOTE NURSING/CASE MANAGEMENT/SOCIAL WORK - PATIENT PORTAL LINK FT
You can access the FollowMyHealth Patient Portal offered by [facility] by registering at the following website: [email or web address]. By joining [facility]’s FollowMyHealth portal, you will also be able to view your health information using other applications (apps) compatible with our system.

## 2024-10-29 PROCEDURE — P9016: CPT

## 2024-10-29 PROCEDURE — 74174 CTA ABD&PLVS W/CONTRAST: CPT | Mod: MC

## 2024-10-29 PROCEDURE — 85730 THROMBOPLASTIN TIME PARTIAL: CPT

## 2024-10-29 PROCEDURE — 80053 COMPREHEN METABOLIC PANEL: CPT

## 2024-10-29 PROCEDURE — 83690 ASSAY OF LIPASE: CPT

## 2024-10-29 PROCEDURE — 81001 URINALYSIS AUTO W/SCOPE: CPT

## 2024-10-29 PROCEDURE — 85025 COMPLETE CBC W/AUTO DIFF WBC: CPT

## 2024-10-29 PROCEDURE — 99232 SBSQ HOSP IP/OBS MODERATE 35: CPT

## 2024-10-29 PROCEDURE — 99285 EMERGENCY DEPT VISIT HI MDM: CPT

## 2024-10-29 PROCEDURE — 36415 COLL VENOUS BLD VENIPUNCTURE: CPT

## 2024-10-29 PROCEDURE — 86901 BLOOD TYPING SEROLOGIC RH(D): CPT

## 2024-10-29 PROCEDURE — 92610 EVALUATE SWALLOWING FUNCTION: CPT

## 2024-10-29 PROCEDURE — 70450 CT HEAD/BRAIN W/O DYE: CPT | Mod: MC

## 2024-10-29 PROCEDURE — 86900 BLOOD TYPING SEROLOGIC ABO: CPT

## 2024-10-29 PROCEDURE — 86923 COMPATIBILITY TEST ELECTRIC: CPT

## 2024-10-29 PROCEDURE — 82272 OCCULT BLD FECES 1-3 TESTS: CPT

## 2024-10-29 PROCEDURE — 86850 RBC ANTIBODY SCREEN: CPT

## 2024-10-29 PROCEDURE — 85610 PROTHROMBIN TIME: CPT

## 2024-10-29 PROCEDURE — 36430 TRANSFUSION BLD/BLD COMPNT: CPT

## 2024-10-29 PROCEDURE — 93005 ELECTROCARDIOGRAM TRACING: CPT

## 2024-10-29 PROCEDURE — 71045 X-RAY EXAM CHEST 1 VIEW: CPT

## 2024-10-29 PROCEDURE — 85027 COMPLETE CBC AUTOMATED: CPT

## 2024-10-29 PROCEDURE — 92523 SPEECH SOUND LANG COMPREHEN: CPT

## 2024-10-29 PROCEDURE — 83605 ASSAY OF LACTIC ACID: CPT

## 2024-10-29 RX ADMIN — MORPHINE SULFATE 2 MILLIGRAM(S): 30 TABLET, EXTENDED RELEASE ORAL at 10:14

## 2024-10-29 RX ADMIN — Medication 1 DROP(S): at 06:05

## 2024-10-29 NOTE — PROGRESS NOTE ADULT - REASON FOR ADMISSION
gi bleed AMS

## 2024-10-29 NOTE — PROGRESS NOTE ADULT - PROVIDER SPECIALTY LIST ADULT
Hospitalist
Hospitalist
Palliative Care
Hospitalist
Hospitalist
Gastroenterology
Hospitalist
Gastroenterology
Hospitalist
Palliative Care

## 2024-10-29 NOTE — PROGRESS NOTE ADULT - TIME BILLING
Time spent includes direct patient care  (interview and examination of patient), discussion with other providers, support staff and/or patient's family members, review of medical records, ordering diagnostic tests and analyzing results, and documentation.
Time spent includes direct patient care  (interview and examination of patient), discussion with other providers, support staff and/or patient's family members, review of medical records, ordering diagnostic tests and analyzing results, and documentation, excluding time spent in ACP discussions.
Time spent includes direct patient care  (interview and examination of patient), discussion with other providers, support staff and/or patient's family members, review of medical records, ordering diagnostic tests and analyzing results, and documentation.
Time spent includes direct patient care  (interview and examination of patient), discussion with other providers, support staff and/or patient's family members, review of medical records, ordering diagnostic tests and analyzing results, and documentation.
Time spent includes direct patient care  (interview and examination of patient), discussion with other providers, support staff and/or patient's family members, review of medical records, ordering diagnostic tests and analyzing results, and documentation, excluding time spent in ACP discussions.

## 2024-10-29 NOTE — PROGRESS NOTE ADULT - NUTRITIONAL ASSESSMENT
This patient has been assessed with a concern for Malnutrition and has been determined to have a diagnosis/diagnoses of Severe protein-calorie malnutrition.    This patient is being managed with:   Diet NPO-  Entered: Oct 25 2024 10:02AM  

## 2024-10-29 NOTE — PROGRESS NOTE ADULT - SUBJECTIVE AND OBJECTIVE BOX
PROGRESS NOTE:     Patient is a 89y old  Male who presents with a chief complaint of gi bleed AMS (28 Oct 2024 15:00)      SUBJECTIVE / OVERNIGHT EVENTS: pt was seen and evaluated  he is sleeping comfortably.  pt was supposed to go to inpatient hospice; however transportation was an issue so remained until today for transport  tracy garcia at bedside. Answered all of his questions and provided emotional support     ADDITIONAL REVIEW OF SYSTEMS:    MEDICATIONS  (STANDING):  artificial  tears Solution 1 Drop(s) Both EYES three times a day  dextrose 5% + sodium chloride 0.9%. 1000 milliLiter(s) (75 mL/Hr) IV Continuous <Continuous>    MEDICATIONS  (PRN):  LORazepam   Injectable 1 milliGRAM(s) IV Push every 2 hours PRN Agitation  morphine  - Injectable 2 milliGRAM(s) IV Push every 4 hours PRN Severe Pain (7 - 10)  ondansetron Injectable 4 milliGRAM(s) IV Push every 8 hours PRN Nausea and/or Vomiting      CAPILLARY BLOOD GLUCOSE        I&O's Summary    28 Oct 2024 07:01  -  29 Oct 2024 07:00  --------------------------------------------------------  IN: 975 mL / OUT: 0 mL / NET: 975 mL        PHYSICAL EXAM:  Vital Signs Last 24 Hrs  T(C): 36.2 (28 Oct 2024 13:23), Max: 36.2 (28 Oct 2024 13:23)  T(F): 97.1 (28 Oct 2024 13:23), Max: 97.1 (28 Oct 2024 13:23)  HR: 61 (28 Oct 2024 13:23) (61 - 61)  BP: 166/61 (28 Oct 2024 13:23) (166/61 - 166/61)  BP(mean): --  RR: 18 (28 Oct 2024 13:23) (18 - 18)  SpO2: 99% (28 Oct 2024 13:23) (99% - 99%)    Parameters below as of 28 Oct 2024 13:23  Patient On (Oxygen Delivery Method): room air    CONSTITUTIONAL:  sleeping comfortably   RESPIRATORY:  lungs are clear to auscultation bilaterally  CARDIOVASCULAR: Regular rate and rhythm,   ABDOMEN: Nontender to palpation, normoactive bowel sounds  MUSCLOSKELETAL: no clubbing or cyanosis of digits; no joint swelling or tenderness to palpation  PSYCH: A+O  x0, lethergic sleepy        LABS:  n/a    discussed during IDR rounds

## 2024-10-29 NOTE — PROGRESS NOTE ADULT - ASSESSMENT
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   Recent hospitalization two weeks ago for low hemoglobin requiring 2unit  RBC transfusion. Pt. experienced dark black stool upon arrival to ED - sent to occult stool. Pt. is afebrile, VSS, alert and oriented. Pt. denies recent falls.  pt unable to provide history. spoke to son, who reports that pt was living at home prior to last hospitalization for covid and GI bleed, ambulates with walker, is able to communicate with full sentences  last admission it was noted that he had slurred speech which resolved after PRB'S, pt noted with slurred speech again today. head ct ordered  was seen by GI lasr admission and decided against endoscopy.      # hemoptysis, dark stool in ED, GI bleed  #  stercoral proctitis.   # goals of care   #Metabolic encephalopathy, lethargy, garbled speech - likely du to GI BLEED  #Hypo-Albuminemia; Severe protein-calorie malnutrition   #Dysphagia  #Hyper-Bili   #HTN  #TAVR  #BPH  #Depression       Family agreed to persue comfort measure. PT is accepted inapatient hospice pending transport

## 2024-11-26 PROCEDURE — 85730 THROMBOPLASTIN TIME PARTIAL: CPT

## 2024-11-26 PROCEDURE — 82607 VITAMIN B-12: CPT

## 2024-11-26 PROCEDURE — 80053 COMPREHEN METABOLIC PANEL: CPT

## 2024-11-26 PROCEDURE — 92526 ORAL FUNCTION THERAPY: CPT

## 2024-11-26 PROCEDURE — 82272 OCCULT BLD FECES 1-3 TESTS: CPT

## 2024-11-26 PROCEDURE — 86850 RBC ANTIBODY SCREEN: CPT

## 2024-11-26 PROCEDURE — 82746 ASSAY OF FOLIC ACID SERUM: CPT

## 2024-11-26 PROCEDURE — 92610 EVALUATE SWALLOWING FUNCTION: CPT

## 2024-11-26 PROCEDURE — 80048 BASIC METABOLIC PNL TOTAL CA: CPT

## 2024-11-26 PROCEDURE — 83735 ASSAY OF MAGNESIUM: CPT

## 2024-11-26 PROCEDURE — 36415 COLL VENOUS BLD VENIPUNCTURE: CPT

## 2024-11-26 PROCEDURE — 83615 LACTATE (LD) (LDH) ENZYME: CPT

## 2024-11-26 PROCEDURE — 97161 PT EVAL LOW COMPLEX 20 MIN: CPT

## 2024-11-26 PROCEDURE — 85045 AUTOMATED RETICULOCYTE COUNT: CPT

## 2024-11-26 PROCEDURE — 85610 PROTHROMBIN TIME: CPT

## 2024-11-26 PROCEDURE — 93005 ELECTROCARDIOGRAM TRACING: CPT

## 2024-11-26 PROCEDURE — 83010 ASSAY OF HAPTOGLOBIN QUANT: CPT

## 2024-11-26 PROCEDURE — 86900 BLOOD TYPING SEROLOGIC ABO: CPT

## 2024-11-26 PROCEDURE — 84145 PROCALCITONIN (PCT): CPT

## 2024-11-26 PROCEDURE — 81001 URINALYSIS AUTO W/SCOPE: CPT

## 2024-11-26 PROCEDURE — 36430 TRANSFUSION BLD/BLD COMPNT: CPT

## 2024-11-26 PROCEDURE — 87077 CULTURE AEROBIC IDENTIFY: CPT

## 2024-11-26 PROCEDURE — 85025 COMPLETE CBC W/AUTO DIFF WBC: CPT

## 2024-11-26 PROCEDURE — 87186 SC STD MICRODIL/AGAR DIL: CPT

## 2024-11-26 PROCEDURE — 86901 BLOOD TYPING SEROLOGIC RH(D): CPT

## 2024-11-26 PROCEDURE — 85027 COMPLETE CBC AUTOMATED: CPT

## 2024-11-26 PROCEDURE — 99291 CRITICAL CARE FIRST HOUR: CPT | Mod: 25

## 2024-11-26 PROCEDURE — 86923 COMPATIBILITY TEST ELECTRIC: CPT

## 2024-11-26 PROCEDURE — 87086 URINE CULTURE/COLONY COUNT: CPT

## 2024-11-26 PROCEDURE — P9016: CPT
